# Patient Record
Sex: MALE | Race: WHITE | NOT HISPANIC OR LATINO
[De-identification: names, ages, dates, MRNs, and addresses within clinical notes are randomized per-mention and may not be internally consistent; named-entity substitution may affect disease eponyms.]

---

## 2017-01-04 ENCOUNTER — APPOINTMENT (OUTPATIENT)
Dept: CARDIOLOGY | Facility: CLINIC | Age: 82
End: 2017-01-04

## 2017-01-04 ENCOUNTER — NON-APPOINTMENT (OUTPATIENT)
Age: 82
End: 2017-01-04

## 2017-01-04 VITALS
OXYGEN SATURATION: 96 % | BODY MASS INDEX: 26.93 KG/M2 | WEIGHT: 152 LBS | SYSTOLIC BLOOD PRESSURE: 130 MMHG | HEIGHT: 63 IN | DIASTOLIC BLOOD PRESSURE: 80 MMHG | HEART RATE: 76 BPM

## 2017-01-05 ENCOUNTER — APPOINTMENT (OUTPATIENT)
Dept: HEMATOLOGY ONCOLOGY | Facility: CLINIC | Age: 82
End: 2017-01-05

## 2017-01-05 ENCOUNTER — LABORATORY RESULT (OUTPATIENT)
Age: 82
End: 2017-01-05

## 2017-01-05 VITALS
DIASTOLIC BLOOD PRESSURE: 75 MMHG | SYSTOLIC BLOOD PRESSURE: 165 MMHG | HEIGHT: 63 IN | TEMPERATURE: 98.2 F | WEIGHT: 158 LBS | HEART RATE: 87 BPM | BODY MASS INDEX: 28 KG/M2

## 2017-01-05 LAB
HCT VFR BLD CALC: 42.7 %
HGB BLD-MCNC: 12.8 G/DL
MCHC RBC-ENTMCNC: 30 GM/DL
MCHC RBC-ENTMCNC: 33.4 PG
MCV RBC AUTO: 111 FL
PLATELET # BLD AUTO: 486 K/UL
RBC # BLD: 3.84 M/UL
RBC # FLD: 18.8 %
WBC # FLD AUTO: 8.5 K/UL

## 2017-01-13 LAB
ALBUMIN SERPL ELPH-MCNC: 3.9 G/DL
ALP BLD-CCNC: 66 U/L
ALT SERPL-CCNC: 9 U/L
ANION GAP SERPL CALC-SCNC: 15 MMOL/L
AST SERPL-CCNC: 14 U/L
BILIRUB SERPL-MCNC: <0.2 MG/DL
BUN SERPL-MCNC: 49 MG/DL
CALCIUM SERPL-MCNC: 9 MG/DL
CHLORIDE SERPL-SCNC: 105 MMOL/L
CO2 SERPL-SCNC: 21 MMOL/L
CREAT SERPL-MCNC: 1.47 MG/DL
GLUCOSE SERPL-MCNC: 92 MG/DL
POTASSIUM SERPL-SCNC: 4.9 MMOL/L
PROT SERPL-MCNC: 6.5 G/DL
SODIUM SERPL-SCNC: 141 MMOL/L

## 2017-02-06 ENCOUNTER — LABORATORY RESULT (OUTPATIENT)
Age: 82
End: 2017-02-06

## 2017-02-06 ENCOUNTER — APPOINTMENT (OUTPATIENT)
Dept: HEMATOLOGY ONCOLOGY | Facility: CLINIC | Age: 82
End: 2017-02-06

## 2017-02-06 VITALS
HEART RATE: 86 BPM | DIASTOLIC BLOOD PRESSURE: 76 MMHG | SYSTOLIC BLOOD PRESSURE: 164 MMHG | TEMPERATURE: 97.6 F | WEIGHT: 158 LBS | BODY MASS INDEX: 28 KG/M2 | HEIGHT: 63 IN

## 2017-02-06 LAB
HCT VFR BLD CALC: 42.5 %
HGB BLD-MCNC: 13.8 G/DL
MCHC RBC-ENTMCNC: 32.4 GM/DL
MCHC RBC-ENTMCNC: 32.6 PG
MCV RBC AUTO: 101 FL
PLATELET # BLD AUTO: 635 K/UL
RBC # BLD: 4.22 M/UL
RBC # FLD: 18.2 %
WBC # FLD AUTO: 12.2 K/UL

## 2017-02-15 ENCOUNTER — APPOINTMENT (OUTPATIENT)
Dept: CARDIOLOGY | Facility: CLINIC | Age: 82
End: 2017-02-15

## 2017-02-15 ENCOUNTER — NON-APPOINTMENT (OUTPATIENT)
Age: 82
End: 2017-02-15

## 2017-02-15 VITALS
HEART RATE: 72 BPM | WEIGHT: 154 LBS | BODY MASS INDEX: 27.29 KG/M2 | DIASTOLIC BLOOD PRESSURE: 60 MMHG | SYSTOLIC BLOOD PRESSURE: 100 MMHG | OXYGEN SATURATION: 96 % | HEIGHT: 63 IN

## 2017-02-16 LAB
ALBUMIN SERPL ELPH-MCNC: 4.1 G/DL
ALP BLD-CCNC: 70 U/L
ALT SERPL-CCNC: 7 U/L
ANION GAP SERPL CALC-SCNC: 15 MMOL/L
AST SERPL-CCNC: 15 U/L
BASOPHILS # BLD AUTO: 0.06 K/UL
BASOPHILS NFR BLD AUTO: 0.7 %
BILIRUB SERPL-MCNC: 0.3 MG/DL
BUN SERPL-MCNC: 31 MG/DL
CALCIUM SERPL-MCNC: 9.6 MG/DL
CHLORIDE SERPL-SCNC: 106 MMOL/L
CO2 SERPL-SCNC: 21 MMOL/L
CREAT SERPL-MCNC: 1.54 MG/DL
EOSINOPHIL # BLD AUTO: 0.15 K/UL
EOSINOPHIL NFR BLD AUTO: 1.9 %
ERYTHROCYTE [SEDIMENTATION RATE] IN BLOOD BY WESTERGREN METHOD: 17 MM/HR
HCT VFR BLD CALC: 45.3 %
HGB BLD-MCNC: 14 G/DL
IMM GRANULOCYTES NFR BLD AUTO: 0.2 %
LYMPHOCYTES # BLD AUTO: 0.62 K/UL
LYMPHOCYTES NFR BLD AUTO: 7.7 %
MAN DIFF?: NORMAL
MCHC RBC-ENTMCNC: 30.9 GM/DL
MCHC RBC-ENTMCNC: 31.7 PG
MCV RBC AUTO: 102.7 FL
MONOCYTES # BLD AUTO: 0.15 K/UL
MONOCYTES NFR BLD AUTO: 1.9 %
NEUTROPHILS # BLD AUTO: 7.1 K/UL
NEUTROPHILS NFR BLD AUTO: 87.6 %
PLATELET # BLD AUTO: 559 K/UL
POTASSIUM SERPL-SCNC: 5 MMOL/L
PROT SERPL-MCNC: 6.7 G/DL
RBC # BLD: 4.41 M/UL
RBC # FLD: 19 %
SODIUM SERPL-SCNC: 142 MMOL/L
WBC # FLD AUTO: 8.1 K/UL

## 2017-02-17 ENCOUNTER — APPOINTMENT (OUTPATIENT)
Dept: HEMATOLOGY ONCOLOGY | Facility: CLINIC | Age: 82
End: 2017-02-17

## 2017-03-20 ENCOUNTER — APPOINTMENT (OUTPATIENT)
Dept: HEMATOLOGY ONCOLOGY | Facility: CLINIC | Age: 82
End: 2017-03-20

## 2017-04-07 ENCOUNTER — LABORATORY RESULT (OUTPATIENT)
Age: 82
End: 2017-04-07

## 2017-04-07 ENCOUNTER — APPOINTMENT (OUTPATIENT)
Dept: HEMATOLOGY ONCOLOGY | Facility: CLINIC | Age: 82
End: 2017-04-07

## 2017-04-07 VITALS
DIASTOLIC BLOOD PRESSURE: 85 MMHG | WEIGHT: 161 LBS | HEART RATE: 80 BPM | BODY MASS INDEX: 28.53 KG/M2 | TEMPERATURE: 97.5 F | HEIGHT: 63 IN | SYSTOLIC BLOOD PRESSURE: 153 MMHG

## 2017-04-07 LAB
HCT VFR BLD CALC: 41.5 %
HGB BLD-MCNC: 12.8 G/DL
MCHC RBC-ENTMCNC: 29.2 PG
MCHC RBC-ENTMCNC: 30.8 GM/DL
MCV RBC AUTO: 94.7 FL
PLATELET # BLD AUTO: 573 K/UL
RBC # BLD: 4.38 M/UL
RBC # FLD: 19.7 %
WBC # FLD AUTO: 9.9 K/UL

## 2017-05-05 ENCOUNTER — APPOINTMENT (OUTPATIENT)
Dept: HEMATOLOGY ONCOLOGY | Facility: CLINIC | Age: 82
End: 2017-05-05

## 2017-05-10 ENCOUNTER — APPOINTMENT (OUTPATIENT)
Dept: CARDIOLOGY | Facility: CLINIC | Age: 82
End: 2017-05-10

## 2017-07-17 ENCOUNTER — APPOINTMENT (OUTPATIENT)
Dept: HEMATOLOGY ONCOLOGY | Facility: CLINIC | Age: 82
End: 2017-07-17

## 2017-07-17 ENCOUNTER — LABORATORY RESULT (OUTPATIENT)
Age: 82
End: 2017-07-17

## 2017-07-17 VITALS
DIASTOLIC BLOOD PRESSURE: 65 MMHG | WEIGHT: 149 LBS | BODY MASS INDEX: 26.4 KG/M2 | HEIGHT: 63 IN | TEMPERATURE: 98.2 F | HEART RATE: 62 BPM | SYSTOLIC BLOOD PRESSURE: 123 MMHG

## 2017-07-17 LAB
HCT VFR BLD CALC: 37.7 %
HGB BLD-MCNC: 12.1 G/DL
MCHC RBC-ENTMCNC: 24.7 PG
MCHC RBC-ENTMCNC: 32 GM/DL
MCV RBC AUTO: 77.3 FL
PLATELET # BLD AUTO: 913 K/UL
RBC # BLD: 4.87 M/UL
RBC # FLD: 20.5 %
WBC # FLD AUTO: 11.5 K/UL

## 2017-07-26 ENCOUNTER — APPOINTMENT (OUTPATIENT)
Dept: HEMATOLOGY ONCOLOGY | Facility: CLINIC | Age: 82
End: 2017-07-26

## 2017-07-26 ENCOUNTER — LABORATORY RESULT (OUTPATIENT)
Age: 82
End: 2017-07-26

## 2017-07-26 ENCOUNTER — NON-APPOINTMENT (OUTPATIENT)
Age: 82
End: 2017-07-26

## 2017-07-26 ENCOUNTER — APPOINTMENT (OUTPATIENT)
Dept: CARDIOLOGY | Facility: CLINIC | Age: 82
End: 2017-07-26

## 2017-07-26 VITALS
SYSTOLIC BLOOD PRESSURE: 130 MMHG | OXYGEN SATURATION: 95 % | DIASTOLIC BLOOD PRESSURE: 74 MMHG | WEIGHT: 144 LBS | HEIGHT: 63 IN | BODY MASS INDEX: 25.52 KG/M2 | HEART RATE: 79 BPM

## 2017-07-26 VITALS
HEART RATE: 91 BPM | BODY MASS INDEX: 28.27 KG/M2 | DIASTOLIC BLOOD PRESSURE: 63 MMHG | SYSTOLIC BLOOD PRESSURE: 104 MMHG | HEIGHT: 60 IN | WEIGHT: 144 LBS | TEMPERATURE: 97.5 F

## 2017-07-26 DIAGNOSIS — G45.9 TRANSIENT CEREBRAL ISCHEMIC ATTACK, UNSPECIFIED: ICD-10-CM

## 2017-07-26 DIAGNOSIS — I10 ESSENTIAL (PRIMARY) HYPERTENSION: ICD-10-CM

## 2017-07-26 DIAGNOSIS — M54.9 DORSALGIA, UNSPECIFIED: ICD-10-CM

## 2017-07-26 DIAGNOSIS — R42 DIZZINESS AND GIDDINESS: ICD-10-CM

## 2017-07-26 LAB
HCT VFR BLD CALC: 41.7 %
HGB BLD-MCNC: 12.8 G/DL
MCHC RBC-ENTMCNC: 23.7 PG
MCHC RBC-ENTMCNC: 30.6 GM/DL
MCV RBC AUTO: 77.3 FL
PLATELET # BLD AUTO: 443 K/UL
RBC # BLD: 5.4 M/UL
RBC # FLD: 21 %
WBC # FLD AUTO: 4.1 K/UL

## 2017-07-27 LAB
25(OH)D3 SERPL-MCNC: 27.3 NG/ML
ALBUMIN SERPL ELPH-MCNC: 4.1 G/DL
ALP BLD-CCNC: 66 U/L
ALT SERPL-CCNC: 10 U/L
ANION GAP SERPL CALC-SCNC: 17 MMOL/L
AST SERPL-CCNC: 16 U/L
BASOPHILS # BLD AUTO: 0.11 K/UL
BASOPHILS NFR BLD AUTO: 2.6 %
BILIRUB SERPL-MCNC: 0.4 MG/DL
BUN SERPL-MCNC: 42 MG/DL
CALCIUM SERPL-MCNC: 9.8 MG/DL
CHLORIDE SERPL-SCNC: 98 MMOL/L
CHOLEST SERPL-MCNC: 238 MG/DL
CHOLEST/HDLC SERPL: 4.5 RATIO
CO2 SERPL-SCNC: 20 MMOL/L
CREAT SERPL-MCNC: 1.86 MG/DL
EOSINOPHIL # BLD AUTO: 0.04 K/UL
EOSINOPHIL NFR BLD AUTO: 0.9 %
GLUCOSE SERPL-MCNC: 95 MG/DL
HBA1C MFR BLD HPLC: 5.3 %
HCT VFR BLD CALC: 40.5 %
HDLC SERPL-MCNC: 53 MG/DL
HGB BLD-MCNC: 12.1 G/DL
LDLC SERPL CALC-MCNC: 168 MG/DL
LYMPHOCYTES # BLD AUTO: 0.75 K/UL
LYMPHOCYTES NFR BLD AUTO: 18.4 %
MAGNESIUM SERPL-MCNC: 2.1 MG/DL
MAN DIFF?: NORMAL
MCHC RBC-ENTMCNC: 23.1 PG
MCHC RBC-ENTMCNC: 29.9 GM/DL
MCV RBC AUTO: 77.4 FL
MONOCYTES # BLD AUTO: 0.04 K/UL
MONOCYTES NFR BLD AUTO: 0.9 %
NEUTROPHILS # BLD AUTO: 3.14 K/UL
NEUTROPHILS NFR BLD AUTO: 77.2 %
PLATELET # BLD AUTO: 530 K/UL
POTASSIUM SERPL-SCNC: 5.1 MMOL/L
PROT SERPL-MCNC: 6.8 G/DL
RBC # BLD: 5.23 M/UL
RBC # FLD: 21.9 %
SODIUM SERPL-SCNC: 135 MMOL/L
TRIGL SERPL-MCNC: 87 MG/DL
TSH SERPL-ACNC: 3.08 UIU/ML
WBC # FLD AUTO: 4.07 K/UL

## 2017-08-02 ENCOUNTER — APPOINTMENT (OUTPATIENT)
Dept: HEMATOLOGY ONCOLOGY | Facility: CLINIC | Age: 82
End: 2017-08-02
Payer: MEDICARE

## 2017-08-02 ENCOUNTER — LABORATORY RESULT (OUTPATIENT)
Age: 82
End: 2017-08-02

## 2017-08-02 VITALS — SYSTOLIC BLOOD PRESSURE: 105 MMHG | TEMPERATURE: 97.7 F | DIASTOLIC BLOOD PRESSURE: 70 MMHG | HEART RATE: 93 BPM

## 2017-08-02 LAB
HCT VFR BLD CALC: 39.9 %
HGB BLD-MCNC: 12.5 G/DL
MCHC RBC-ENTMCNC: 24.5 PG
MCHC RBC-ENTMCNC: 31.3 GM/DL
MCV RBC AUTO: 78.3 FL
PLATELET # BLD AUTO: 292 K/UL
RBC # BLD: 5.1 M/UL
RBC # FLD: 25.2 %
WBC # FLD AUTO: 9.5 K/UL

## 2017-08-02 PROCEDURE — 85025 COMPLETE CBC W/AUTO DIFF WBC: CPT

## 2017-08-02 PROCEDURE — 36415 COLL VENOUS BLD VENIPUNCTURE: CPT

## 2017-08-09 ENCOUNTER — LABORATORY RESULT (OUTPATIENT)
Age: 82
End: 2017-08-09

## 2017-08-09 ENCOUNTER — APPOINTMENT (OUTPATIENT)
Dept: HEMATOLOGY ONCOLOGY | Facility: CLINIC | Age: 82
End: 2017-08-09
Payer: MEDICARE

## 2017-08-09 VITALS
WEIGHT: 144 LBS | SYSTOLIC BLOOD PRESSURE: 136 MMHG | HEIGHT: 60 IN | HEART RATE: 77 BPM | BODY MASS INDEX: 28.27 KG/M2 | TEMPERATURE: 97.6 F | DIASTOLIC BLOOD PRESSURE: 61 MMHG

## 2017-08-09 LAB
HCT VFR BLD CALC: 41 %
HGB BLD-MCNC: 12.6 G/DL
MCHC RBC-ENTMCNC: 24.8 PG
MCHC RBC-ENTMCNC: 30.7 GM/DL
MCV RBC AUTO: 80.8 FL
PLATELET # BLD AUTO: 721 K/UL
RBC # BLD: 5.07 M/UL
RBC # FLD: 28 %
WBC # FLD AUTO: 8.9 K/UL

## 2017-08-09 PROCEDURE — 85025 COMPLETE CBC W/AUTO DIFF WBC: CPT

## 2017-08-09 PROCEDURE — 36415 COLL VENOUS BLD VENIPUNCTURE: CPT

## 2017-08-16 ENCOUNTER — LABORATORY RESULT (OUTPATIENT)
Age: 82
End: 2017-08-16

## 2017-08-16 ENCOUNTER — APPOINTMENT (OUTPATIENT)
Dept: HEMATOLOGY ONCOLOGY | Facility: CLINIC | Age: 82
End: 2017-08-16
Payer: MEDICARE

## 2017-08-16 VITALS
HEART RATE: 76 BPM | WEIGHT: 140 LBS | BODY MASS INDEX: 27.48 KG/M2 | HEIGHT: 60 IN | DIASTOLIC BLOOD PRESSURE: 76 MMHG | SYSTOLIC BLOOD PRESSURE: 135 MMHG | TEMPERATURE: 97.7 F

## 2017-08-16 LAB
HCT VFR BLD CALC: 39.1 %
HGB BLD-MCNC: 12.6 G/DL
MCHC RBC-ENTMCNC: 26.3 PG
MCHC RBC-ENTMCNC: 32.3 GM/DL
MCV RBC AUTO: 81.4 FL
PLATELET # BLD AUTO: 329 K/UL
RBC # BLD: 4.8 M/UL
RBC # FLD: 30.5 %
WBC # FLD AUTO: 4.7 K/UL

## 2017-08-16 PROCEDURE — 36415 COLL VENOUS BLD VENIPUNCTURE: CPT

## 2017-08-16 PROCEDURE — 85025 COMPLETE CBC W/AUTO DIFF WBC: CPT

## 2017-08-18 ENCOUNTER — APPOINTMENT (OUTPATIENT)
Dept: CARDIOLOGY | Facility: CLINIC | Age: 82
End: 2017-08-18
Payer: MEDICARE

## 2017-08-18 PROCEDURE — 93306 TTE W/DOPPLER COMPLETE: CPT

## 2017-08-24 ENCOUNTER — APPOINTMENT (OUTPATIENT)
Dept: CARDIOLOGY | Facility: CLINIC | Age: 82
End: 2017-08-24
Payer: MEDICARE

## 2017-08-24 PROCEDURE — 93880 EXTRACRANIAL BILAT STUDY: CPT

## 2017-08-25 ENCOUNTER — APPOINTMENT (OUTPATIENT)
Dept: HEMATOLOGY ONCOLOGY | Facility: CLINIC | Age: 82
End: 2017-08-25

## 2017-08-30 ENCOUNTER — LABORATORY RESULT (OUTPATIENT)
Age: 82
End: 2017-08-30

## 2017-08-30 ENCOUNTER — APPOINTMENT (OUTPATIENT)
Dept: HEMATOLOGY ONCOLOGY | Facility: CLINIC | Age: 82
End: 2017-08-30
Payer: MEDICARE

## 2017-08-30 VITALS — SYSTOLIC BLOOD PRESSURE: 145 MMHG | DIASTOLIC BLOOD PRESSURE: 85 MMHG | HEART RATE: 78 BPM | TEMPERATURE: 97.9 F

## 2017-08-30 PROCEDURE — 36415 COLL VENOUS BLD VENIPUNCTURE: CPT

## 2017-08-30 PROCEDURE — 85025 COMPLETE CBC W/AUTO DIFF WBC: CPT

## 2017-09-01 LAB
HCT VFR BLD CALC: 38.4 %
HGB BLD-MCNC: 12.2 G/DL
MCHC RBC-ENTMCNC: 27.6 PG
MCHC RBC-ENTMCNC: 31.8 GM/DL
MCV RBC AUTO: 86.7 FL
PLATELET # BLD AUTO: 452 K/UL
RBC # BLD: 4.44 M/UL
RBC # FLD: 32.5 %
WBC # FLD AUTO: 6.7 K/UL

## 2017-09-06 ENCOUNTER — APPOINTMENT (OUTPATIENT)
Dept: CARDIOLOGY | Facility: CLINIC | Age: 82
End: 2017-09-06
Payer: MEDICARE

## 2017-09-06 ENCOUNTER — MEDICATION RENEWAL (OUTPATIENT)
Age: 82
End: 2017-09-06

## 2017-09-06 VITALS
BODY MASS INDEX: 26.9 KG/M2 | DIASTOLIC BLOOD PRESSURE: 77 MMHG | HEIGHT: 60 IN | SYSTOLIC BLOOD PRESSURE: 142 MMHG | OXYGEN SATURATION: 95 % | WEIGHT: 137 LBS | HEART RATE: 96 BPM

## 2017-09-06 PROCEDURE — 90686 IIV4 VACC NO PRSV 0.5 ML IM: CPT

## 2017-09-06 PROCEDURE — G0008: CPT

## 2017-09-06 PROCEDURE — 99214 OFFICE O/P EST MOD 30 MIN: CPT | Mod: 25

## 2017-09-06 PROCEDURE — 93000 ELECTROCARDIOGRAM COMPLETE: CPT

## 2017-09-06 RX ORDER — HYDROXYUREA 500 MG/1
500 CAPSULE ORAL DAILY
Qty: 270 | Refills: 3 | Status: DISCONTINUED | COMMUNITY
Start: 2017-07-17 | End: 2017-09-06

## 2017-09-08 ENCOUNTER — NON-APPOINTMENT (OUTPATIENT)
Age: 82
End: 2017-09-08

## 2017-09-13 ENCOUNTER — APPOINTMENT (OUTPATIENT)
Dept: HEMATOLOGY ONCOLOGY | Facility: CLINIC | Age: 82
End: 2017-09-13

## 2017-11-08 ENCOUNTER — APPOINTMENT (OUTPATIENT)
Dept: NEUROLOGY | Facility: CLINIC | Age: 82
End: 2017-11-08
Payer: MEDICARE

## 2017-11-08 VITALS
SYSTOLIC BLOOD PRESSURE: 102 MMHG | BODY MASS INDEX: 22.33 KG/M2 | WEIGHT: 134 LBS | DIASTOLIC BLOOD PRESSURE: 64 MMHG | HEIGHT: 65 IN | HEART RATE: 86 BPM

## 2017-11-08 DIAGNOSIS — M48.061 SPINAL STENOSIS, LUMBAR REGION WITHOUT NEUROGENIC CLAUDICATION: ICD-10-CM

## 2017-11-08 DIAGNOSIS — R25.1 TREMOR, UNSPECIFIED: ICD-10-CM

## 2017-11-08 DIAGNOSIS — R26.81 UNSTEADINESS ON FEET: ICD-10-CM

## 2017-11-08 DIAGNOSIS — G25.2 OTHER SPECIFIED FORMS OF TREMOR: ICD-10-CM

## 2017-11-08 PROCEDURE — 99215 OFFICE O/P EST HI 40 MIN: CPT

## 2017-11-16 ENCOUNTER — RX RENEWAL (OUTPATIENT)
Age: 82
End: 2017-11-16

## 2017-11-22 ENCOUNTER — APPOINTMENT (OUTPATIENT)
Dept: HEMATOLOGY ONCOLOGY | Facility: CLINIC | Age: 82
End: 2017-11-22

## 2017-12-06 ENCOUNTER — APPOINTMENT (OUTPATIENT)
Dept: CARDIOLOGY | Facility: CLINIC | Age: 82
End: 2017-12-06

## 2017-12-19 ENCOUNTER — INPATIENT (INPATIENT)
Facility: HOSPITAL | Age: 82
LOS: 4 days | Discharge: HOME HEALTH | End: 2017-12-24
Attending: INTERNAL MEDICINE | Admitting: INTERNAL MEDICINE
Payer: MEDICARE

## 2017-12-19 VITALS
HEART RATE: 76 BPM | OXYGEN SATURATION: 97 % | SYSTOLIC BLOOD PRESSURE: 127 MMHG | WEIGHT: 139.99 LBS | RESPIRATION RATE: 16 BRPM | DIASTOLIC BLOOD PRESSURE: 76 MMHG

## 2017-12-19 DIAGNOSIS — Z98.890 OTHER SPECIFIED POSTPROCEDURAL STATES: Chronic | ICD-10-CM

## 2017-12-19 LAB
ABO RH CONFIRMATION: SIGNIFICANT CHANGE UP
ALBUMIN SERPL ELPH-MCNC: 3.2 G/DL — LOW (ref 3.3–5)
ALP SERPL-CCNC: 52 U/L — SIGNIFICANT CHANGE UP (ref 40–120)
ALT FLD-CCNC: 12 U/L — SIGNIFICANT CHANGE UP (ref 12–78)
ANION GAP SERPL CALC-SCNC: 9 MMOL/L — SIGNIFICANT CHANGE UP (ref 5–17)
ANISOCYTOSIS BLD QL: SLIGHT — SIGNIFICANT CHANGE UP
APPEARANCE UR: CLEAR — SIGNIFICANT CHANGE UP
APTT BLD: 32.8 SEC — SIGNIFICANT CHANGE UP (ref 27.5–37.4)
AST SERPL-CCNC: 16 U/L — SIGNIFICANT CHANGE UP (ref 15–37)
BILIRUB SERPL-MCNC: 0.2 MG/DL — SIGNIFICANT CHANGE UP (ref 0.2–1.2)
BILIRUB UR-MCNC: NEGATIVE — SIGNIFICANT CHANGE UP
BLD GP AB SCN SERPL QL: SIGNIFICANT CHANGE UP
BUN SERPL-MCNC: 56 MG/DL — HIGH (ref 7–23)
CALCIUM SERPL-MCNC: 8.6 MG/DL — SIGNIFICANT CHANGE UP (ref 8.5–10.1)
CHLORIDE SERPL-SCNC: 108 MMOL/L — SIGNIFICANT CHANGE UP (ref 96–108)
CO2 SERPL-SCNC: 19 MMOL/L — LOW (ref 22–31)
COLOR SPEC: YELLOW — SIGNIFICANT CHANGE UP
CREAT SERPL-MCNC: 2 MG/DL — HIGH (ref 0.5–1.3)
DIFF PNL FLD: NEGATIVE — SIGNIFICANT CHANGE UP
ELLIPTOCYTES BLD QL SMEAR: SLIGHT — SIGNIFICANT CHANGE UP
EOSINOPHIL NFR BLD AUTO: 1 % — SIGNIFICANT CHANGE UP (ref 0–6)
GIANT PLATELETS BLD QL SMEAR: PRESENT — SIGNIFICANT CHANGE UP
GLUCOSE SERPL-MCNC: 91 MG/DL — SIGNIFICANT CHANGE UP (ref 70–99)
GLUCOSE UR QL: NEGATIVE MG/DL — SIGNIFICANT CHANGE UP
HCT VFR BLD CALC: 21.8 % — LOW (ref 39–50)
HGB BLD-MCNC: 6.9 G/DL — CRITICAL LOW (ref 13–17)
HYPOCHROMIA BLD QL: SIGNIFICANT CHANGE UP
INR BLD: 1 RATIO — SIGNIFICANT CHANGE UP (ref 0.88–1.16)
KETONES UR-MCNC: NEGATIVE — SIGNIFICANT CHANGE UP
LEUKOCYTE ESTERASE UR-ACNC: NEGATIVE — SIGNIFICANT CHANGE UP
LYMPHOCYTES # BLD AUTO: 19 % — SIGNIFICANT CHANGE UP (ref 13–44)
MACROCYTES BLD QL: SLIGHT — SIGNIFICANT CHANGE UP
MANUAL DIF COMMENT BLD-IMP: SIGNIFICANT CHANGE UP
MCHC RBC-ENTMCNC: 30.4 PG — SIGNIFICANT CHANGE UP (ref 27–34)
MCHC RBC-ENTMCNC: 31.5 GM/DL — LOW (ref 32–36)
MCV RBC AUTO: 96.7 FL — SIGNIFICANT CHANGE UP (ref 80–100)
MICROCYTES BLD QL: SLIGHT — SIGNIFICANT CHANGE UP
MONOCYTES NFR BLD AUTO: 2 % — SIGNIFICANT CHANGE UP (ref 2–14)
NEUTROPHILS NFR BLD AUTO: 77 % — SIGNIFICANT CHANGE UP (ref 43–77)
NITRITE UR-MCNC: NEGATIVE — SIGNIFICANT CHANGE UP
OVALOCYTES BLD QL SMEAR: SIGNIFICANT CHANGE UP
PH UR: 5 — SIGNIFICANT CHANGE UP (ref 5–8)
PLAT MORPH BLD: NORMAL — SIGNIFICANT CHANGE UP
PLATELET # BLD AUTO: 214 K/UL — SIGNIFICANT CHANGE UP (ref 150–400)
POIKILOCYTOSIS BLD QL AUTO: SIGNIFICANT CHANGE UP
POTASSIUM SERPL-MCNC: 5.2 MMOL/L — SIGNIFICANT CHANGE UP (ref 3.5–5.3)
POTASSIUM SERPL-SCNC: 5.2 MMOL/L — SIGNIFICANT CHANGE UP (ref 3.5–5.3)
PROT SERPL-MCNC: 6.2 GM/DL — SIGNIFICANT CHANGE UP (ref 6–8.3)
PROT UR-MCNC: 15 MG/DL
PROTHROM AB SERPL-ACNC: 10.8 SEC — SIGNIFICANT CHANGE UP (ref 9.8–12.7)
RBC # BLD: 2.25 M/UL — LOW (ref 4.2–5.8)
RBC # FLD: 21.2 % — HIGH (ref 10.3–14.5)
RBC BLD AUTO: (no result)
RBC CASTS # UR COMP ASSIST: NEGATIVE /HPF — SIGNIFICANT CHANGE UP (ref 0–4)
SCHISTOCYTES BLD QL AUTO: SLIGHT — SIGNIFICANT CHANGE UP
SODIUM SERPL-SCNC: 136 MMOL/L — SIGNIFICANT CHANGE UP (ref 135–145)
SP GR SPEC: 1.02 — SIGNIFICANT CHANGE UP (ref 1.01–1.02)
TYPE + AB SCN PNL BLD: SIGNIFICANT CHANGE UP
UROBILINOGEN FLD QL: NEGATIVE MG/DL — SIGNIFICANT CHANGE UP
VARIANT LYMPHS # BLD: 1 % — SIGNIFICANT CHANGE UP (ref 0–6)
WBC # BLD: 2.5 K/UL — LOW (ref 3.8–10.5)
WBC # FLD AUTO: 2.5 K/UL — LOW (ref 3.8–10.5)
WBC UR QL: SIGNIFICANT CHANGE UP

## 2017-12-19 PROCEDURE — 99285 EMERGENCY DEPT VISIT HI MDM: CPT

## 2017-12-19 PROCEDURE — 71010: CPT | Mod: 26

## 2017-12-19 RX ORDER — PANTOPRAZOLE SODIUM 20 MG/1
8 TABLET, DELAYED RELEASE ORAL
Qty: 80 | Refills: 0 | Status: DISCONTINUED | OUTPATIENT
Start: 2017-12-19 | End: 2017-12-20

## 2017-12-19 RX ORDER — SODIUM CHLORIDE 9 MG/ML
1000 INJECTION INTRAMUSCULAR; INTRAVENOUS; SUBCUTANEOUS
Qty: 0 | Refills: 0 | Status: DISCONTINUED | OUTPATIENT
Start: 2017-12-19 | End: 2017-12-20

## 2017-12-19 RX ORDER — ACETAMINOPHEN 500 MG
650 TABLET ORAL EVERY 6 HOURS
Qty: 0 | Refills: 0 | Status: DISCONTINUED | OUTPATIENT
Start: 2017-12-19 | End: 2017-12-24

## 2017-12-19 RX ORDER — AMLODIPINE BESYLATE 2.5 MG/1
10 TABLET ORAL DAILY
Qty: 0 | Refills: 0 | Status: DISCONTINUED | OUTPATIENT
Start: 2017-12-19 | End: 2017-12-24

## 2017-12-19 RX ORDER — PANTOPRAZOLE SODIUM 20 MG/1
80 TABLET, DELAYED RELEASE ORAL ONCE
Qty: 0 | Refills: 0 | Status: COMPLETED | OUTPATIENT
Start: 2017-12-19 | End: 2017-12-19

## 2017-12-19 RX ADMIN — PANTOPRAZOLE SODIUM 80 MILLIGRAM(S): 20 TABLET, DELAYED RELEASE ORAL at 19:57

## 2017-12-19 RX ADMIN — PANTOPRAZOLE SODIUM 10 MG/HR: 20 TABLET, DELAYED RELEASE ORAL at 20:07
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
## 2017-12-19 NOTE — ED ADULT NURSE REASSESSMENT NOTE - NS ED NURSE REASSESS COMMENT FT1
report received from MAHNAZ Barrera. pt resting comfortably. protonix IVP and IV infusion initiated. type & screen drawn by phlebotomy, awaiting results prior to PRBC infusions. safety maintained. will continue to monitor.

## 2017-12-19 NOTE — H&P ADULT - ASSESSMENT
89 yo M with PMH of polycythemia (on hydroxyurea), HTN, h/o back surgery, p/w LE tremors    *Anemia 2/2 GI bleed  -NPO  -Protonix drip  -IVF  -GI consult  -Avoid NSAIDs / ASA for now   -Trend H/H  -PRBC transfusion ordered in ED     *Leukopenia w/ h/o polycythemia   -Patient states he has a history of polycythemia and is on hydroxyurea  -Heme / onc consult   -Hydroxyurea can cause myelosuppression, will continue until heme evaluation tomorrow     *Difficulty ambulating - LE tremors  -Neuro consult  -PT eval  -Vit B12 / TSH     *H/o HTN  -C/w amlodipine    *DVT ppx  -SCDs 89 yo M with PMH of polycythemia (on hydroxyurea), HTN, h/o back surgery, p/w LE tremors    *Anemia 2/2 GI bleed  -NPO  -Protonix drip  -IVF  -GI consult  -Avoid NSAIDs / ASA for now   -Trend H/H  -PRBC transfusion ordered in ED     *Leukopenia w/ h/o polycythemia   -Patient states he has a history of polycythemia and is on hydroxyurea  -Heme / onc consult   -Hydroxyurea can cause myelosuppression, will continue until heme evaluation tomorrow     *JOSE vs CKD  -Possibly 2/2 GI bleed  -Trend creatnine in AM after IVF for improvement   -UA negative     *Difficulty ambulating - LE tremors  -Neuro consult  -PT eval  -Vit B12 / TSH     *H/o HTN  -C/w amlodipine    *DVT ppx  -SCDs

## 2017-12-19 NOTE — H&P ADULT - HISTORY OF PRESENT ILLNESS
89 yo M with PMH of polycythemia (on hydroxyurea), HTN, h/o back surgery, p/w LE tremors. Patient states he has had the tremors for years, but recently it has gotten worse and making it difficult to ambulate. Patient has been falling frequently due to this, denies hitting head or any trauma.     In ED, patient found to have Hb of 6.9 and PRBC transfusion was initiated. Patient denies hematochezia / menela / hematemesis / abdominal pain / nausea /vomiting / CP / SOB     PSH: back surgery  Social Hx: Denies x 3, lives at home alone  Family Hx: mother - HTN

## 2017-12-19 NOTE — ED ADULT TRIAGE NOTE - CHIEF COMPLAINT QUOTE
pt presents to ED from home due to complaints of weakness and tremors x 3 months worsening pt lives alone A&O x 3

## 2017-12-20 DIAGNOSIS — R94.31 ABNORMAL ELECTROCARDIOGRAM [ECG] [EKG]: ICD-10-CM

## 2017-12-20 DIAGNOSIS — I10 ESSENTIAL (PRIMARY) HYPERTENSION: ICD-10-CM

## 2017-12-20 LAB
ALBUMIN SERPL ELPH-MCNC: 2.8 G/DL — LOW (ref 3.3–5)
ALP SERPL-CCNC: 48 U/L — SIGNIFICANT CHANGE UP (ref 40–120)
ALT FLD-CCNC: 11 U/L — LOW (ref 12–78)
ANION GAP SERPL CALC-SCNC: 7 MMOL/L — SIGNIFICANT CHANGE UP (ref 5–17)
ANISOCYTOSIS BLD QL: SLIGHT — SIGNIFICANT CHANGE UP
AST SERPL-CCNC: 13 U/L — LOW (ref 15–37)
BASOPHILS # BLD AUTO: 0 K/UL — SIGNIFICANT CHANGE UP (ref 0–0.2)
BASOPHILS NFR BLD AUTO: 1.9 % — SIGNIFICANT CHANGE UP (ref 0–2)
BILIRUB SERPL-MCNC: 1 MG/DL — SIGNIFICANT CHANGE UP (ref 0.2–1.2)
BUN SERPL-MCNC: 49 MG/DL — HIGH (ref 7–23)
BURR CELLS BLD QL SMEAR: PRESENT — SIGNIFICANT CHANGE UP
CALCIUM SERPL-MCNC: 8 MG/DL — LOW (ref 8.5–10.1)
CHLORIDE SERPL-SCNC: 110 MMOL/L — HIGH (ref 96–108)
CO2 SERPL-SCNC: 20 MMOL/L — LOW (ref 22–31)
CREAT SERPL-MCNC: 1.63 MG/DL — HIGH (ref 0.5–1.3)
DACRYOCYTES BLD QL SMEAR: SLIGHT — SIGNIFICANT CHANGE UP
ELLIPTOCYTES BLD QL SMEAR: SLIGHT — SIGNIFICANT CHANGE UP
EOSINOPHIL # BLD AUTO: 0.1 K/UL — SIGNIFICANT CHANGE UP (ref 0–0.5)
EOSINOPHIL NFR BLD AUTO: 2.6 % — SIGNIFICANT CHANGE UP (ref 0–6)
GLUCOSE SERPL-MCNC: 79 MG/DL — SIGNIFICANT CHANGE UP (ref 70–99)
HCT VFR BLD CALC: 25.8 % — LOW (ref 39–50)
HGB BLD-MCNC: 8.3 G/DL — LOW (ref 13–17)
HYPOCHROMIA BLD QL: SLIGHT — SIGNIFICANT CHANGE UP
INR BLD: 1.04 RATIO — SIGNIFICANT CHANGE UP (ref 0.88–1.16)
LG PLATELETS BLD QL AUTO: SLIGHT — SIGNIFICANT CHANGE UP
LYMPHOCYTES # BLD AUTO: 0.4 K/UL — LOW (ref 1–3.3)
LYMPHOCYTES # BLD AUTO: 16.5 % — SIGNIFICANT CHANGE UP (ref 13–44)
MAGNESIUM SERPL-MCNC: 2.1 MG/DL — SIGNIFICANT CHANGE UP (ref 1.6–2.6)
MANUAL DIF COMMENT BLD-IMP: SIGNIFICANT CHANGE UP
MCHC RBC-ENTMCNC: 31.4 PG — SIGNIFICANT CHANGE UP (ref 27–34)
MCHC RBC-ENTMCNC: 32.3 GM/DL — SIGNIFICANT CHANGE UP (ref 32–36)
MCV RBC AUTO: 97.1 FL — SIGNIFICANT CHANGE UP (ref 80–100)
MICROCYTES BLD QL: SLIGHT — SIGNIFICANT CHANGE UP
MONOCYTES # BLD AUTO: 0.1 K/UL — SIGNIFICANT CHANGE UP (ref 0–0.9)
MONOCYTES NFR BLD AUTO: 2.2 % — SIGNIFICANT CHANGE UP (ref 2–14)
NEUTROPHILS # BLD AUTO: 1.9 K/UL — SIGNIFICANT CHANGE UP (ref 1.8–7.4)
NEUTROPHILS NFR BLD AUTO: 76.8 % — SIGNIFICANT CHANGE UP (ref 43–77)
NEUTS HYPERSEG # BLD: PRESENT — SIGNIFICANT CHANGE UP
OVALOCYTES BLD QL SMEAR: SLIGHT — SIGNIFICANT CHANGE UP
PHOSPHATE SERPL-MCNC: 3.4 MG/DL — SIGNIFICANT CHANGE UP (ref 2.5–4.5)
PLAT MORPH BLD: NORMAL — SIGNIFICANT CHANGE UP
PLATELET # BLD AUTO: 166 K/UL — SIGNIFICANT CHANGE UP (ref 150–400)
POIKILOCYTOSIS BLD QL AUTO: SLIGHT — SIGNIFICANT CHANGE UP
POTASSIUM SERPL-MCNC: 4.7 MMOL/L — SIGNIFICANT CHANGE UP (ref 3.5–5.3)
POTASSIUM SERPL-SCNC: 4.7 MMOL/L — SIGNIFICANT CHANGE UP (ref 3.5–5.3)
PROT SERPL-MCNC: 5.6 GM/DL — LOW (ref 6–8.3)
PROTHROM AB SERPL-ACNC: 11.2 SEC — SIGNIFICANT CHANGE UP (ref 9.8–12.7)
RBC # BLD: 2.66 M/UL — LOW (ref 4.2–5.8)
RBC # FLD: 18.7 % — HIGH (ref 10.3–14.5)
RBC BLD AUTO: (no result)
SCHISTOCYTES BLD QL AUTO: SLIGHT — SIGNIFICANT CHANGE UP
SODIUM SERPL-SCNC: 137 MMOL/L — SIGNIFICANT CHANGE UP (ref 135–145)
SPHEROCYTES BLD QL SMEAR: SLIGHT — SIGNIFICANT CHANGE UP
TROPONIN I SERPL-MCNC: <0.015 NG/ML — SIGNIFICANT CHANGE UP (ref 0.01–0.04)
TSH SERPL-MCNC: 4.19 UU/ML — HIGH (ref 0.36–3.74)
VIT B12 SERPL-MCNC: 481 PG/ML — SIGNIFICANT CHANGE UP (ref 232–1245)
WBC # BLD: 2.4 K/UL — LOW (ref 3.8–10.5)
WBC # FLD AUTO: 2.4 K/UL — LOW (ref 3.8–10.5)

## 2017-12-20 PROCEDURE — 99222 1ST HOSP IP/OBS MODERATE 55: CPT

## 2017-12-20 PROCEDURE — 93010 ELECTROCARDIOGRAM REPORT: CPT | Mod: 76

## 2017-12-20 PROCEDURE — 74176 CT ABD & PELVIS W/O CONTRAST: CPT | Mod: 26

## 2017-12-20 PROCEDURE — 99232 SBSQ HOSP IP/OBS MODERATE 35: CPT

## 2017-12-20 RX ORDER — PANTOPRAZOLE SODIUM 20 MG/1
40 TABLET, DELAYED RELEASE ORAL
Qty: 0 | Refills: 0 | Status: DISCONTINUED | OUTPATIENT
Start: 2017-12-20 | End: 2017-12-24

## 2017-12-20 RX ORDER — TUBERCULIN PURIFIED PROTEIN DERIVATIVE 5 [IU]/.1ML
5 INJECTION, SOLUTION INTRADERMAL ONCE
Qty: 0 | Refills: 0 | Status: DISCONTINUED | OUTPATIENT
Start: 2017-12-20 | End: 2017-12-20

## 2017-12-20 RX ADMIN — PANTOPRAZOLE SODIUM 40 MILLIGRAM(S): 20 TABLET, DELAYED RELEASE ORAL at 17:36

## 2017-12-20 RX ADMIN — AMLODIPINE BESYLATE 10 MILLIGRAM(S): 2.5 TABLET ORAL at 06:41

## 2017-12-20 RX ADMIN — Medication 650 MILLIGRAM(S): at 01:05

## 2017-12-20 RX ADMIN — PANTOPRAZOLE SODIUM 10 MG/HR: 20 TABLET, DELAYED RELEASE ORAL at 04:29

## 2017-12-20 NOTE — PHYSICAL THERAPY INITIAL EVALUATION ADULT - GENERAL OBSERVATIONS, REHAB EVAL
pt with sinus bradycardia, HR=54 ,evaluated by Cardiology (Dr Bustos),EKG near baseline ,no cardiac symptoms pt appears younger than stated age 89yrs; he is mentally sharp; pt with sinus bradycardia, HR=54 ,evaluated by Cardiology (Dr Bustos),EKG near baseline ,no cardiac symptoms
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
## 2017-12-20 NOTE — PHYSICAL THERAPY INITIAL EVALUATION ADULT - PATIENT PROFILE REVIEW, REHAB EVAL
yes yes/H/H=8.3/25.8 after 2u PRBCs transfused,up from 6.9/ yesterday H/H=8.3/25.8 after 2u PRBCs transfused,up from 6.9/ yesterday; Finding of RLL 1.9cm mass suspicious for neoplasm on imaging/yes

## 2017-12-20 NOTE — CONSULT NOTE ADULT - SUBJECTIVE AND OBJECTIVE BOX
HPI:  89 yo M with PMH of polycythemia (on hydroxyurea), HTN, h/o back surgery, p/w LE tremors. Patient states he has had the tremors for years, but recently it has gotten worse and making it difficult to ambulate. Patient has been falling frequently due to this, denies hitting head or any trauma.     In ED, patient found to have Hb of 6.9 and PRBC transfusion was initiated. Patient denies hematochezia / menela / hematemesis / abdominal pain / nausea /vomiting / CP / SOB   No bleeding noted on 5S. was guaiac + in ER.    PSH: back surgery  Social Hx: Denies x 3, lives at home alone  Family Hx: mother - HTN (19 Dec 2017 21:19)      PAST MEDICAL & SURGICAL HISTORY:  Tremor  HTN (hypertension)  History of back surgery      MEDICATIONS  (STANDING):  amLODIPine   Tablet 10 milliGRAM(s) Oral daily  pantoprazole    Tablet 40 milliGRAM(s) Oral two times a day before meals  sodium chloride 0.9%. 1000 milliLiter(s) (41.67 mL/Hr) IV Continuous <Continuous>    MEDICATIONS  (PRN):  acetaminophen   Tablet 650 milliGRAM(s) Oral every 6 hours PRN For Temp greater than 38 C (100.4 F)      Allergies    No Known Allergies    Intolerances    ROS  As above  Otherwise unremarkable    Vital Signs Last 24 Hrs  T(C): 36.2 (20 Dec 2017 05:46), Max: 36.4 (19 Dec 2017 23:23)  T(F): 97.2 (20 Dec 2017 05:46), Max: 97.6 (19 Dec 2017 23:23)  HR: 60 (20 Dec 2017 05:46) (51 - 76)  BP: 142/62 (20 Dec 2017 05:46) (93/64 - 142/62)  BP(mean): --  RR: 20 (20 Dec 2017 05:46) (16 - 20)  SpO2: 98% (20 Dec 2017 05:46) (97% - 100%)    Constitutional: NAD, well-developed  Respiratory: CTAB  Cardiovascular: S1 and S2, RRR  Gastrointestinal: BS+, soft, NT/ND  Extremities: No peripheral edema  Psychiatric: Normal mood, normal affect  Skin: No rashes  Rectal: Dark brown stool, guaiac +    LABS:                        6.9    2.5   )-----------( 214      ( 19 Dec 2017 17:15 )             21.8     12-19    136  |  108  |  56<H>  ----------------------------<  91  5.2   |  19<L>  |  2.00<H>    Ca    8.6      19 Dec 2017 17:15    TPro  6.2  /  Alb  3.2<L>  /  TBili  0.2  /  DBili  x   /  AST  16  /  ALT  12  /  AlkPhos  52  12-19    PT/INR - ( 19 Dec 2017 17:15 )   PT: 10.8 sec;   INR: 1.00 ratio         PTT - ( 19 Dec 2017 17:15 )  PTT:32.8 sec  LIVER FUNCTIONS - ( 19 Dec 2017 17:15 )  Alb: 3.2 g/dL / Pro: 6.2 gm/dL / ALK PHOS: 52 U/L / ALT: 12 U/L / AST: 16 U/L / GGT: x             RADIOLOGY & ADDITIONAL STUDIES:

## 2017-12-20 NOTE — CONSULT NOTE ADULT - SUBJECTIVE AND OBJECTIVE BOX
Well known to me 89 y/o male with P vera on Hydrea admitted  with weakness/ increased tremors, found to have Hgb 6.9  and hem + stool . Transfused. ASA d/c.    Patient was diagnosed with P vera > 25 yrs ago- initial management at [facility]. Most recently - he was on stable dose of Hydrea 1000 mg daily M-F and 1500 mg on Sat and on Sunday- counts were well controlled for over 6 months. His last CBC done in our office in August : WBC was 6.7  Hgb 12.2 and plts 452. he says the he could not  come back to our office for follow up since August due to transportation problems.    He has chronic back pain and tremors  due to spine disease - had spine surgeries several yrs ago..    PAST MEDICAL & SURGICAL HISTORY:  Tremor  HTN (hypertension)  History of back surgery  P vera    Social history : , no children, lives alone Fully independent in [place]. No tobacco, no ETOH.    ROS: unsteady gait tremors due to chronic back issues, back/ leg pains    Vital Signs Last 24 Hrs  T(C): 36.3 (20 Dec 2017 11:30), Max: 36.4 (19 Dec 2017 23:23)  T(F): 97.3 (20 Dec 2017 11:30), Max: 97.6 (19 Dec 2017 23:23)  HR: 56 (20 Dec 2017 11:30) (51 - 70)  BP: 119/67 (20 Dec 2017 11:30) (110/47 - 142/62)  BP(mean): --  RR: 17 (20 Dec 2017 11:30) (17 - 20)  SpO2: 100% (20 Dec 2017 11:30) (98% - 100%)    Looks well. Chest clear. Heart regular. Abdomen soft, no splenomegaly. No leg edema. Skin- few ecchymoses.   Neuro- mentally sharp, no focal  overt deficits.                          8.3    2.4   )-----------( 166      ( 20 Dec 2017 07:09 )             25.8     MEDICATIONS  (STANDING):  amLODIPine   Tablet 10 milliGRAM(s) Oral daily  pantoprazole    Tablet 40 milliGRAM(s) Oral two times a day before meals
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
## 2017-12-20 NOTE — PHYSICAL THERAPY INITIAL EVALUATION ADULT - ACTIVE RANGE OF MOTION EXAMINATION, REHAB EVAL
bilateral upper extremity Active ROM was WFL (within functional limits)/arthritic changes DIP joints both hands

## 2017-12-20 NOTE — PHYSICAL THERAPY INITIAL EVALUATION ADULT - DIAGNOSIS, PT EVAL
GIB ,anemia of acute blood loss ,tremors, generalized weakness, falls ,back pain GIB ,anemia of acute blood loss ,tremors, generalized weakness, falls ,back pain, EKG changes, r/o ischemia GIB ,anemia of acute blood loss,polycythemia vera on hydroxyurea, ,tremors, generalized weakness, falls ,back pain, EKG changes, r/o ischemia; RLL mass 1.9 cm (concerning for neoplasm)

## 2017-12-20 NOTE — PHYSICAL THERAPY INITIAL EVALUATION ADULT - DISCHARGE DISPOSITION, PT EVAL
d/w patient who objects initially ,worried about getting his mail which is delivered to office at [facility]; and worried relatives won't know where he is ; explained mail can be held at post-office and he can telephone relatived to inform them of DC plans/rehabilitation facility
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
## 2017-12-20 NOTE — PHYSICAL THERAPY INITIAL EVALUATION ADULT - PERTINENT HX OF CURRENT PROBLEM, REHAB EVAL
low hemoglobin=6.9,increasing tremors making ambulation increasingly difficult,falls,acute on chronic back pain low hemoglobin=6.9,increasing tremors making ambulation increasingly difficult, falls, acute on chronic back pain

## 2017-12-20 NOTE — PHYSICAL THERAPY INITIAL EVALUATION ADULT - OCCUPATION
retired  at [email or web address] in [place] [facility] x 19 yrs, and prior to that worked at [employer] in [place] until they closed
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
## 2017-12-20 NOTE — PHYSICAL THERAPY INITIAL EVALUATION ADULT - CRITERIA FOR SKILLED THERAPEUTIC INTERVENTIONS
predicted duration of therapy intervention/anticipated equipment needs at discharge/anticipated discharge recommendation/impairments found/functional limitations in following categories/risk reduction/prevention/rehab potential/therapy frequency

## 2017-12-20 NOTE — PATIENT PROFILE ADULT. - TEACHING/LEARNING LEARNING PREFERENCES
skill demonstration/verbal instruction/audio/video/written material/group instruction/individual instruction/pictorial

## 2017-12-20 NOTE — PROGRESS NOTE ADULT - ASSESSMENT
89 yo M with PMH of polycythemia (on hydroxyurea), HTN, h/o back surgery, p/w LE tremors and acute on chronic low back pain found to have acute anemia:     *Anemia 2/2 GI bleed - likely acute on chronic blood loss anemia  - suspect UGIB, + hemoccult, given advanced age will hold on EGD/colonoscopy. Appreciate GI recs.   - s/p  2 U RBC with appropriate rise in H&H.   - s/p PPI drip --> start PO PPI BID.   - likely Aspirin induced (was on 2 aspirins daily) -> STOP Aspirin.   - pending CT Abd/pelvis for overt GI pathology, r/o mass.   - start Clear diet.   - check iron studies.   -Avoid NSAIDs / ASA for now     # Abnormal EKG - no CP, check troponin, may have element of demand ischemia from GIB.   - consult Cardiology for further recs.     *Leukopenia w/ h/o polycythemia   -Patient states he has a history of polycythemia and is on hydroxyurea  -Heme / onc consult   -Hydroxyurea can cause myelosuppression, holding for now.     *JOSE vs CKD - improvement in Scr 2 --> 1.6.  -Possibly 2/2 GI bleed  -UA negative     *Difficulty ambulating - LE tremors, Acute on Chronic Back Pain  - dc Neuro consult, suspect frequent falls from weakness / anemia and chronic back pain.   - pending CT r/o occult fracture.   -PT eval  -Check Vit B12/ folate.   - mildly elevated TSH, check free T3.     *H/o HTN - normotensive.  -C/w amlodipine but may need lower dose.     *DVT ppx -SCDs     Code Status: lengthy discussion with patient who states he never thought about end of life planning before. Lives alone and inquires about Home Care vs Assisted Living.   - Unsure about code status, for now Full Code.     Total time > 55 mins.

## 2017-12-21 LAB
ANION GAP SERPL CALC-SCNC: 7 MMOL/L — SIGNIFICANT CHANGE UP (ref 5–17)
BUN SERPL-MCNC: 44 MG/DL — HIGH (ref 7–23)
CALCIUM SERPL-MCNC: 8.4 MG/DL — LOW (ref 8.5–10.1)
CHLORIDE SERPL-SCNC: 109 MMOL/L — HIGH (ref 96–108)
CO2 SERPL-SCNC: 23 MMOL/L — SIGNIFICANT CHANGE UP (ref 22–31)
CREAT SERPL-MCNC: 1.58 MG/DL — HIGH (ref 0.5–1.3)
FERRITIN SERPL-MCNC: 130 NG/ML — SIGNIFICANT CHANGE UP (ref 30–400)
FOLATE SERPL-MCNC: 2.8 NG/ML — LOW (ref 4.8–24.2)
GLUCOSE SERPL-MCNC: 81 MG/DL — SIGNIFICANT CHANGE UP (ref 70–99)
HCT VFR BLD CALC: 26.4 % — LOW (ref 39–50)
HGB BLD-MCNC: 8.2 G/DL — LOW (ref 13–17)
IRON SATN MFR SERPL: 117 UG/DL — SIGNIFICANT CHANGE UP (ref 45–165)
IRON SATN MFR SERPL: 41 % — SIGNIFICANT CHANGE UP (ref 16–55)
MCHC RBC-ENTMCNC: 29.7 PG — SIGNIFICANT CHANGE UP (ref 27–34)
MCHC RBC-ENTMCNC: 31.1 GM/DL — LOW (ref 32–36)
MCV RBC AUTO: 95.4 FL — SIGNIFICANT CHANGE UP (ref 80–100)
PLATELET # BLD AUTO: 145 K/UL — LOW (ref 150–400)
POTASSIUM SERPL-MCNC: 5.2 MMOL/L — SIGNIFICANT CHANGE UP (ref 3.5–5.3)
POTASSIUM SERPL-SCNC: 5.2 MMOL/L — SIGNIFICANT CHANGE UP (ref 3.5–5.3)
RBC # BLD: 2.77 M/UL — LOW (ref 4.2–5.8)
RBC # FLD: 18.8 % — HIGH (ref 10.3–14.5)
SODIUM SERPL-SCNC: 139 MMOL/L — SIGNIFICANT CHANGE UP (ref 135–145)
TIBC SERPL-MCNC: 287 UG/DL — SIGNIFICANT CHANGE UP (ref 220–430)
UIBC SERPL-MCNC: 170 UG/DL — SIGNIFICANT CHANGE UP (ref 110–370)
WBC # BLD: 3.3 K/UL — LOW (ref 3.8–10.5)
WBC # FLD AUTO: 3.3 K/UL — LOW (ref 3.8–10.5)

## 2017-12-21 PROCEDURE — 93010 ELECTROCARDIOGRAM REPORT: CPT

## 2017-12-21 PROCEDURE — 71250 CT THORAX DX C-: CPT | Mod: 26

## 2017-12-21 RX ADMIN — PANTOPRAZOLE SODIUM 40 MILLIGRAM(S): 20 TABLET, DELAYED RELEASE ORAL at 18:10

## 2017-12-21 RX ADMIN — PANTOPRAZOLE SODIUM 40 MILLIGRAM(S): 20 TABLET, DELAYED RELEASE ORAL at 05:15

## 2017-12-21 RX ADMIN — AMLODIPINE BESYLATE 10 MILLIGRAM(S): 2.5 TABLET ORAL at 05:15

## 2017-12-21 NOTE — PROGRESS NOTE ADULT - SUBJECTIVE AND OBJECTIVE BOX
Patient is a 89y old  Male who presents with a chief complaint of LE tremors (19 Dec 2017 21:19)      Subective:  Feels good. No overt bleeding noted. AM CBC still pending.    PAST MEDICAL & SURGICAL HISTORY:  Tremor  HTN (hypertension)  History of back surgery      MEDICATIONS  (STANDING):  amLODIPine   Tablet 10 milliGRAM(s) Oral daily  pantoprazole    Tablet 40 milliGRAM(s) Oral two times a day before meals    MEDICATIONS  (PRN):  acetaminophen   Tablet 650 milliGRAM(s) Oral every 6 hours PRN For Temp greater than 38 C (100.4 F)      REVIEW OF SYSTEMS:    RESPIRATORY: No shortness of breath  CARDIOVASCULAR: No chest pain  All other review of systems is negative unless indicated above.    Vital Signs Last 24 Hrs  T(C): 36.5 (20 Dec 2017 21:00), Max: 36.5 (20 Dec 2017 21:00)  T(F): 97.7 (20 Dec 2017 21:00), Max: 97.7 (20 Dec 2017 21:00)  HR: 77 (20 Dec 2017 21:00) (56 - 77)  BP: 127/59 (20 Dec 2017 21:00) (119/67 - 127/59)  BP(mean): --  RR: 16 (20 Dec 2017 21:00) (16 - 17)  SpO2: 100% (20 Dec 2017 21:00) (100% - 100%)    PHYSICAL EXAM:    Constitutional: NAD, well-developed  Respiratory: CTAB  Cardiovascular: S1 and S2, RRR  Gastrointestinal: BS+, soft, NT/ND  Extremities: No peripheral edema  Psychiatric: Normal mood, normal affect    LABS:                        8.3    2.4   )-----------( 166      ( 20 Dec 2017 07:09 )             25.8     12-21    139  |  109<H>  |  44<H>  ----------------------------<  81  5.2   |  23  |  1.58<H>    Ca    8.4<L>      21 Dec 2017 07:08  Phos  3.4     12-20  Mg     2.1     12-20    TPro  5.6<L>  /  Alb  2.8<L>  /  TBili  1.0  /  DBili  x   /  AST  13<L>  /  ALT  11<L>  /  AlkPhos  48  12-20    PT/INR - ( 20 Dec 2017 07:09 )   PT: 11.2 sec;   INR: 1.04 ratio         PTT - ( 19 Dec 2017 17:15 )  PTT:32.8 sec  LIVER FUNCTIONS - ( 20 Dec 2017 07:09 )  Alb: 2.8 g/dL / Pro: 5.6 gm/dL / ALK PHOS: 48 U/L / ALT: 11 U/L / AST: 13 U/L / GGT: x             RADIOLOGY & ADDITIONAL STUDIES:

## 2017-12-21 NOTE — PROGRESS NOTE ADULT - ASSESSMENT
89 yo M with PMH of polycythemia (on hydroxyurea), HTN, h/o back surgery, p/w LE tremors and acute on chronic low back pain found to have acute anemia:     *Anemia 2/2 GI bleed - likely acute on chronic blood loss anemia  - suspect UGIB, + hemoccult, given advanced age will hold on EGD/colonoscopy. Appreciate GI recs.   - s/p  2 U RBC with appropriate rise in H&H.   - continue Protonix po   - likely Aspirin induced (was on 2 aspirins daily) -> STOP Aspirin.   - CT abdomen and pelvis showed 1.9 right lower lobe opacity concerning for neoplasm.- will obtain a chest CT non contrast  - check iron studies- results pending  - Avoid NSAIDs / ASA     # Abnormal EKG - no CP, check troponin, may have element of demand ischemia from GIB.   - cardiology consult appreciated.      *Leukopenia w/ h/o polycythemia   -Patient states he has a history of polycythemia and is on hydroxyurea  -Heme / onc consult   -Hydroxyurea can cause myelosuppression, holding for now.     *JOSE vs CKD - improvement in Scr 2 --> 1.6.  -Possibly 2/2 GI bleed  -UA negative     *Difficulty ambulating - LE tremors, Acute on Chronic Back Pain  - dc Neuro consult, suspect frequent falls from weakness / anemia and chronic back pain.   - pending CT r/o occult fracture.   -PT eval  -Check Vit B12/ folate.   - mildly elevated TSH, check free T3.     *H/o HTN - normotensive.  -C/w amlodipine but may need lower dose.     *DVT ppx -SCDs     Code Status: lengthy discussion with patient who states he never thought about end of life planning before. Lives alone and inquires about Home Care vs Assisted Living.   - Unsure about code status, for now Full Code.     Total time > 55 mins.

## 2017-12-21 NOTE — PROVIDER CONTACT NOTE (OTHER) - NAME OF MD/NP/PA/DO NOTIFIED:
Consult for Dr. Lau notified; spoke with Anita.
Consult for Dr. Lindsey notified; spoke with Hollie.
Recalled in oncology consult for Dr. Lau; spoke with Aleida.
DR. ALCANTARA
Dr. Rodrigues
Dr. Willis

## 2017-12-21 NOTE — PROGRESS NOTE ADULT - ASSESSMENT
Imp:  Anemia and guaiac +  CT with lung nodule, which patient wasn't previously aware of.    Rec:  Cont protonix  Cont to defer EGD if possible  ****Will need outpatient workup for lung nodule - d/w pt

## 2017-12-21 NOTE — PROGRESS NOTE ADULT - SUBJECTIVE AND OBJECTIVE BOX
CC: tremors, acute on chronic back pain    HPI: This is a 89 yo M with PMH of polycythemia (on hydroxyurea), HTN, h/o back surgery, p/w LE tremors. Patient states he has had the tremors for years, but recently it has gotten worse and making it difficult to ambulate. Patient has been falling frequently due to this, denies hitting head or any trauma.     In ED, patient found to have Hb of 6.9 and PRBC transfusion was initiated. Patient denies hematochezia / menela / hematemesis / abdominal pain / nausea /vomiting / CP / SOB     12/21- seen and examined. Participated with PT. Upset that he will need short term rehab on discharge and would like to be discharged today.     ROS: neg unless stated above.       Vital Signs Last 24 Hrs  T(C): 36.6 (21 Dec 2017 11:01), Max: 36.6 (21 Dec 2017 11:01)  T(F): 97.8 (21 Dec 2017 11:01), Max: 97.8 (21 Dec 2017 11:01)  HR: 70 (21 Dec 2017 11:01) (70 - 77)  BP: 112/58 (21 Dec 2017 11:01) (112/58 - 127/59)  BP(mean): --  RR: 16 (21 Dec 2017 11:01) (16 - 16)  SpO2: 98% (21 Dec 2017 11:01) (98% - 100%)    PHYSICAL EXAM:  Constitutional: NAD, awake and alert, well-developed elderly male.   HEENT: PERR, EOMI, Normal Hearing, MMM  Neck: Soft and supple, No LAD, No JVD  Respiratory: Breath sounds are clear bilaterally, No wheezing, rales or rhonchi  Cardiovascular: S1 and S2, regular rate and rhythm, no Murmurs, gallops or rubs  Gastrointestinal: Bowel Sounds present, soft, nontender, nondistended, no guarding, no rebound  Extremities: No peripheral edema  Vascular: 2+ peripheral pulses  Neurological: A/O x 3, no focal deficits  Musculoskeletal: 5/5 strength b/l upper and lower extremities  Skin: No rashes    MEDICATIONS  (STANDING):  amLODIPine   Tablet 10 milliGRAM(s) Oral daily  pantoprazole    Tablet 40 milliGRAM(s) Oral two times a day before meals    MEDICATIONS  (PRN):  acetaminophen   Tablet 650 milliGRAM(s) Oral every 6 hours PRN For Temp greater than 38 C (100.4 F)        LABS: All Labs Reviewed:                          8.2    3.3   )-----------( 145      ( 21 Dec 2017 07:08 )             26.4   12-21    139  |  109<H>  |  44<H>  ----------------------------<  81  5.2   |  23  |  1.58<H>    Ca    8.4<L>      21 Dec 2017 07:08  Phos  3.4     12-20  Mg     2.1     12-20    TPro  5.6<L>  /  Alb  2.8<L>  /  TBili  1.0  /  DBili  x   /  AST  13<L>  /  ALT  11<L>  /  AlkPhos  48  12-20                          8.3    2.4   )-----------( 166      ( 20 Dec 2017 07:09 )             25.8     12-20    137  |  110<H>  |  49<H>  ----------------------------<  79  4.7   |  20<L>  |  1.63<H>    Ca    8.0<L>      20 Dec 2017 07:09  Phos  3.4     12-20  Mg     2.1     12-20    TPro  5.6<L>  /  Alb  2.8<L>  /  TBili  1.0  /  DBili  x   /  AST  13<L>  /  ALT  11<L>  /  AlkPhos  48  12-20  PT/INR - ( 20 Dec 2017 07:09 )   PT: 11.2 sec;   INR: 1.04 ratio    PTT - ( 19 Dec 2017 17:15 )  PTT:32.8 sec  CARDIAC MARKERS ( 20 Dec 2017 08:12 )  <0.015 ng/mL / x     / x     / x     / x          Pending CT Abd/pelvis.   EKG: incomplete RBBB, no acute signs of STEMI    < from: CT Abdomen and Pelvis w/ Oral Cont (12.20.17 @ 11:02) >  IMPRESSION: 1.9 cm right lower lobe opacity raising suspicion for   neoplasm. Atelectasis or scar is also inthe differential diagnosis.  Recommend comparison with prior chest CT scan, if available.   Additionally, follow-up CT in 4 weeks is recommended to reassess.    No evidence of malignancy in the abdomen/pelvis.    < end of copied text >

## 2017-12-22 LAB
ANION GAP SERPL CALC-SCNC: 8 MMOL/L — SIGNIFICANT CHANGE UP (ref 5–17)
BUN SERPL-MCNC: 47 MG/DL — HIGH (ref 7–23)
CALCIUM SERPL-MCNC: 8.1 MG/DL — LOW (ref 8.5–10.1)
CHLORIDE SERPL-SCNC: 111 MMOL/L — HIGH (ref 96–108)
CO2 SERPL-SCNC: 21 MMOL/L — LOW (ref 22–31)
CREAT SERPL-MCNC: 1.63 MG/DL — HIGH (ref 0.5–1.3)
GLUCOSE SERPL-MCNC: 83 MG/DL — SIGNIFICANT CHANGE UP (ref 70–99)
HCT VFR BLD CALC: 24.9 % — LOW (ref 39–50)
HGB BLD-MCNC: 7.9 G/DL — LOW (ref 13–17)
MCHC RBC-ENTMCNC: 31.2 PG — SIGNIFICANT CHANGE UP (ref 27–34)
MCHC RBC-ENTMCNC: 31.7 GM/DL — LOW (ref 32–36)
MCV RBC AUTO: 98.3 FL — SIGNIFICANT CHANGE UP (ref 80–100)
PLATELET # BLD AUTO: 125 K/UL — LOW (ref 150–400)
POTASSIUM SERPL-MCNC: 4.7 MMOL/L — SIGNIFICANT CHANGE UP (ref 3.5–5.3)
POTASSIUM SERPL-SCNC: 4.7 MMOL/L — SIGNIFICANT CHANGE UP (ref 3.5–5.3)
RBC # BLD: 2.53 M/UL — LOW (ref 4.2–5.8)
RBC # FLD: 21.2 % — HIGH (ref 10.3–14.5)
SODIUM SERPL-SCNC: 140 MMOL/L — SIGNIFICANT CHANGE UP (ref 135–145)
WBC # BLD: 3.2 K/UL — LOW (ref 3.8–10.5)
WBC # FLD AUTO: 3.2 K/UL — LOW (ref 3.8–10.5)

## 2017-12-22 PROCEDURE — 99232 SBSQ HOSP IP/OBS MODERATE 35: CPT

## 2017-12-22 RX ORDER — FOLIC ACID 0.8 MG
1 TABLET ORAL DAILY
Qty: 0 | Refills: 0 | Status: DISCONTINUED | OUTPATIENT
Start: 2017-12-22 | End: 2017-12-24

## 2017-12-22 RX ORDER — FUROSEMIDE 40 MG
20 TABLET ORAL ONCE
Qty: 0 | Refills: 0 | Status: COMPLETED | OUTPATIENT
Start: 2017-12-22 | End: 2017-12-22

## 2017-12-22 RX ADMIN — Medication 20 MILLIGRAM(S): at 16:20

## 2017-12-22 RX ADMIN — AMLODIPINE BESYLATE 10 MILLIGRAM(S): 2.5 TABLET ORAL at 05:22

## 2017-12-22 RX ADMIN — Medication 1 MILLIGRAM(S): at 17:11

## 2017-12-22 RX ADMIN — PANTOPRAZOLE SODIUM 40 MILLIGRAM(S): 20 TABLET, DELAYED RELEASE ORAL at 05:22

## 2017-12-22 RX ADMIN — PANTOPRAZOLE SODIUM 40 MILLIGRAM(S): 20 TABLET, DELAYED RELEASE ORAL at 16:27

## 2017-12-22 NOTE — PROGRESS NOTE ADULT - ASSESSMENT
89 y/o male with > 25 yrs history of P vera on Hydrea now with anemia/ GI bleeding due to ASA. Transfused. ASA d/c. On PPI.  Mild leukopenia and thrombocytopenia- can be due to Hydrea ( his counts were stable for many months but he was not able to come back for follow up since August).    Continue to hold Hydrea.    Will monitor with outpatient weekly CBC and resume lower dose Hydrea when counts start raising. If persistent pancytopenia- possibly developing myelofibrotic/ spent phase- supportive care will be indicated .  Agree with PRBC transfusions- would maintain Hgb > 8-9.   I will be away next week. Dr Romero will be covering if needed.   Our office is arranging for weekly CBC- home draw by [facility].   He lives alone with no family support. Social service - re home support services , help with transportation ,  ? assisted living    Incidental finding of indeterminate RLL opacity. I would not pursue w/up at this point due to his age and comorbid conditions.

## 2017-12-22 NOTE — PROGRESS NOTE ADULT - SUBJECTIVE AND OBJECTIVE BOX
89 y/o male with P vera on Hydrea admitted with anemia/ GI bleeding likely due to ASA. No active bleeding. Transfused.  Has nathan mild leukopenia and mild thrombocytopenia. Hydrea held.    Feels OK. Receiving PRBC transfusion. XRay/ followed by CT scans- showed nonspecific opacity RLL.     Vital Signs Last 24 Hrs  T(C): 37.7 (22 Dec 2017 16:42), Max: 37.8 (22 Dec 2017 16:24)  T(F): 99.8 (22 Dec 2017 16:42), Max: 100 (22 Dec 2017 16:24)  HR: 67 (22 Dec 2017 16:42) (58 - 77)  BP: 121/55 (22 Dec 2017 16:42) (103/42 - 132/78)  BP(mean): --  RR: 181 (22 Dec 2017 16:42) (16 - 181)  SpO2: 100% (22 Dec 2017 16:42) (98% - 100%)    Abdomen soft, no splenomegaly.                          7.9    3.2   )-----------( 125      ( 22 Dec 2017 07:27 )             24.9       MEDICATIONS  (STANDING):  amLODIPine   Tablet 10 milliGRAM(s) Oral daily  folic acid 1 milliGRAM(s) Oral daily  pantoprazole    Tablet 40 milliGRAM(s) Oral two times a day before meals

## 2017-12-22 NOTE — PROGRESS NOTE ADULT - ASSESSMENT
89 yo M with PMH of polycythemia (on hydroxyurea), HTN, h/o back surgery, p/w LE tremors and acute on chronic low back pain found to have acute anemia:     *Anemia 2/2 GI bleed - likely acute on chronic blood loss anemia  - suspect UGIB, + hemoccult, given advanced age will hold on EGD/colonoscopy. Appreciate GI recs.   - s/p  2 U RBC with appropriate rise in H&H.   - 12/22- H/H dropped this morning. As per Dr Marie's conversation with Dr Lau, patient h/h around 12- so will transfuse with 2U PRBC today and will recheck H/H in AM.  - continue Protonix po   - likely Aspirin induced (was on 2 aspirins daily) -> STOP Aspirin.   - CT abdomen and pelvis showed 1.9 right lower lobe opacity concerning for neoplasm.- will obtain a chest CT non contrast  - iron studies results noted.  - Avoid NSAIDs / ASA     # Abnormal EKG - no CP, check troponin, may have element of demand ischemia from GIB.   - cardiology consult appreciated.      *Leukopenia w/ h/o polycythemia   -Patient states he has a history of polycythemia and is on hydroxyurea  -Heme / onc consult   -Hydroxyurea can cause myelosuppression, holding for now.     *JOSE vs CKD - improvement in Scr 2 --> 1.6.  -Possibly 2/2 GI bleed  -UA negative     *Difficulty ambulating - LE tremors, Acute on Chronic Back Pain  - dc Neuro consult, suspect frequent falls from weakness / anemia and chronic back pain.   - PT eval  - Check Vit B12/ folate.   - mildly elevated TSH, check free T3.     *H/o HTN - normotensive.  -C/w amlodipine but may need lower dose.     *DVT ppx -SCDs     Code Status: lengthy discussion with patient who states he never thought about end of life planning before. Lives alone and inquires about Home Care vs Assisted Living.   - Unsure about code status, for now Full Code.     Case d/w team on IDR. Continue present treatment plan- transfuse and recheck H/H in AM.      Total time > 55 mins. 89 yo M with PMH of polycythemia (on hydroxyurea), HTN, h/o back surgery, p/w LE tremors and acute on chronic low back pain found to have acute anemia:     *Anemia 2/2 GI bleed - likely acute on chronic blood loss anemia  - suspect UGIB, + hemoccult, given advanced age will hold on EGD/colonoscopy. Appreciate GI recs.   - s/p  2 U RBC with appropriate rise in H&H.   - 12/22- H/H dropped this morning. As per  conversation with Dr Lau, patient h/h around 12.5 to 14- so will transfuse with 2U PRBC today and will recheck H/H in AM.  - continue Protonix po   - likely Aspirin induced (was on 2 aspirins daily) -> STOP Aspirin.   - CT abdomen and pelvis showed 1.9 right lower lobe opacity concerning for neoplasm.- will obtain a chest CT non contrast  - iron studies results noted.  - Avoid NSAIDs / ASA   - Low folate; replace    # Abnormal EKG - no CP, check troponin, may have element of demand ischemia from GIB.   - cardiology consult appreciated.      *Leukopenia w/ h/o polycythemia   -Patient states he has a history of polycythemia and is on hydroxyurea  -Heme / onc consult   -Hydroxyurea can cause myelosuppression, holding for now.     *JOSE vs CKD - improvement in Scr 2 --> 1.6.  -Possibly 2/2 GI bleed  -UA negative     *Difficulty ambulating - LE tremors, Acute on Chronic Back Pain  - dc Neuro consult, suspect frequent falls from weakness / anemia and chronic back pain.   - PT eval  - Check Vit B12/ folate.   - mildly elevated TSH, check free T3.     *H/o HTN - normotensive.  -C/w amlodipine but may need lower dose.     *DVT ppx -SCDs     Code Status: lengthy discussion with patient who states he never thought about end of life planning before. Lives alone and inquires about Home Care vs Assisted Living.   - Unsure about code status, for now Full Code.     Case d/w team on IDR. Continue present treatment plan- transfuse and recheck H/H in AM.

## 2017-12-22 NOTE — PROGRESS NOTE ADULT - SUBJECTIVE AND OBJECTIVE BOX
CC: tremors, acute on chronic back pain    HPI: This is a 89 yo M with PMH of polycythemia (on hydroxyurea), HTN, h/o back surgery, p/w LE tremors. Patient states he has had the tremors for years, but recently it has gotten worse and making it difficult to ambulate. Patient has been falling frequently due to this, denies hitting head or any trauma.     In ED, patient found to have Hb of 6.9 and PRBC transfusion was initiated. Patient denies hematochezia / menela / hematemesis / abdominal pain / nausea /vomiting / CP / SOB     12/22- patient was seen and examined. Frustrated that he is not being released from the hospital as pf yet.     ROS: neg unless stated above.       Vital Signs Last 24 Hrs  T(C): 36.6 (22 Dec 2017 12:37), Max: 36.8 (22 Dec 2017 05:39)  T(F): 97.9 (22 Dec 2017 12:37), Max: 98.2 (22 Dec 2017 05:39)  HR: 76 (22 Dec 2017 12:37) (58 - 76)  BP: 117/50 (22 Dec 2017 12:37) (108/51 - 132/78)  BP(mean): --  RR: 18 (22 Dec 2017 12:37) (16 - 18)  SpO2: 100% (22 Dec 2017 12:37) (98% - 100%)    PHYSICAL EXAM:  Constitutional: NAD, awake and alert, well-developed elderly male.   HEENT: PERR, EOMI, Normal Hearing, MMM  Neck: Soft and supple, No LAD, No JVD  Respiratory: Breath sounds are clear bilaterally, No wheezing, rales or rhonchi  Cardiovascular: S1 and S2, regular rate and rhythm, no Murmurs, gallops or rubs  Gastrointestinal: Bowel Sounds present, soft, nontender, nondistended, no guarding, no rebound  Extremities: No peripheral edema  Vascular: 2+ peripheral pulses  Neurological: A/O x 3, no focal deficits  Musculoskeletal: 5/5 strength b/l upper and lower extremities  Skin: No rashes    MEDICATIONS  (STANDING):  amLODIPine   Tablet 10 milliGRAM(s) Oral daily  pantoprazole    Tablet 40 milliGRAM(s) Oral two times a day before meals    MEDICATIONS  (PRN):  acetaminophen   Tablet 650 milliGRAM(s) Oral every 6 hours PRN For Temp greater than 38 C (100.4 F)        LABS: All Labs Reviewed:                                     7.9    3.2   )-----------( 125      ( 22 Dec 2017 07:27 )             24.9                8.2    3.3   )-----------( 145      ( 21 Dec 2017 07:08 )             26.4   12-21    139  |  109<H>  |  44<H>  ----------------------------<  81  5.2   |  23  |  1.58<H>    Ca    8.4<L>      21 Dec 2017 07:08  Phos  3.4     12-20  Mg     2.1     12-20    TPro  5.6<L>  /  Alb  2.8<L>  /  TBili  1.0  /  DBili  x   /  AST  13<L>  /  ALT  11<L>  /  AlkPhos  48  12-20                          8.3    2.4   )-----------( 166      ( 20 Dec 2017 07:09 )             25.8     12-20    137  |  110<H>  |  49<H>  ----------------------------<  79  4.7   |  20<L>  |  1.63<H>    Ca    8.0<L>      20 Dec 2017 07:09  Phos  3.4     12-20  Mg     2.1     12-20    TPro  5.6<L>  /  Alb  2.8<L>  /  TBili  1.0  /  DBili  x   /  AST  13<L>  /  ALT  11<L>  /  AlkPhos  48  12-20  PT/INR - ( 20 Dec 2017 07:09 )   PT: 11.2 sec;   INR: 1.04 ratio    PTT - ( 19 Dec 2017 17:15 )  PTT:32.8 sec  CARDIAC MARKERS ( 20 Dec 2017 08:12 )  <0.015 ng/mL / x     / x     / x     / x          Pending CT Abd/pelvis.   EKG: incomplete RBBB, no acute signs of STEMI    < from: CT Abdomen and Pelvis w/ Oral Cont (12.20.17 @ 11:02) >  IMPRESSION: 1.9 cm right lower lobe opacity raising suspicion for   neoplasm. Atelectasis or scar is also inthe differential diagnosis.  Recommend comparison with prior chest CT scan, if available.   Additionally, follow-up CT in 4 weeks is recommended to reassess.    No evidence of malignancy in the abdomen/pelvis.    < end of copied text > CC: tremors, acute on chronic back pain    HPI: This is a 89 yo M with PMH of polycythemia (on hydroxyurea), HTN, h/o back surgery, p/w LE tremors. Patient states he has had the tremors for years, but recently it has gotten worse and making it difficult to ambulate. Patient has been falling frequently due to this, denies hitting head or any trauma.     In ED, patient found to have Hb of 6.9 and PRBC transfusion was initiated. Patient denies hematochezia / menela / hematemesis / abdominal pain / nausea /vomiting / CP / SOB     12/22- patient was seen and examined.   Hb dropped to 7.9  No apparent bleed    ROS: neg unless stated above.       Vital Signs Last 24 Hrs  T(C): 36.6 (22 Dec 2017 12:37), Max: 36.8 (22 Dec 2017 05:39)  T(F): 97.9 (22 Dec 2017 12:37), Max: 98.2 (22 Dec 2017 05:39)  HR: 76 (22 Dec 2017 12:37) (58 - 76)  BP: 117/50 (22 Dec 2017 12:37) (108/51 - 132/78)  BP(mean): --  RR: 18 (22 Dec 2017 12:37) (16 - 18)  SpO2: 100% (22 Dec 2017 12:37) (98% - 100%)    PHYSICAL EXAM:  Constitutional: NAD, awake and alert, well-developed elderly male.   HEENT: PERR, EOMI, Normal Hearing, MMM  Neck: Soft and supple, No LAD, No JVD  Respiratory: Breath sounds are clear bilaterally, No wheezing, rales or rhonchi  Cardiovascular: S1 and S2, regular rate and rhythm, no Murmurs, gallops or rubs  Gastrointestinal: Bowel Sounds present, soft, nontender, nondistended, no guarding, no rebound  Extremities: No peripheral edema  Vascular: 2+ peripheral pulses  Neurological: A/O x 3, no focal deficits  Musculoskeletal: 5/5 strength b/l upper and lower extremities  Skin: No rashes    MEDICATIONS  (STANDING):  amLODIPine   Tablet 10 milliGRAM(s) Oral daily  pantoprazole    Tablet 40 milliGRAM(s) Oral two times a day before meals    MEDICATIONS  (PRN):  acetaminophen   Tablet 650 milliGRAM(s) Oral every 6 hours PRN For Temp greater than 38 C (100.4 F)        LABS: All Labs Reviewed:                                     7.9    3.2   )-----------( 125      ( 22 Dec 2017 07:27 )             24.9                8.2    3.3   )-----------( 145      ( 21 Dec 2017 07:08 )             26.4   12-21    139  |  109<H>  |  44<H>  ----------------------------<  81  5.2   |  23  |  1.58<H>    Ca    8.4<L>      21 Dec 2017 07:08  Phos  3.4     12-20  Mg     2.1     12-20    TPro  5.6<L>  /  Alb  2.8<L>  /  TBili  1.0  /  DBili  x   /  AST  13<L>  /  ALT  11<L>  /  AlkPhos  48  12-20                          8.3    2.4   )-----------( 166      ( 20 Dec 2017 07:09 )             25.8     12-20    137  |  110<H>  |  49<H>  ----------------------------<  79  4.7   |  20<L>  |  1.63<H>    Ca    8.0<L>      20 Dec 2017 07:09  Phos  3.4     12-20  Mg     2.1     12-20    TPro  5.6<L>  /  Alb  2.8<L>  /  TBili  1.0  /  DBili  x   /  AST  13<L>  /  ALT  11<L>  /  AlkPhos  48  12-20  PT/INR - ( 20 Dec 2017 07:09 )   PT: 11.2 sec;   INR: 1.04 ratio    PTT - ( 19 Dec 2017 17:15 )  PTT:32.8 sec  CARDIAC MARKERS ( 20 Dec 2017 08:12 )  <0.015 ng/mL / x     / x     / x     / x          Pending CT Abd/pelvis.   EKG: incomplete RBBB, no acute signs of STEMI    < from: CT Abdomen and Pelvis w/ Oral Cont (12.20.17 @ 11:02) >  IMPRESSION: 1.9 cm right lower lobe opacity raising suspicion for   neoplasm. Atelectasis or scar is also inthe differential diagnosis.  Recommend comparison with prior chest CT scan, if available.   Additionally, follow-up CT in 4 weeks is recommended to reassess.    No evidence of malignancy in the abdomen/pelvis.    < end of copied text >

## 2017-12-22 NOTE — PROGRESS NOTE ADULT - SUBJECTIVE AND OBJECTIVE BOX
Patient is a 89y old  Male who presents with a chief complaint of LE tremors (19 Dec 2017 21:19)      Subective:  No complaints. Eager to leave. D/W nurse -> no bleeding    PAST MEDICAL & SURGICAL HISTORY:  Tremor  HTN (hypertension)  History of back surgery      MEDICATIONS  (STANDING):  amLODIPine   Tablet 10 milliGRAM(s) Oral daily  furosemide   Injectable 20 milliGRAM(s) IV Push once  pantoprazole    Tablet 40 milliGRAM(s) Oral two times a day before meals    MEDICATIONS  (PRN):  acetaminophen   Tablet 650 milliGRAM(s) Oral every 6 hours PRN For Temp greater than 38 C (100.4 F)      REVIEW OF SYSTEMS:    RESPIRATORY: No shortness of breath  CARDIOVASCULAR: No chest pain  All other review of systems is negative unless indicated above.    Vital Signs Last 24 Hrs  T(C): 36.6 (22 Dec 2017 12:37), Max: 36.8 (22 Dec 2017 05:39)  T(F): 97.9 (22 Dec 2017 12:37), Max: 98.2 (22 Dec 2017 05:39)  HR: 76 (22 Dec 2017 12:37) (58 - 76)  BP: 117/50 (22 Dec 2017 12:37) (108/51 - 132/78)  BP(mean): --  RR: 18 (22 Dec 2017 12:37) (16 - 18)  SpO2: 100% (22 Dec 2017 12:37) (98% - 100%)    PHYSICAL EXAM:    Constitutional: NAD, well-developed  Respiratory: CTAB  Cardiovascular: S1 and S2, RRR  Gastrointestinal: BS+, soft, NT/ND  Extremities: No peripheral edema  Psychiatric: Normal mood, normal affect    LABS:                        7.9    3.2   )-----------( 125      ( 22 Dec 2017 07:27 )             24.9     12-22    140  |  111<H>  |  47<H>  ----------------------------<  83  4.7   |  21<L>  |  1.63<H>    Ca    8.1<L>      22 Dec 2017 07:27

## 2017-12-22 NOTE — PROGRESS NOTE ADULT - ASSESSMENT
Imp:  anemia and guaiac +    Rec:  Cont PPI  Defer endoscopic eval given age and stable H/H  Reconsult prn

## 2017-12-23 LAB
ANION GAP SERPL CALC-SCNC: 9 MMOL/L — SIGNIFICANT CHANGE UP (ref 5–17)
APPEARANCE UR: CLEAR — SIGNIFICANT CHANGE UP
BACTERIA # UR AUTO: (no result)
BILIRUB UR-MCNC: NEGATIVE — SIGNIFICANT CHANGE UP
BUN SERPL-MCNC: 56 MG/DL — HIGH (ref 7–23)
CALCIUM SERPL-MCNC: 8.4 MG/DL — LOW (ref 8.5–10.1)
CHLORIDE SERPL-SCNC: 107 MMOL/L — SIGNIFICANT CHANGE UP (ref 96–108)
CO2 SERPL-SCNC: 23 MMOL/L — SIGNIFICANT CHANGE UP (ref 22–31)
COLOR SPEC: YELLOW — SIGNIFICANT CHANGE UP
CREAT SERPL-MCNC: 2.15 MG/DL — HIGH (ref 0.5–1.3)
DIFF PNL FLD: (no result)
EPI CELLS # UR: SIGNIFICANT CHANGE UP
GLUCOSE SERPL-MCNC: 85 MG/DL — SIGNIFICANT CHANGE UP (ref 70–99)
GLUCOSE UR QL: NEGATIVE MG/DL — SIGNIFICANT CHANGE UP
HCT VFR BLD CALC: 29.6 % — LOW (ref 39–50)
HGB BLD-MCNC: 9.2 G/DL — LOW (ref 13–17)
KETONES UR-MCNC: NEGATIVE — SIGNIFICANT CHANGE UP
LEUKOCYTE ESTERASE UR-ACNC: NEGATIVE — SIGNIFICANT CHANGE UP
MCHC RBC-ENTMCNC: 29.9 PG — SIGNIFICANT CHANGE UP (ref 27–34)
MCHC RBC-ENTMCNC: 31.2 GM/DL — LOW (ref 32–36)
MCV RBC AUTO: 95.8 FL — SIGNIFICANT CHANGE UP (ref 80–100)
NITRITE UR-MCNC: NEGATIVE — SIGNIFICANT CHANGE UP
PH UR: 5 — SIGNIFICANT CHANGE UP (ref 5–8)
PLATELET # BLD AUTO: 120 K/UL — LOW (ref 150–400)
POTASSIUM SERPL-MCNC: 4.4 MMOL/L — SIGNIFICANT CHANGE UP (ref 3.5–5.3)
POTASSIUM SERPL-SCNC: 4.4 MMOL/L — SIGNIFICANT CHANGE UP (ref 3.5–5.3)
PROT UR-MCNC: NEGATIVE MG/DL — SIGNIFICANT CHANGE UP
RBC # BLD: 3.09 M/UL — LOW (ref 4.2–5.8)
RBC # FLD: 19.3 % — HIGH (ref 10.3–14.5)
RBC CASTS # UR COMP ASSIST: (no result) /HPF (ref 0–4)
SODIUM SERPL-SCNC: 139 MMOL/L — SIGNIFICANT CHANGE UP (ref 135–145)
SP GR SPEC: 1.01 — SIGNIFICANT CHANGE UP (ref 1.01–1.02)
UROBILINOGEN FLD QL: NEGATIVE MG/DL — SIGNIFICANT CHANGE UP
WBC # BLD: 3.6 K/UL — LOW (ref 3.8–10.5)
WBC # FLD AUTO: 3.6 K/UL — LOW (ref 3.8–10.5)
WBC UR QL: SIGNIFICANT CHANGE UP

## 2017-12-23 RX ORDER — SODIUM CHLORIDE 9 MG/ML
1000 INJECTION INTRAMUSCULAR; INTRAVENOUS; SUBCUTANEOUS
Qty: 0 | Refills: 0 | Status: DISCONTINUED | OUTPATIENT
Start: 2017-12-23 | End: 2017-12-24

## 2017-12-23 RX ADMIN — PANTOPRAZOLE SODIUM 40 MILLIGRAM(S): 20 TABLET, DELAYED RELEASE ORAL at 05:56

## 2017-12-23 RX ADMIN — SODIUM CHLORIDE 50 MILLILITER(S): 9 INJECTION INTRAMUSCULAR; INTRAVENOUS; SUBCUTANEOUS at 10:05

## 2017-12-23 RX ADMIN — Medication 1 MILLIGRAM(S): at 11:20

## 2017-12-23 RX ADMIN — PANTOPRAZOLE SODIUM 40 MILLIGRAM(S): 20 TABLET, DELAYED RELEASE ORAL at 17:58

## 2017-12-23 RX ADMIN — AMLODIPINE BESYLATE 10 MILLIGRAM(S): 2.5 TABLET ORAL at 05:56

## 2017-12-23 NOTE — CONSULT NOTE ADULT - ASSESSMENT
* Evaluation / management of anemia and back pain/tremor deferred to primary medical service.
89 y/o male with P vera diagnosed over 25 years ago- was on stable dose of Hydrea for many months but did not return to office for follow up counts since August. Now admitted with anemia/ evidence of GI bleeding.  ASA stopped. On PPI.  Transfused.  Hydrea on hold.   Plan to resume Hydrea at lower dose on Friday : 500 mg daily.   In the past his platelets rapidly wandy to ~ 1000K when he stopped Hydrea for several days.  Will need close CBC monitoring . Our office will try on Friday to arrange home draw CBC weekly by [facility].
Imp:  Anemia, guaiac +, dark appearance of stool suggests upper source, perhaps aspirin related.    Rec:  Stop ASA for now  Protonix bid  CT abd/pel to evaluate for overt GI pathology  Consider heme eval (Judi) regarding hydrea  No plans for endoscopic eval unless bleeding becomes more active
89 y//o wm with hx of PV presents with worsening LE tremors.  Noted with anemia with hgb of 6.9.  S/p total of 4 units of PRBc. During hospitalization with variable renal function, today with rising upward trend.  JOSE likely related to volume depletion from GI bleed.  r/o urinary retention    PLAN  - agree with gentle hydration  - bladder scan  - UA

## 2017-12-23 NOTE — PROVIDER CONTACT NOTE (OTHER) - DATE AND TIME:
20-Dec-2017 09:16
20-Dec-2017 13:15
21-Dec-2017 14:25
20-Dec-2017 00:53
20-Dec-2017 01:13
20-Dec-2017 01:14
23-Dec-2017 10:10

## 2017-12-23 NOTE — PROGRESS NOTE ADULT - SUBJECTIVE AND OBJECTIVE BOX
CC: tremors, acute on chronic back pain    HPI: This is a 89 yo M with PMH of polycythemia (on hydroxyurea), HTN, h/o back surgery, p/w LE tremors. Patient states he has had the tremors for years, but recently it has gotten worse and making it difficult to ambulate. Patient has been falling frequently due to this, denies hitting head or any trauma.     In ED, patient found to have Hb of 6.9 and PRBC transfusion was initiated. Patient denies hematochezia / menela / hematemesis / abdominal pain / nausea /vomiting / CP / SOB     12/22- patient was seen and examined.   Hb dropped to 7.9  No apparent bleed    12/23: Hb 9.2 after transfusion  no bleed  Cr increased to 2.15      PHYSICAL EXAM:    Daily     Daily     ICU Vital Signs Last 24 Hrs  T(C): 37.1 (23 Dec 2017 11:42), Max: 37.8 (22 Dec 2017 16:24)  T(F): 98.8 (23 Dec 2017 11:42), Max: 100.1 (22 Dec 2017 19:51)  HR: 56 (23 Dec 2017 11:42) (56 - 77)  BP: 123/45 (23 Dec 2017 11:42) (103/42 - 123/45)  BP(mean): --  ABP: --  ABP(mean): --  RR: 17 (23 Dec 2017 11:42) (17 - 181)  SpO2: 100% (23 Dec 2017 11:42) (96% - 100%)      Constitutional: Well appearing  HEENT: Atraumatic,   Respiratory: Breath Sounds normal, no rhonchi/wheeze  Cardiovascular: N S1S2; VALERIY present  Gastrointestinal: Abdomen soft, non tender, Bowel Sounds present  Extremities: No edema, peripheral pulses present  Neurological: AAO x 3, no gross focal motor deficits  Skin: Non cellulitic, no rash, ulcers  Breasts: Deferred  Genitourinary: deferred  Rectal: Deferred                          9.2    3.6   )-----------( 120      ( 23 Dec 2017 06:01 )             29.6       CBC Full  -  ( 23 Dec 2017 06:01 )  WBC Count : 3.6 K/uL  Hemoglobin : 9.2 g/dL  Hematocrit : 29.6 %  Platelet Count - Automated : 120 K/uL  Mean Cell Volume : 95.8 fl  Mean Cell Hemoglobin : 29.9 pg  Mean Cell Hemoglobin Concentration : 31.2 gm/dL  Auto Neutrophil # : x  Auto Lymphocyte # : x  Auto Monocyte # : x  Auto Eosinophil # : x  Auto Basophil # : x  Auto Neutrophil % : x  Auto Lymphocyte % : x  Auto Monocyte % : x  Auto Eosinophil % : x  Auto Basophil % : x      12-23    139  |  107  |  56<H>  ----------------------------<  85  4.4   |  23  |  2.15<H>    Ca    8.4<L>      23 Dec 2017 06:01                              MEDICATIONS  (STANDING):  amLODIPine   Tablet 10 milliGRAM(s) Oral daily  folic acid 1 milliGRAM(s) Oral daily  pantoprazole    Tablet 40 milliGRAM(s) Oral two times a day before meals  sodium chloride 0.9%. 1000 milliLiter(s) (50 mL/Hr) IV Continuous <Continuous>

## 2017-12-23 NOTE — CONSULT NOTE ADULT - SUBJECTIVE AND OBJECTIVE BOX
NEPHROLOGY INTERVAL HPI/OVERNIGHT EVENTS:  89 y/o wm with hx of PV on hydroxyurea presents with LE tremors with increasing frequency leading to falls.  Noted with hgb of 6.9 with + OCB.  This far has required 4 unit of PRBC with DC of ASA.  Further work-up deferred due to age.  Incidentally noted with lung nodule, no further work-up warrented.   Long standing hx of PV on hydroxyurea with adjustment of medication dose by Dr Lau.      Variable SCr during the course of the hospitalization.  Renal consult requested with increasing CR today.  + uop with no urinary retention.  Pt states that has been tolerating po well.  Upset that not dc'd today    PMHX/PSURGhx  - Polycytemia vera  - HTN  - back surgery  - chronic LE tremors      Allergies and Intolerances:        Allergies:  	No Known Allergies:     Home Medications:   * Patient Currently Takes Medications as of 19-Dec-2017 21:21 documented in Structured Notes  · 	cyclobenzaprine 5 mg oral tablet: 1 tab(s) orally once a day (at bedtime), Last Dose Taken:    · 	amLODIPine 10 mg oral tablet: 1 tab(s) orally once a day, Last Dose Taken:    · 	hydroxyurea 500 mg oral capsule: orally 3 times a day, Last Dose Taken:          MEDICATIONS  (STANDING):  amLODIPine   Tablet 10 milliGRAM(s) Oral daily  folic acid 1 milliGRAM(s) Oral daily  pantoprazole    Tablet 40 milliGRAM(s) Oral two times a day before meals  sodium chloride 0.9%. 1000 milliLiter(s) (50 mL/Hr) IV Continuous <Continuous>    MEDICATIONS  (PRN):  acetaminophen   Tablet 650 milliGRAM(s) Oral every 6 hours PRN For Temp greater than 38 C (100.4 F)      Allergies    No Known Allergies    Intolerances        I&O's Detail    22 Dec 2017 07:01  -  23 Dec 2017 07:00  --------------------------------------------------------  IN:    Oral Fluid: 120 mL    PRBCs (Packed Red Blood Cells): 649 mL  Total IN: 769 mL    OUT:  Total OUT: 0 mL    Total NET: 769 mL          Vital Signs Last 24 Hrs  T(C): 37.1 (23 Dec 2017 11:42), Max: 37.8 (22 Dec 2017 19:51)  T(F): 98.8 (23 Dec 2017 11:42), Max: 100.1 (22 Dec 2017 19:51)  HR: 56 (23 Dec 2017 11:42) (56 - 64)  BP: 123/45 (23 Dec 2017 11:42) (106/64 - 123/45)  BP(mean): --  RR: 17 (23 Dec 2017 11:42) (17 - 18)  SpO2: 100% (23 Dec 2017 11:42) (96% - 100%)  Daily     Daily     PHYSICAL EXAM:  General: alert. awake Ox3  HEENT: MMM  CV: s1s2 rrr  LUNGS: B/L CTA  EXT: no edema    LABS:                        9.2    3.6   )-----------( 120      ( 23 Dec 2017 06:01 )             29.6     12-23    139  |  107  |  56<H>  ----------------------------<  85  4.4   |  23  |  2.15<H>    Ca    8.4<L>      23 Dec 2017 06:01

## 2017-12-23 NOTE — PROGRESS NOTE ADULT - ASSESSMENT
89 yo M with PMH of polycythemia (on hydroxyurea), HTN, h/o back surgery, p/w LE tremors and acute on chronic low back pain found to have acute anemia:     *Anemia 2/2 GI bleed - likely acute on chronic blood loss anemia  - suspect UGIB, + hemoccult, given advanced age will hold on EGD/colonoscopy. Appreciate GI recs.   - s/p  2 U RBC with appropriate rise in H&H.   - 12/22- H/H dropped this morning. As per  conversation with Dr Lau, patient h/h around 12.5 to 14- so will transfuse with 2U PRBC today and will recheck H/H in AM.  - continue Protonix po   - likely Aspirin induced (was on 2 aspirins daily) -> STOP Aspirin.   - CT abdomen and pelvis showed 1.9 right lower lobe opacity concerning for neoplasm.- will obtain a chest CT non contrast  - iron studies results noted.  - Avoid NSAIDs / ASA   - Low folate; replace    # JOSE: low dose iv fluids  renal consult  recheck labs in am    # Abnormal EKG - no CP, check troponin, may have element of demand ischemia from GIB.   - cardiology consult appreciated.      *Leukopenia w/ h/o polycythemia   -Patient states he has a history of polycythemia and is on hydroxyurea  -Heme / onc consult appreciated  -Hydroxyurea can cause myelosuppression, holding for now.     *JOSE vs CKD - improvement in Scr 2 --> 1.6.  -Possibly 2/2 GI bleed  -UA negative     *Difficulty ambulating - LE tremors, Acute on Chronic Back Pain  - dc Neuro consult, suspect frequent falls from weakness / anemia and chronic back pain.   - PT eval  - Check Vit B12/ folate.   - mildly elevated TSH, check free T3.     *H/o HTN - normotensive.  -C/w amlodipine but may need lower dose.     *DVT ppx -SCDs     Code Status: lengthy discussion with patient who states he never thought about end of life planning before. Lives alone and inquires about Home Care vs Assisted Living.   - Unsure about code status, for now Full Code.       Dispo: if Hb stays steady and Cr improves, would d/c home on 12/24. Pt living independently in his home. pt eager to go home for holidays    poc discussed with pt, team.

## 2017-12-24 ENCOUNTER — TRANSCRIPTION ENCOUNTER (OUTPATIENT)
Age: 82
End: 2017-12-24

## 2017-12-24 VITALS
HEART RATE: 100 BPM | RESPIRATION RATE: 17 BRPM | OXYGEN SATURATION: 97 % | TEMPERATURE: 98 F | DIASTOLIC BLOOD PRESSURE: 81 MMHG | SYSTOLIC BLOOD PRESSURE: 140 MMHG

## 2017-12-24 LAB
ALBUMIN SERPL ELPH-MCNC: 3 G/DL — LOW (ref 3.3–5)
ANION GAP SERPL CALC-SCNC: 8 MMOL/L — SIGNIFICANT CHANGE UP (ref 5–17)
BUN SERPL-MCNC: 47 MG/DL — HIGH (ref 7–23)
CALCIUM SERPL-MCNC: 8.6 MG/DL — SIGNIFICANT CHANGE UP (ref 8.5–10.1)
CHLORIDE SERPL-SCNC: 107 MMOL/L — SIGNIFICANT CHANGE UP (ref 96–108)
CO2 SERPL-SCNC: 21 MMOL/L — LOW (ref 22–31)
CREAT SERPL-MCNC: 1.64 MG/DL — HIGH (ref 0.5–1.3)
GLUCOSE SERPL-MCNC: 89 MG/DL — SIGNIFICANT CHANGE UP (ref 70–99)
HCT VFR BLD CALC: 33.4 % — LOW (ref 39–50)
HGB BLD-MCNC: 10.5 G/DL — LOW (ref 13–17)
MCHC RBC-ENTMCNC: 29.9 PG — SIGNIFICANT CHANGE UP (ref 27–34)
MCHC RBC-ENTMCNC: 31.3 GM/DL — LOW (ref 32–36)
MCV RBC AUTO: 95.4 FL — SIGNIFICANT CHANGE UP (ref 80–100)
PHOSPHATE SERPL-MCNC: 2.8 MG/DL — SIGNIFICANT CHANGE UP (ref 2.5–4.5)
PLATELET # BLD AUTO: 157 K/UL — SIGNIFICANT CHANGE UP (ref 150–400)
POTASSIUM SERPL-MCNC: 4.3 MMOL/L — SIGNIFICANT CHANGE UP (ref 3.5–5.3)
POTASSIUM SERPL-SCNC: 4.3 MMOL/L — SIGNIFICANT CHANGE UP (ref 3.5–5.3)
RBC # BLD: 3.5 M/UL — LOW (ref 4.2–5.8)
RBC # FLD: 20.4 % — HIGH (ref 10.3–14.5)
SODIUM SERPL-SCNC: 136 MMOL/L — SIGNIFICANT CHANGE UP (ref 135–145)
WBC # BLD: 5.4 K/UL — SIGNIFICANT CHANGE UP (ref 3.8–10.5)
WBC # FLD AUTO: 5.4 K/UL — SIGNIFICANT CHANGE UP (ref 3.8–10.5)

## 2017-12-24 RX ORDER — PANTOPRAZOLE SODIUM 20 MG/1
1 TABLET, DELAYED RELEASE ORAL
Qty: 30 | Refills: 0 | OUTPATIENT
Start: 2017-12-24 | End: 2018-01-22

## 2017-12-24 RX ORDER — HYDROXYUREA 500 MG/1
0 CAPSULE ORAL
Qty: 0 | Refills: 0 | COMMUNITY

## 2017-12-24 RX ORDER — FOLIC ACID 0.8 MG
1 TABLET ORAL
Qty: 0 | Refills: 0 | COMMUNITY
Start: 2017-12-24

## 2017-12-24 RX ADMIN — PANTOPRAZOLE SODIUM 40 MILLIGRAM(S): 20 TABLET, DELAYED RELEASE ORAL at 05:27

## 2017-12-24 RX ADMIN — Medication 1 MILLIGRAM(S): at 12:39

## 2017-12-24 RX ADMIN — AMLODIPINE BESYLATE 10 MILLIGRAM(S): 2.5 TABLET ORAL at 05:27

## 2017-12-24 NOTE — DISCHARGE NOTE ADULT - PLAN OF CARE
Hb stable, check CBC in 2-3 days with your PCP f/u with your pcp and GI JOSE on CKD 3; Cr at baseline F/U with Dr. Villalobos in 2-5 days

## 2017-12-24 NOTE — DISCHARGE NOTE ADULT - HOSPITAL COURSE
CC: tremors, acute on chronic back pain    HPI: This is a 89 yo M with PMH of polycythemia (on hydroxyurea), HTN, h/o back surgery, p/w LE tremors. Patient states he has had the tremors for years, but recently it has gotten worse and making it difficult to ambulate. Patient has been falling frequently due to this, denies hitting head or any trauma.     In ED, patient found to have Hb of 6.9 and PRBC transfusion was initiated. Patient denies hematochezia / menela / hematemesis / abdominal pain / nausea /vomiting / CP / SOB     12/22- patient was seen and examined.   Hb dropped to 7.9  No apparent bleed    12/23: Hb 9.2 after transfusion  no bleed  Cr increased to 2.15    12/24: Hb 10.5  Cr 1.64; no complaints      89 yo M with PMH of polycythemia (on hydroxyurea), HTN, h/o back surgery, p/w LE tremors and acute on chronic low back pain found to have acute anemia:     *Anemia 2/2 GI bleed - likely acute on chronic blood loss anemia  - suspect UGIB, + hemoccult, given advanced age will hold on EGD/colonoscopy. Appreciate GI recs.   - s/p  2 U RBC with appropriate rise in H&H.   - 12/22- H/H dropped this morning. As per  conversation with Dr Lau, patient h/h around 12.5 to 14- so will transfuse with 2U PRBC today and will recheck H/H in AM.  - continue Protonix po   - likely Aspirin induced (was on 2 aspirins daily) -> STOP Aspirin.   - CT abdomen and pelvis showed 1.9 right lower lobe opacity concerning for neoplasm.- will obtain a chest CT non contrast  - iron studies results noted.  - Avoid NSAIDs / ASA   - Low folate; replace    # JOSE: better with low dose iv fluids  renal consult appreciated  Cr dropped to 1.64 today; looks like his baseline is around 1.6-1.7  spoke with Dr. Villalobos; she would f/u him as out pt.     # Abnormal EKG - no CP, check troponin, may have element of demand ischemia from GIB.   - cardiology consult appreciated.      *Leukopenia w/ h/o polycythemia   -Patient states he has a history of polycythemia and is on hydroxyurea  -Heme / onc consult appreciated  -Hydroxyurea can cause myelosuppression, holding for now.     *Difficulty ambulating - lives independently at home  walking here     *H/o HTN - normotensive.  -C/w amlodipine          d/c home today; pt eager to go home for [place]    poc discussed with pt, team; Dr. Villalobos CC: tremors, acute on chronic back pain    HPI: This is a 89 yo M with PMH of polycythemia (on hydroxyurea), HTN, h/o back surgery, p/w LE tremors. Patient states he has had the tremors for years, but recently it has gotten worse and making it difficult to ambulate. Patient has been falling frequently due to this, denies hitting head or any trauma.     In ED, patient found to have Hb of 6.9 and PRBC transfusion was initiated. Patient denies hematochezia / menela / hematemesis / abdominal pain / nausea /vomiting / CP / SOB     12/22- patient was seen and examined.   Hb dropped to 7.9  No apparent bleed    12/23: Hb 9.2 after transfusion  no bleed  Cr increased to 2.15    12/24: Hb 10.5  Cr 1.64; no complaints      89 yo M with PMH of polycythemia (on hydroxyurea), HTN, h/o back surgery, p/w LE tremors and acute on chronic low back pain found to have acute anemia:     *Anemia 2/2 GI bleed - likely acute on chronic blood loss anemia  - suspect UGIB, + hemoccult, given advanced age will hold on EGD/colonoscopy. Appreciate GI recs.   - s/p  2 U RBC with appropriate rise in H&H.   - 12/22- H/H dropped this morning. As per  conversation with Dr Lau, patient h/h around 12.5 to 14- so will transfuse with 2U PRBC today and will recheck H/H in AM.  - continue Protonix po   - likely Aspirin induced (was on 2 aspirins daily) -> STOP Aspirin.   - CT abdomen and pelvis showed 1.9 right lower lobe opacity concerning for neoplasm.- will obtain a chest CT non contrast  - iron studies results noted.  - Avoid NSAIDs / ASA   - Low folate; replace    # JOSE: better with low dose iv fluids  renal consult appreciated  Cr dropped to 1.64 today; looks like his baseline is around 1.6-1.7  spoke with Dr. Villalobos; she would f/u him as out pt.     # Abnormal EKG - no CP, check troponin, may have element of demand ischemia from GIB.   - cardiology consult appreciated.      *Leukopenia w/ h/o polycythemia   -Patient states he has a history of polycythemia and is on hydroxyurea  -Heme / onc consult appreciated  -Hydroxyurea can cause myelosuppression, holding for now.     *Difficulty ambulating - lives independently at home  walking here     *H/o HTN - normotensive.  -C/w amlodipine          d/c home today; pt eager to go home for [place]    poc discussed with pt, team; Dr. Villalobos    total time spent 45 min

## 2017-12-24 NOTE — DISCHARGE NOTE ADULT - MEDICATION SUMMARY - MEDICATIONS TO STOP TAKING
I will STOP taking the medications listed below when I get home from the hospital:    hydroxyurea 500 mg oral capsule  -- orally 3 times a day

## 2017-12-24 NOTE — DISCHARGE NOTE ADULT - MEDICATION SUMMARY - MEDICATIONS TO TAKE
I will START or STAY ON the medications listed below when I get home from the hospital:    amLODIPine 10 mg oral tablet  -- 1 tab(s) by mouth once a day  -- Indication: For HTN (hypertension)    cyclobenzaprine 5 mg oral tablet  -- 1 tab(s) by mouth once a day (at bedtime)  -- Indication: For spasm    pantoprazole 40 mg oral delayed release tablet  -- 1 tab(s) by mouth once a day   -- Indication: For ppx    folic acid 1 mg oral tablet  -- 1 tab(s) by mouth once a day  -- Indication: For suppl

## 2017-12-24 NOTE — DISCHARGE NOTE ADULT - PATIENT PORTAL LINK FT
“You can access the Follow[facility] Patient Portal, offered by [facility], by registering with the following website: [email or web address]”

## 2017-12-24 NOTE — DISCHARGE NOTE ADULT - CARE PLAN
Principal Discharge DX:	Gastrointestinal hemorrhage, unspecified gastrointestinal hemorrhage type  Goal:	Hb stable, check CBC in 2-3 days with your PCP  Instructions for follow-up, activity and diet:	f/u with your pcp and GI  Secondary Diagnosis:	JOSE (acute kidney injury)  Goal:	JOSE on CKD 3; Cr at baseline  Instructions for follow-up, activity and diet:	F/U with Dr. Villalobos in 2-5 days

## 2017-12-24 NOTE — DISCHARGE NOTE ADULT - INSTRUCTIONS
Return to Er or call MD if you experience fever greater than 101, increased falls, any signs of bleeding, shortness of breath or chest pain. Continue medications as prescribed and follow up with doctors as instructed.

## 2017-12-24 NOTE — DISCHARGE NOTE ADULT - CARE PROVIDER_API CALL
Patti Villalobos), Internal Medicine; Nephrology  [address] [facility]  [address]  [place] [address]  Phone: [phone]  Fax: [phone]    Mohit Hermosillo (MD), Cardiovascular Disease  [address] [place] [address]  Phone: [phone]  Fax: [phone]    Baldemar Shaikh), Gastroenterology; Internal Medicine  [address] [facility]  [address] [place] [address]  Phone: [phone]  Fax: [phone]

## 2017-12-24 NOTE — DISCHARGE NOTE ADULT - CARE PROVIDERS DIRECT ADDRESSES
,DirectAddress_Unknown,[email or web address],DirectAddress_Unknown

## 2018-01-02 DIAGNOSIS — R25.1 TREMOR, UNSPECIFIED: ICD-10-CM

## 2018-01-02 DIAGNOSIS — D62 ACUTE POSTHEMORRHAGIC ANEMIA: ICD-10-CM

## 2018-01-02 DIAGNOSIS — N17.9 ACUTE KIDNEY FAILURE, UNSPECIFIED: ICD-10-CM

## 2018-01-02 DIAGNOSIS — I45.10 UNSPECIFIED RIGHT BUNDLE-BRANCH BLOCK: ICD-10-CM

## 2018-01-02 DIAGNOSIS — D69.6 THROMBOCYTOPENIA, UNSPECIFIED: ICD-10-CM

## 2018-01-02 DIAGNOSIS — K92.2 GASTROINTESTINAL HEMORRHAGE, UNSPECIFIED: ICD-10-CM

## 2018-01-02 DIAGNOSIS — R00.1 BRADYCARDIA, UNSPECIFIED: ICD-10-CM

## 2018-01-02 DIAGNOSIS — I10 ESSENTIAL (PRIMARY) HYPERTENSION: ICD-10-CM

## 2018-01-02 DIAGNOSIS — D75.1 SECONDARY POLYCYTHEMIA: ICD-10-CM

## 2018-01-02 DIAGNOSIS — D72.819 DECREASED WHITE BLOOD CELL COUNT, UNSPECIFIED: ICD-10-CM

## 2018-01-02 DIAGNOSIS — T39.015A ADVERSE EFFECT OF ASPIRIN, INITIAL ENCOUNTER: ICD-10-CM

## 2018-01-02 DIAGNOSIS — R91.8 OTHER NONSPECIFIC ABNORMAL FINDING OF LUNG FIELD: ICD-10-CM

## 2018-01-02 DIAGNOSIS — I24.8 OTHER FORMS OF ACUTE ISCHEMIC HEART DISEASE: ICD-10-CM

## 2018-01-02 DIAGNOSIS — Z79.82 LONG TERM (CURRENT) USE OF ASPIRIN: ICD-10-CM

## 2018-01-02 DIAGNOSIS — D50.0 IRON DEFICIENCY ANEMIA SECONDARY TO BLOOD LOSS (CHRONIC): ICD-10-CM

## 2018-01-02 DIAGNOSIS — G89.29 OTHER CHRONIC PAIN: ICD-10-CM

## 2018-01-10 ENCOUNTER — APPOINTMENT (OUTPATIENT)
Dept: CARDIOLOGY | Facility: CLINIC | Age: 83
End: 2018-01-10
Payer: MEDICARE

## 2018-01-10 VITALS
BODY MASS INDEX: 20.49 KG/M2 | TEMPERATURE: 97.8 F | RESPIRATION RATE: 12 BRPM | SYSTOLIC BLOOD PRESSURE: 120 MMHG | OXYGEN SATURATION: 100 % | WEIGHT: 123 LBS | DIASTOLIC BLOOD PRESSURE: 64 MMHG | HEART RATE: 87 BPM | HEIGHT: 65 IN

## 2018-01-10 PROCEDURE — 93000 ELECTROCARDIOGRAM COMPLETE: CPT

## 2018-01-10 PROCEDURE — 99214 OFFICE O/P EST MOD 30 MIN: CPT | Mod: 25

## 2018-01-10 RX ORDER — HYDROXYUREA 500 MG/1
500 CAPSULE ORAL
Refills: 0 | Status: DISCONTINUED | COMMUNITY
End: 2018-01-10

## 2018-01-12 ENCOUNTER — NON-APPOINTMENT (OUTPATIENT)
Age: 83
End: 2018-01-12

## 2018-01-12 ENCOUNTER — INPATIENT (INPATIENT)
Facility: HOSPITAL | Age: 83
LOS: 4 days | Discharge: NURSING, REHAB OR LONG-TERM CARE | End: 2018-01-17
Attending: FAMILY MEDICINE | Admitting: FAMILY MEDICINE
Payer: MEDICARE

## 2018-01-12 VITALS
WEIGHT: 145.06 LBS | OXYGEN SATURATION: 100 % | DIASTOLIC BLOOD PRESSURE: 87 MMHG | TEMPERATURE: 98 F | HEART RATE: 88 BPM | SYSTOLIC BLOOD PRESSURE: 150 MMHG | RESPIRATION RATE: 16 BRPM

## 2018-01-12 DIAGNOSIS — Z98.890 OTHER SPECIFIED POSTPROCEDURAL STATES: Chronic | ICD-10-CM

## 2018-01-12 LAB
ALBUMIN SERPL ELPH-MCNC: 3 G/DL — LOW (ref 3.3–5)
ALP SERPL-CCNC: 82 U/L — SIGNIFICANT CHANGE UP (ref 40–120)
ALT FLD-CCNC: 20 U/L — SIGNIFICANT CHANGE UP (ref 12–78)
ANION GAP SERPL CALC-SCNC: 13 MMOL/L — SIGNIFICANT CHANGE UP (ref 5–17)
APPEARANCE UR: CLEAR — SIGNIFICANT CHANGE UP
APTT BLD: 33.2 SEC — SIGNIFICANT CHANGE UP (ref 27.5–37.4)
AST SERPL-CCNC: 48 U/L — HIGH (ref 15–37)
BASOPHILS # BLD AUTO: 0.1 K/UL — SIGNIFICANT CHANGE UP (ref 0–0.2)
BASOPHILS # BLD AUTO: 0.2 K/UL — SIGNIFICANT CHANGE UP (ref 0–0.2)
BASOPHILS NFR BLD AUTO: 0.5 % — SIGNIFICANT CHANGE UP (ref 0–2)
BASOPHILS NFR BLD AUTO: 0.8 % — SIGNIFICANT CHANGE UP (ref 0–2)
BILIRUB SERPL-MCNC: 0.3 MG/DL — SIGNIFICANT CHANGE UP (ref 0.2–1.2)
BILIRUB UR-MCNC: NEGATIVE — SIGNIFICANT CHANGE UP
BUN SERPL-MCNC: 59 MG/DL — HIGH (ref 7–23)
CALCIUM SERPL-MCNC: 9.5 MG/DL — SIGNIFICANT CHANGE UP (ref 8.5–10.1)
CHLORIDE SERPL-SCNC: 111 MMOL/L — HIGH (ref 96–108)
CK SERPL-CCNC: 949 U/L — HIGH (ref 26–308)
CO2 SERPL-SCNC: 19 MMOL/L — LOW (ref 22–31)
COLOR SPEC: YELLOW — SIGNIFICANT CHANGE UP
CREAT SERPL-MCNC: 1.83 MG/DL — HIGH (ref 0.5–1.3)
DIFF PNL FLD: (no result)
EOSINOPHIL # BLD AUTO: 0.1 K/UL — SIGNIFICANT CHANGE UP (ref 0–0.5)
EOSINOPHIL # BLD AUTO: 0.1 K/UL — SIGNIFICANT CHANGE UP (ref 0–0.5)
EOSINOPHIL NFR BLD AUTO: 0.4 % — SIGNIFICANT CHANGE UP (ref 0–6)
EOSINOPHIL NFR BLD AUTO: 0.6 % — SIGNIFICANT CHANGE UP (ref 0–6)
GLUCOSE SERPL-MCNC: 82 MG/DL — SIGNIFICANT CHANGE UP (ref 70–99)
GLUCOSE UR QL: NEGATIVE MG/DL — SIGNIFICANT CHANGE UP
HADV DNA SPEC QL NAA+PROBE: SIGNIFICANT CHANGE UP
HCT VFR BLD CALC: 36.5 % — LOW (ref 39–50)
HCT VFR BLD CALC: 37.6 % — LOW (ref 39–50)
HGB BLD-MCNC: 11.4 G/DL — LOW (ref 13–17)
HGB BLD-MCNC: 11.9 G/DL — LOW (ref 13–17)
INR BLD: 1.16 RATIO — SIGNIFICANT CHANGE UP (ref 0.88–1.16)
KETONES UR-MCNC: (no result)
LACTATE SERPL-SCNC: 1 MMOL/L — SIGNIFICANT CHANGE UP (ref 0.7–2)
LEUKOCYTE ESTERASE UR-ACNC: NEGATIVE — SIGNIFICANT CHANGE UP
LYMPHOCYTES # BLD AUTO: 0.4 K/UL — LOW (ref 1–3.3)
LYMPHOCYTES # BLD AUTO: 0.7 K/UL — LOW (ref 1–3.3)
LYMPHOCYTES # BLD AUTO: 1.7 % — LOW (ref 13–44)
LYMPHOCYTES # BLD AUTO: 2.6 % — LOW (ref 13–44)
MCHC RBC-ENTMCNC: 28.7 PG — SIGNIFICANT CHANGE UP (ref 27–34)
MCHC RBC-ENTMCNC: 28.9 PG — SIGNIFICANT CHANGE UP (ref 27–34)
MCHC RBC-ENTMCNC: 31.2 GM/DL — LOW (ref 32–36)
MCHC RBC-ENTMCNC: 31.7 GM/DL — LOW (ref 32–36)
MCV RBC AUTO: 91.2 FL — SIGNIFICANT CHANGE UP (ref 80–100)
MCV RBC AUTO: 91.9 FL — SIGNIFICANT CHANGE UP (ref 80–100)
MONOCYTES # BLD AUTO: 0.7 K/UL — SIGNIFICANT CHANGE UP (ref 0–0.9)
MONOCYTES # BLD AUTO: 0.9 K/UL — SIGNIFICANT CHANGE UP (ref 0–0.9)
MONOCYTES NFR BLD AUTO: 3.1 % — SIGNIFICANT CHANGE UP (ref 2–14)
MONOCYTES NFR BLD AUTO: 3.6 % — SIGNIFICANT CHANGE UP (ref 2–14)
NEUTROPHILS # BLD AUTO: 21.6 K/UL — HIGH (ref 1.8–7.4)
NEUTROPHILS # BLD AUTO: 23.5 K/UL — HIGH (ref 1.8–7.4)
NEUTROPHILS NFR BLD AUTO: 92.6 % — HIGH (ref 43–77)
NEUTROPHILS NFR BLD AUTO: 94.1 % — HIGH (ref 43–77)
NITRITE UR-MCNC: NEGATIVE — SIGNIFICANT CHANGE UP
PH UR: 5 — SIGNIFICANT CHANGE UP (ref 5–8)
PLATELET # BLD AUTO: 1216 K/UL — CRITICAL HIGH (ref 150–400)
PLATELET # BLD AUTO: 1315 K/UL — CRITICAL HIGH (ref 150–400)
POTASSIUM SERPL-MCNC: 5 MMOL/L — SIGNIFICANT CHANGE UP (ref 3.5–5.3)
POTASSIUM SERPL-SCNC: 5 MMOL/L — SIGNIFICANT CHANGE UP (ref 3.5–5.3)
PROT SERPL-MCNC: 7.2 GM/DL — SIGNIFICANT CHANGE UP (ref 6–8.3)
PROT UR-MCNC: 30 MG/DL
PROTHROM AB SERPL-ACNC: 12.6 SEC — SIGNIFICANT CHANGE UP (ref 9.8–12.7)
RAPID RVP RESULT: SIGNIFICANT CHANGE UP
RBC # BLD: 3.97 M/UL — LOW (ref 4.2–5.8)
RBC # BLD: 4.12 M/UL — LOW (ref 4.2–5.8)
RBC # FLD: 20.1 % — HIGH (ref 10.3–14.5)
RBC # FLD: 20.1 % — HIGH (ref 10.3–14.5)
SODIUM SERPL-SCNC: 143 MMOL/L — SIGNIFICANT CHANGE UP (ref 135–145)
SP GR SPEC: 1.02 — SIGNIFICANT CHANGE UP (ref 1.01–1.02)
TROPONIN I SERPL-MCNC: 0.06 NG/ML — HIGH (ref 0.01–0.04)
UROBILINOGEN FLD QL: NEGATIVE MG/DL — SIGNIFICANT CHANGE UP
WBC # BLD: 23 K/UL — HIGH (ref 3.8–10.5)
WBC # BLD: 25.4 K/UL — HIGH (ref 3.8–10.5)
WBC # FLD AUTO: 23 K/UL — HIGH (ref 3.8–10.5)
WBC # FLD AUTO: 25.4 K/UL — HIGH (ref 3.8–10.5)

## 2018-01-12 PROCEDURE — 71250 CT THORAX DX C-: CPT | Mod: 26

## 2018-01-12 PROCEDURE — 71045 X-RAY EXAM CHEST 1 VIEW: CPT | Mod: 26

## 2018-01-12 PROCEDURE — 74176 CT ABD & PELVIS W/O CONTRAST: CPT | Mod: 26

## 2018-01-12 PROCEDURE — 72125 CT NECK SPINE W/O DYE: CPT | Mod: 26

## 2018-01-12 PROCEDURE — 93010 ELECTROCARDIOGRAM REPORT: CPT

## 2018-01-12 PROCEDURE — 99285 EMERGENCY DEPT VISIT HI MDM: CPT

## 2018-01-12 PROCEDURE — 72190 X-RAY EXAM OF PELVIS: CPT | Mod: 26

## 2018-01-12 PROCEDURE — 70450 CT HEAD/BRAIN W/O DYE: CPT | Mod: 26

## 2018-01-12 RX ORDER — HEPARIN SODIUM 5000 [USP'U]/ML
5000 INJECTION INTRAVENOUS; SUBCUTANEOUS EVERY 12 HOURS
Qty: 0 | Refills: 0 | Status: DISCONTINUED | OUTPATIENT
Start: 2018-01-12 | End: 2018-01-13

## 2018-01-12 RX ORDER — ACETAMINOPHEN 500 MG
650 TABLET ORAL EVERY 6 HOURS
Qty: 0 | Refills: 0 | Status: DISCONTINUED | OUTPATIENT
Start: 2018-01-12 | End: 2018-01-17

## 2018-01-12 RX ORDER — SODIUM CHLORIDE 9 MG/ML
500 INJECTION INTRAMUSCULAR; INTRAVENOUS; SUBCUTANEOUS
Qty: 0 | Refills: 0 | Status: DISCONTINUED | OUTPATIENT
Start: 2018-01-12 | End: 2018-01-17

## 2018-01-12 RX ORDER — CEFEPIME 1 G/1
500 INJECTION, POWDER, FOR SOLUTION INTRAMUSCULAR; INTRAVENOUS EVERY 24 HOURS
Qty: 0 | Refills: 0 | Status: DISCONTINUED | OUTPATIENT
Start: 2018-01-12 | End: 2018-01-14

## 2018-01-12 RX ORDER — VANCOMYCIN HCL 1 G
1000 VIAL (EA) INTRAVENOUS ONCE
Qty: 0 | Refills: 0 | Status: COMPLETED | OUTPATIENT
Start: 2018-01-12 | End: 2018-01-12

## 2018-01-12 RX ORDER — DOCUSATE SODIUM 100 MG
100 CAPSULE ORAL THREE TIMES A DAY
Qty: 0 | Refills: 0 | Status: DISCONTINUED | OUTPATIENT
Start: 2018-01-12 | End: 2018-01-17

## 2018-01-12 RX ORDER — ASPIRIN/CALCIUM CARB/MAGNESIUM 324 MG
325 TABLET ORAL ONCE
Qty: 0 | Refills: 0 | Status: COMPLETED | OUTPATIENT
Start: 2018-01-12 | End: 2018-01-12

## 2018-01-12 RX ORDER — ONDANSETRON 8 MG/1
4 TABLET, FILM COATED ORAL EVERY 6 HOURS
Qty: 0 | Refills: 0 | Status: DISCONTINUED | OUTPATIENT
Start: 2018-01-12 | End: 2018-01-17

## 2018-01-12 RX ORDER — CEFEPIME 1 G/1
1000 INJECTION, POWDER, FOR SOLUTION INTRAMUSCULAR; INTRAVENOUS ONCE
Qty: 0 | Refills: 0 | Status: COMPLETED | OUTPATIENT
Start: 2018-01-12 | End: 2018-01-12

## 2018-01-12 RX ORDER — SODIUM CHLORIDE 9 MG/ML
1000 INJECTION INTRAMUSCULAR; INTRAVENOUS; SUBCUTANEOUS
Qty: 0 | Refills: 0 | Status: DISCONTINUED | OUTPATIENT
Start: 2018-01-12 | End: 2018-01-17

## 2018-01-12 RX ORDER — AMLODIPINE BESYLATE 2.5 MG/1
10 TABLET ORAL DAILY
Qty: 0 | Refills: 0 | Status: DISCONTINUED | OUTPATIENT
Start: 2018-01-12 | End: 2018-01-17

## 2018-01-12 RX ADMIN — Medication 250 MILLIGRAM(S): at 21:39

## 2018-01-12 RX ADMIN — SODIUM CHLORIDE 250 MILLILITER(S): 9 INJECTION INTRAMUSCULAR; INTRAVENOUS; SUBCUTANEOUS at 20:07

## 2018-01-12 RX ADMIN — CEFEPIME 100 MILLIGRAM(S): 1 INJECTION, POWDER, FOR SOLUTION INTRAMUSCULAR; INTRAVENOUS at 22:39

## 2018-01-12 RX ADMIN — Medication 325 MILLIGRAM(S): at 20:38

## 2018-01-12 NOTE — ED PROVIDER NOTE - ATTENDING CONTRIBUTION TO CARE
I, Rosenda Valladares MD, personally saw the patient with the resident, and completed the key components of the history and physical exam. I then discussed the management plan with the resident.

## 2018-01-12 NOTE — ED PROVIDER NOTE - MEDICAL DECISION MAKING DETAILS
89 year old male past medical history polycythemia, GI bleed, HTN, presents to the ED for fall. Patient is Axox3 but confused and unsure why he is here. He does not remember falling. denies pain. Unclear if recent illness. Will obtain labwork, ekg, ct head,  cervical, no other trauma to suggest need for pan scan at this time. Possible admission for frequent falls/social admission as patient lives alone.

## 2018-01-12 NOTE — ED PROVIDER NOTE - CARE PLAN
Principal Discharge DX:	Altered mental status  Secondary Diagnosis:	Rhabdomyolysis  Secondary Diagnosis:	Fall, initial encounter

## 2018-01-12 NOTE — ED ADULT TRIAGE NOTE - CHIEF COMPLAINT QUOTE
pt presents to ED due to second fall in 24 hours pt lives alone neighbors came to check on pt and pt had fallen again, denies hitting head denies any pain pt did not want to come to ED

## 2018-01-12 NOTE — H&P ADULT - HISTORY OF PRESENT ILLNESS
History obtained by EMS and Niece as patient is a poor historian    89 year old male with past medical history of polycythemia, HTN, recently admitted for GIB (12/17), BIBA for recurrent falls. Patient was found at home crawling on the floor by [employer]. Patient was reportedly found to be crawling on the floor and had urinated on himself. Niece was informed who eventually called EMS services to evaluate patient. Patient was brought in by EMS to [facility] for further evaluation. Patient has no children and lives alone. His closest relative is his niece who lives in [place].    In the ED, patient was to have thrombocytosis and Leukocytosis. Patient was treated empirically with Vancomycin and Cefepime.

## 2018-01-12 NOTE — H&P ADULT - ASSESSMENT
89 year old male with past medical history of polycythemia, HTN, recently admitted for GIB (12/17), BIBA for recurrent falls    #Recurrent Falls  - CT Head: Age-Related Changes  - CT C-Spine: No fracture of Subluxation  - Pelvic Xray (Prelim) - ?Fracture of Neck of Femur  - PT Consult  - SW Consult    #HTN  - BP Controlled  - Continue Norvasc    #Myeloproliferative Dx  - Leukocytosis and Thrombocytosis  - Previously taking Hydroxyurea however was discontinued on previous admission secondary to GIB  - Start Hydroxyurea  - Heme/Onc Consult    #Leukocytosis  - s/p Vancomycin and Cefepime  - No clear source on UA,     #JOSE on ?CKD  - Gently IV Hydration  - Trend Creatinine    #RLL Opacity concerns for Malignancy  - CT Chest: 1.9 cm RLL Opacity with concerns over potential malignancy  - Outpatient follow-up 89 year old male with past medical history of polycythemia, HTN, recently admitted for GIB (12/17), BIBA for recurrent falls    #Recurrent Falls  - CT Head: Age-Related Changes  - CT C-Spine: No fracture of Subluxation  - Order CT T-Spine, L-Spine  - Follow-up Pelvic Xray  - PT Consult  - SW Consult    #AMS  - Likely secondary to metabolic encephalopathy  - CT Head: Age-related Changes  - Neurology Consult  - B12, TSH    #HTN  - BP Controlled  - Continue Norvasc    #Myeloproliferative Dx  - Leukocytosis and Thrombocytosis  - Previously taking Hydroxyurea however was discontinued on previous admission secondary to GIB  - Heme/Onc Consult  - Consider restarting Hydroxyurea    #Leukocytosis  - s/p Vancomycin and Cefepime  - UA: Neg  - CXR: No Consolidations  - Follow-up Blood Cultures, Urine Cultures  - Order CT Chest/ABD/Pelvis    #JOSE on ?CKD  - Gentle IV Hydration  - Trend Creatinine    #RLL Opacity concerns for Malignancy  - CT Chest: 1.9 cm RLL Opacity with concerns over potential malignancy  - Repeat Chest CT  - Outpatient follow-up 89 year old male with past medical history of polycythemia, HTN, recently admitted for GIB (12/17), BIBA for recurrent falls    #Recurrent Falls  - CT Head: Age-Related Changes  - CT C-Spine: No fracture of Subluxation  - Order CT T-Spine, L-Spine  - Follow-up Pelvic Xray  - PT Consult  - SW Consult    #AMS  - Likely secondary to metabolic encephalopathy  - CT Head: Age-related Changes  - Neurology Consult  - B12, TSH    #HTN  - BP Controlled  - Continue Norvasc    #Myeloproliferative Dx  - Leukocytosis and Thrombocytosis  - Previously taking Hydroxyurea however was discontinued on previous admission secondary to GIB  - Heme/Onc Consult  - Consider restarting Hydroxyurea    #Leukocytosis  - s/p Vancomycin and Cefepime in ED  - Continue Cefepime   - UA: Neg  - CXR: No Consolidations  - Follow-up Blood Cultures, Urine Cultures  - Order CT Chest/ABD/Pelvis    #JOSE on ?CKD  - Gentle IV Hydration  - Trend Creatinine    #RLL Opacity concerns for Malignancy  - CT Chest (12/17): 1.9 cm RLL Opacity with concerns over potential malignancy  - Repeat Chest CT  - Outpatient follow-up 89 year old male with past medical history of polycythemia, HTN, recently admitted for GIB (12/17), BIBA for recurrent falls    #Recurrent Falls  - CT Head: Age-Related Changes  - CT C-Spine: No fracture of Subluxation  - Order CT T-Spine, L-Spine  - Follow-up Pelvic Xray  - PT Consult  - SW Consult    #AMS  - Likely secondary to metabolic encephalopathy  - CT Head: Age-related Changes  - Neurology Consult  - B12, TSH    #HTN  - BP Controlled  - Continue Norvasc    #Myeloproliferative Dx  - Leukocytosis and Thrombocytosis  - Previously taking Hydroxyurea however was discontinued on previous admission secondary to GIB  - Heme/Onc Consult  - Consider restarting Hydroxyurea    #Leukocytosis  - DDX: Myeloproliferative vs Stress vs Infectious  - s/p Vancomycin and Cefepime in ED  - Continue Cefepime 500mg q24   - UA: Neg  - CXR: No Consolidations  - Follow-up Blood Cultures, Urine Cultures  - Order CT Chest/ABD/Pelvis    #JOSE on ?CKD  - Gentle IV Hydration  - Trend Creatinine    #RLL Opacity concerns for Malignancy  - CT Chest (12/17): 1.9 cm RLL Opacity with concerns over potential malignancy  - Repeat Chest CT  - Outpatient follow-up

## 2018-01-12 NOTE — H&P ADULT - NSHPREVIEWOFSYSTEMS_GEN_ALL_CORE
Difficult to obtain as patient is a poor historian    REVIEW OF SYSTEMS:  CONSTITUTIONAL: No weakness, fevers or chills  EYES/ENT: No visual changes;  No vertigo or throat pain   NECK: No pain or stiffness  RESPIRATORY: No cough, wheezing, hemoptysis; No shortness of breath  CARDIOVASCULAR: No chest pain or palpitations  GASTROINTESTINAL: No abdominal or epigastric pain. No nausea, vomiting, or hematemesis; No diarrhea or constipation. No melena or hematochezia.  GENITOURINARY: No dysuria, frequency or hematuria  NEUROLOGICAL: No numbness or weakness  SKIN: No itching, burning, rashes, or lesions   MSK: Back Pain, Left Hip Pain  All other review of systems is negative unless indicated above.

## 2018-01-12 NOTE — H&P ADULT - NSHPPHYSICALEXAM_GEN_ALL_CORE
T(C): 36.3 (01-12-18 @ 19:40), Max: 36.9 (01-12-18 @ 18:31)  HR: 82 (01-12-18 @ 19:40) (82 - 88)  BP: 112/62 (01-12-18 @ 19:40) (112/62 - 150/87)  RR: 20 (01-12-18 @ 19:40) (16 - 20)  SpO2: 100% (01-12-18 @ 19:40) (100% - 100%)  Wt(kg): --    PHYSICAL EXAM:  GENERAL: NAD,  HEAD:  NC/AT, scaly lesions present on scalp  EYES: EOMI, PERRLA, no scleral icterus  HEENT: Dry mucous membranes  NECK: Supple, No JVD  CNS:  Alert & Oriented X2,  LUNG: Clear to auscultation bilaterally; No rales, rhonchi, wheezing, or rubs  HEART: RRR; No murmurs, rubs, or gallops  ABDOMEN: +BS, ST/ND/NT  GENITOURINARY- Voiding, Bladder not distended  EXTREMITIES:  2+ Peripheral Pulses, No clubbing, cyanosis, or edema  MUSCULOSKELTAL- TTP of the Left Greater Trochanter

## 2018-01-12 NOTE — H&P ADULT - ATTENDING COMMENTS
Patient was seen and examined after initial evaluation above my family medicine resident. Case discussed and reviewed in detail. Please note my plan below.     89 yo M with PMH of polycythemia, HTN, h/o back surgery, presents after being found on the floor.    *AMS - 2/2 toxic metabolic encephalopathy vs worsening dementia  -Neuro consult  -Vit B12 / TSH  -Neuro checks  -Fall precautions  -PT  -SW consult     *Leukocytosis / thrombocytosis / anemia w/ h/o polycythemia  -Heme consult for further recommendations. Patient is no longer on hydroxyurea  -Given significant leukocytosis -> will give broad specrum abx until infection is ruled out - will get chest CT / abdomen   -UA is negative   -RVP pending    *Acute on CKD  -IVF and trend creatinine in Am for improvement  -I/Os   -Avoid nephrotoxic medications     *Decreased bicarb - possible acidosis??  -Repeat BMP in AM, and if still decreased -> may need ABG to further characterize acid base disorder    *HTN / transaminitis   -C/w home meds + outpatient f/u for further management    *Elevated CK and troponin  -Denies CP  -Possibly from being on the floor for a long time  -Tele monitoring  -LYNNETTE + Serial EKGs  -S/p ASA  -Cardio consult     *DVT ppx  -Heparin SubQ Patient was seen and examined after initial evaluation above my family medicine resident. Case discussed and reviewed in detail. Please note my plan below.     89 yo M with PMH of polycythemia, HTN, h/o back surgery, presents after being found on the floor.    *AMS - 2/2 toxic metabolic encephalopathy vs worsening dementia   -Neuro consult  -Vit B12 / TSH  -Neuro checks  -Fall precautions  -PT  -SW consult     *Leukocytosis / thrombocytosis / anemia w/ h/o polycythemia  -Heme consult for further recommendations. Patient is no longer on hydroxyurea  -Given significant leukocytosis -> will give broad specrum abx until infection is ruled out - will get chest CT / abdomen   -UA is negative   -RVP pending    *Acute on CKD  -IVF and trend creatinine in Am for improvement  -I/Os   -Avoid nephrotoxic medications     *Decreased bicarb - possible acidosis??  -Repeat BMP in AM, and if still decreased -> may need ABG to further characterize acid base disorder    *HTN / transaminitis   -C/w home meds + outpatient f/u for further management    *Elevated CK and troponin  -Denies CP  -Possibly from being on the floor for a long time  -Tele monitoring  -LYNNETTE + Serial EKGs  -S/p ASA  -Cardio consult     *DVT ppx  -Heparin SubQ

## 2018-01-12 NOTE — ED PROVIDER NOTE - PROGRESS NOTE DETAILS
89 year old male seen and examined by me. Agree with resident note and plan. 89 yo with 2 recent falls per neighbor, and confused in ED. Was recently admitted for low Hgb 2/2 likely UGIB, no egd done. Not on blood thinners. Pt denies complaints. Denies falling. Pt is thin and chronically ill appearing. multiple areas of eccymosis on Reece UE. Will do labs, ekg, cy head, ua, cardiac enzymes and observe. will likely need admit to hospital .MD Lester

## 2018-01-13 LAB
ALBUMIN SERPL ELPH-MCNC: 2.6 G/DL — LOW (ref 3.3–5)
ALP SERPL-CCNC: 68 U/L — SIGNIFICANT CHANGE UP (ref 40–120)
ALT FLD-CCNC: 16 U/L — SIGNIFICANT CHANGE UP (ref 12–78)
ANION GAP SERPL CALC-SCNC: 13 MMOL/L — SIGNIFICANT CHANGE UP (ref 5–17)
ANISOCYTOSIS BLD QL: SLIGHT — SIGNIFICANT CHANGE UP
APTT BLD: 30.8 SEC — SIGNIFICANT CHANGE UP (ref 27.5–37.4)
AST SERPL-CCNC: 38 U/L — HIGH (ref 15–37)
BASOPHILS # BLD AUTO: 0.1 K/UL — SIGNIFICANT CHANGE UP (ref 0–0.2)
BASOPHILS NFR BLD AUTO: 0.8 % — SIGNIFICANT CHANGE UP (ref 0–2)
BILIRUB SERPL-MCNC: 0.3 MG/DL — SIGNIFICANT CHANGE UP (ref 0.2–1.2)
BIZARRE PLATELETS BLD QL SMEAR: PRESENT — SIGNIFICANT CHANGE UP
BUN SERPL-MCNC: 56 MG/DL — HIGH (ref 7–23)
CALCIUM SERPL-MCNC: 8.6 MG/DL — SIGNIFICANT CHANGE UP (ref 8.5–10.1)
CHLORIDE SERPL-SCNC: 112 MMOL/L — HIGH (ref 96–108)
CO2 SERPL-SCNC: 18 MMOL/L — LOW (ref 22–31)
CREAT SERPL-MCNC: 1.59 MG/DL — HIGH (ref 0.5–1.3)
CULTURE RESULTS: NO GROWTH — SIGNIFICANT CHANGE UP
EOSINOPHIL # BLD AUTO: 0.2 K/UL — SIGNIFICANT CHANGE UP (ref 0–0.5)
EOSINOPHIL NFR BLD AUTO: 1.3 % — SIGNIFICANT CHANGE UP (ref 0–6)
GIANT PLATELETS BLD QL SMEAR: PRESENT — SIGNIFICANT CHANGE UP
GLUCOSE SERPL-MCNC: 60 MG/DL — LOW (ref 70–99)
HCT VFR BLD CALC: 30.9 % — LOW (ref 39–50)
HCT VFR BLD CALC: 32.6 % — LOW (ref 39–50)
HGB BLD-MCNC: 10.3 G/DL — LOW (ref 13–17)
HGB BLD-MCNC: 9.6 G/DL — LOW (ref 13–17)
INR BLD: 1.12 RATIO — SIGNIFICANT CHANGE UP (ref 0.88–1.16)
LG PLATELETS BLD QL AUTO: SIGNIFICANT CHANGE UP
LYMPHOCYTES # BLD AUTO: 0.4 K/UL — LOW (ref 1–3.3)
LYMPHOCYTES # BLD AUTO: 2.4 % — LOW (ref 13–44)
MACROCYTES BLD QL: SLIGHT — SIGNIFICANT CHANGE UP
MAGNESIUM SERPL-MCNC: 2.3 MG/DL — SIGNIFICANT CHANGE UP (ref 1.6–2.6)
MANUAL DIF COMMENT BLD-IMP: SIGNIFICANT CHANGE UP
MCHC RBC-ENTMCNC: 29.1 PG — SIGNIFICANT CHANGE UP (ref 27–34)
MCHC RBC-ENTMCNC: 31.7 GM/DL — LOW (ref 32–36)
MCV RBC AUTO: 92 FL — SIGNIFICANT CHANGE UP (ref 80–100)
MICROCYTES BLD QL: SLIGHT — SIGNIFICANT CHANGE UP
MONOCYTES # BLD AUTO: 0.7 K/UL — SIGNIFICANT CHANGE UP (ref 0–0.9)
MONOCYTES NFR BLD AUTO: 4 % — SIGNIFICANT CHANGE UP (ref 2–14)
NEUTROPHILS # BLD AUTO: 16.3 K/UL — HIGH (ref 1.8–7.4)
NEUTROPHILS NFR BLD AUTO: 91.5 % — HIGH (ref 43–77)
PHOSPHATE SERPL-MCNC: 4.4 MG/DL — SIGNIFICANT CHANGE UP (ref 2.5–4.5)
PLAT MORPH BLD: (no result)
PLATELET # BLD AUTO: 1064 K/UL — CRITICAL HIGH (ref 150–400)
PLATELET COUNT - ESTIMATE: (no result)
POIKILOCYTOSIS BLD QL AUTO: SLIGHT — SIGNIFICANT CHANGE UP
POLYCHROMASIA BLD QL SMEAR: SLIGHT — SIGNIFICANT CHANGE UP
POTASSIUM SERPL-MCNC: 4 MMOL/L — SIGNIFICANT CHANGE UP (ref 3.5–5.3)
POTASSIUM SERPL-SCNC: 4 MMOL/L — SIGNIFICANT CHANGE UP (ref 3.5–5.3)
PROT SERPL-MCNC: 6.1 GM/DL — SIGNIFICANT CHANGE UP (ref 6–8.3)
PROTHROM AB SERPL-ACNC: 12.1 SEC — SIGNIFICANT CHANGE UP (ref 9.8–12.7)
RBC # BLD: 3.54 M/UL — LOW (ref 4.2–5.8)
RBC # FLD: 19.8 % — HIGH (ref 10.3–14.5)
RBC BLD AUTO: (no result)
SODIUM SERPL-SCNC: 143 MMOL/L — SIGNIFICANT CHANGE UP (ref 135–145)
SPECIMEN SOURCE: SIGNIFICANT CHANGE UP
TROPONIN I SERPL-MCNC: 0.07 NG/ML — HIGH (ref 0.01–0.04)
TROPONIN I SERPL-MCNC: 0.08 NG/ML — HIGH (ref 0.01–0.04)
TSH SERPL-MCNC: 3.17 UU/ML — SIGNIFICANT CHANGE UP (ref 0.36–3.74)
VIT B12 SERPL-MCNC: 886 PG/ML — SIGNIFICANT CHANGE UP (ref 232–1245)
WBC # BLD: 17.8 K/UL — HIGH (ref 3.8–10.5)
WBC # FLD AUTO: 17.8 K/UL — HIGH (ref 3.8–10.5)

## 2018-01-13 PROCEDURE — 93010 ELECTROCARDIOGRAM REPORT: CPT

## 2018-01-13 PROCEDURE — 99223 1ST HOSP IP/OBS HIGH 75: CPT

## 2018-01-13 PROCEDURE — 93971 EXTREMITY STUDY: CPT | Mod: 26,LT

## 2018-01-13 PROCEDURE — 99222 1ST HOSP IP/OBS MODERATE 55: CPT

## 2018-01-13 PROCEDURE — 99232 SBSQ HOSP IP/OBS MODERATE 35: CPT

## 2018-01-13 RX ORDER — HYDROXYUREA 500 MG/1
1000 CAPSULE ORAL DAILY
Qty: 0 | Refills: 0 | Status: DISCONTINUED | OUTPATIENT
Start: 2018-01-13 | End: 2018-01-17

## 2018-01-13 RX ORDER — HEPARIN SODIUM 5000 [USP'U]/ML
INJECTION INTRAVENOUS; SUBCUTANEOUS
Qty: 25000 | Refills: 0 | Status: DISCONTINUED | OUTPATIENT
Start: 2018-01-13 | End: 2018-01-16

## 2018-01-13 RX ORDER — HEPARIN SODIUM 5000 [USP'U]/ML
5500 INJECTION INTRAVENOUS; SUBCUTANEOUS ONCE
Qty: 0 | Refills: 0 | Status: COMPLETED | OUTPATIENT
Start: 2018-01-13 | End: 2018-01-13

## 2018-01-13 RX ORDER — HEPARIN SODIUM 5000 [USP'U]/ML
2500 INJECTION INTRAVENOUS; SUBCUTANEOUS EVERY 6 HOURS
Qty: 0 | Refills: 0 | Status: DISCONTINUED | OUTPATIENT
Start: 2018-01-13 | End: 2018-01-16

## 2018-01-13 RX ORDER — HEPARIN SODIUM 5000 [USP'U]/ML
5500 INJECTION INTRAVENOUS; SUBCUTANEOUS EVERY 6 HOURS
Qty: 0 | Refills: 0 | Status: DISCONTINUED | OUTPATIENT
Start: 2018-01-13 | End: 2018-01-16

## 2018-01-13 RX ORDER — ASPIRIN/CALCIUM CARB/MAGNESIUM 324 MG
81 TABLET ORAL DAILY
Qty: 0 | Refills: 0 | Status: DISCONTINUED | OUTPATIENT
Start: 2018-01-13 | End: 2018-01-17

## 2018-01-13 RX ADMIN — Medication 100 MILLIGRAM(S): at 06:58

## 2018-01-13 RX ADMIN — CEFEPIME 100 MILLIGRAM(S): 1 INJECTION, POWDER, FOR SOLUTION INTRAMUSCULAR; INTRAVENOUS at 22:56

## 2018-01-13 RX ADMIN — HEPARIN SODIUM 1200 UNIT(S)/HR: 5000 INJECTION INTRAVENOUS; SUBCUTANEOUS at 18:12

## 2018-01-13 RX ADMIN — Medication 81 MILLIGRAM(S): at 12:25

## 2018-01-13 RX ADMIN — AMLODIPINE BESYLATE 10 MILLIGRAM(S): 2.5 TABLET ORAL at 06:58

## 2018-01-13 RX ADMIN — HEPARIN SODIUM 5500 UNIT(S): 5000 INJECTION INTRAVENOUS; SUBCUTANEOUS at 18:13

## 2018-01-13 RX ADMIN — HEPARIN SODIUM 5000 UNIT(S): 5000 INJECTION INTRAVENOUS; SUBCUTANEOUS at 06:58

## 2018-01-13 RX ADMIN — HYDROXYUREA 1000 MILLIGRAM(S): 500 CAPSULE ORAL at 19:05

## 2018-01-13 RX ADMIN — CEFEPIME 100 MILLIGRAM(S): 1 INJECTION, POWDER, FOR SOLUTION INTRAMUSCULAR; INTRAVENOUS at 00:36

## 2018-01-13 RX ADMIN — Medication 100 MILLIGRAM(S): at 22:56

## 2018-01-13 RX ADMIN — SODIUM CHLORIDE 75 MILLILITER(S): 9 INJECTION INTRAMUSCULAR; INTRAVENOUS; SUBCUTANEOUS at 01:27

## 2018-01-13 RX ADMIN — Medication 100 MILLIGRAM(S): at 15:37

## 2018-01-13 NOTE — CONSULT NOTE ADULT - SUBJECTIVE AND OBJECTIVE BOX
CC: 89y old  Male who presents with a chief complaint of CC: Brought in by ambulance secondary to recurrent falls (12 Jan 2018 21:42)      HPI:  History obtained by EMS and Niece as patient is a poor historian    89 year old male with past medical history of polycythemia, HTN, recently admitted for GIB (12/17), BIBA for recurrent falls. Patient was found at home crawling on the floor by [employer]. Patient was reportedly found to be crawling on the floor and had urinated on himself. Niece was informed who eventually called EMS services to evaluate patient. Patient was brought in by EMS to [facility] for further evaluation. Patient has no children and lives alone. His closest relative is his niece who lives in [place].    In the ED, patient was to have thrombocytosis and Leukocytosis. Patient was treated empirically with Vancomycin and Cefepime. (12 Jan 2018 21:42)      PAST MEDICAL & SURGICAL HISTORY:  Polycythemia  Tremor  HTN (hypertension)  History of back surgery      FAMILY HISTORY:  No pertinent family history in first degree relatives      Social Hx:  Nonsmoker, no drug or alcohol use    MEDICATIONS  (STANDING):  amLODIPine   Tablet 10 milliGRAM(s) Oral daily  cefepime  IVPB 500 milliGRAM(s) IV Intermittent every 24 hours  docusate sodium 100 milliGRAM(s) Oral three times a day  heparin  Injectable 5000 Unit(s) SubCutaneous every 12 hours  sodium chloride 0.9%. 1000 milliLiter(s) (75 mL/Hr) IV Continuous <Continuous>  sodium chloride 0.9%. 500 milliLiter(s) (250 mL/Hr) IV Continuous <Continuous>       Allergies    No Known Allergies    Intolerances        ROS: Pertinent positives in HPI, all other ROS were reviewed and are negative.      Vital Signs Last 24 Hrs  T(C): 36.6 (13 Jan 2018 05:08), Max: 37.3 (12 Jan 2018 18:47)  T(F): 97.9 (13 Jan 2018 05:08), Max: 99.1 (12 Jan 2018 18:47)  HR: 81 (13 Jan 2018 05:08) (72 - 88)  BP: 128/63 (13 Jan 2018 05:08) (112/62 - 150/87)  BP(mean): --  RR: 17 (13 Jan 2018 01:17) (16 - 20)  SpO2: 99% (13 Jan 2018 05:08) (98% - 100%)        Constitutional: awake and alert.  HEENT: PERRLA, EOMI,   Neck: Supple.  Respiratory: Breath sounds are clear bilaterally  Cardiovascular: S1 and S2, regular / irregular rhythm  Gastrointestinal: soft, nontender  Extremities:  no edema  Vascular: Caritid Bruit - no  Musculoskeletal: no joint swelling/tenderness, no abnormal movements  Skin: No rashes    Neurological exam:  HF: A x O x 3. Appropriately interactive, normal affect. Speech fluent, No Aphasia or paraphasic errors. Naming /repetition intact   CN: MITA, EOMI, VFF, facial sensation normal, no NLFD, tongue midline, Palate moves equally, SCM equal bilaterally  Motor: No pronator drift, Strength 5/5 in all 4 ext, normal bulk and tone, no tremor, rigidity or bradykinesia.    Sens: Intact to light touch / PP/ VS/ JS    Reflexes: Symmetric and normal . BJ 2+, BR 2+, KJ 2+, AJ 2+, downgoing toes b/l  Coord:  No FNFA, dysmetria, PAOLA intact   Gait/Balance: Normal/Cannot test    NIHSS:      Labs:   01-13    143  |  112<H>  |  56<H>  ----------------------------<  60<L>  4.0   |  18<L>  |  1.59<H>    Ca    8.6      13 Jan 2018 05:29  Phos  4.4     01-13  Mg     2.3     01-13    TPro  6.1  /  Alb  2.6<L>  /  TBili  0.3  /  DBili  x   /  AST  38<H>  /  ALT  16  /  AlkPhos  68  01-13                        10.3   17.8  )-----------( 1064     ( 13 Jan 2018 05:29 )             32.6       Radiology:  - CT Head:   < from: CT Head No Cont (01.12.18 @ 19:29) >  Age related involutional and microvascular ischemic changes, without   evidence of intracranial hemorrhage, mass effect or midline shift. CC: 89 y old male brought in by ambulance secondary to recurrent falls/ found on floor (12 Jan 2018 21:42)    HPI:  89 year old male with past medical history of polycythemia, HTN, recently admitted for GIB (12/17), was BIBA for recurrent falls; pt was found at home crawling on the floor by [employer], he had urinated on himself. History obtained by EMS and from Niece by ED/hospitalist as patient is a poor historian; niece was informed who eventually called EMS services, they brought him to [facility], patient has no children and lives alone, his closest relative is his niece who lives in [place]. In the ED, patient was to noted to have thrombocytosis and Leukocytosis, was treated empirically with Vancomycin and Cefepime. CTH showed no acute findings.    Upon interveiwing the pt. today, pt provides history but is confused, states he lives alone and manages his life, he well oriented to self/place but is confused about yesterdays events; recalls going shopping only, then sttaes he was taken to [facility] recently; not making perfect sense. C/O left knee pain      PAST MEDICAL & SURGICAL HISTORY:  Polycythemia  Tremor  HTN (hypertension)  History of back surgery      FAMILY HISTORY:  unable to obtain    Social Hx:  lives alone, Nonsmoker, no drug or alcohol use    MEDICATIONS  (STANDING):  amLODIPine   Tablet 10 milliGRAM(s) Oral daily  cefepime  IVPB 500 milliGRAM(s) IV Intermittent every 24 hours  docusate sodium 100 milliGRAM(s) Oral three times a day  heparin  Injectable 5000 Unit(s) SubCutaneous every 12 hours  sodium chloride 0.9%. 1000 milliLiter(s) (75 mL/Hr) IV Continuous <Continuous>  sodium chloride 0.9%. 500 milliLiter(s) (250 mL/Hr) IV Continuous <Continuous>       Allergies  No Known Allergies  Intolerances    ROS: Pertinent positives in HPI, all other ROS were reviewed and are negative.      Vital Signs Last 24 Hrs  T(C): 36.6 (13 Jan 2018 05:08), Max: 37.3 (12 Jan 2018 18:47)  T(F): 97.9 (13 Jan 2018 05:08), Max: 99.1 (12 Jan 2018 18:47)  HR: 81 (13 Jan 2018 05:08) (72 - 88)  BP: 128/63 (13 Jan 2018 05:08) (112/62 - 150/87)  BP(mean): --  RR: 17 (13 Jan 2018 01:17) (16 - 20)  SpO2: 99% (13 Jan 2018 05:08) (98% - 100%)    GE:  Constitutional: awake and alert.  HEENT: PERRLA, EOMI,   Neck: Supple.  Respiratory: Breath sounds are clear bilaterally  Cardiovascular: S1 and S2, regular rhythm  Extremities:  no edema  Vascular: Caritid Bruit - no  Musculoskeletal: painful left knee movements  Skin: No rashes    Neurological exam:  HF: A x O x 3-. Appropriately interactive, Speech fluent, No Aphasia. Naming /repetition intact; decreased concentration/recall and attention   CN: MITA, EOMI, VFF, facial sensation normal, slight left NLFD, tongue midline, Palate moves equally, SCM equal bilaterally  Motor: No pronator drift, Strength LUE prox 4/5, other muscle groups antigravity, normal bulk and tone, no tremor, rigidity.    Sens: Intact to light touch     Reflexes: 1+ AJ 2+, downgoing toes b/l  Coord:  No FNFA,    Gait/Balance: Not tested    Labs:   01-13    143  |  112<H>  |  56<H>  ----------------------------<  60<L>  4.0   |  18<L>  |  1.59<H>    Ca    8.6      13 Jan 2018 05:29  Phos  4.4     01-13  Mg     2.3     01-13    TPro  6.1  /  Alb  2.6<L>  /  TBili  0.3  /  DBili  x   /  AST  38<H>  /  ALT  16  /  AlkPhos  68  01-13                        10.3   17.8  )-----------( 1064     ( 13 Jan 2018 05:29 )             32.6     Radiology:  - CT Head:   < from: CT Head No Cont (01.12.18 @ 19:29) >  Age related involutional and microvascular ischemic changes, without   evidence of intracranial hemorrhage, mass effect or midline shift.

## 2018-01-13 NOTE — CONSULT NOTE ADULT - SUBJECTIVE AND OBJECTIVE BOX
HPI for ED:  History obtained by EMS and Niece    89 year old male with past medical history of polycythemia, HTN, recently admitted for GIB (12/17), BIBA for recurrent falls. Patient was found at home crawling on the floor by [employer]. Patient was reportedly found to be crawling on the floor and had urinated on himself. Niece was informed who eventually called EMS services to evaluate patient. Patient was brought in by EMS to [facility] for further evaluation. Patient has no children and lives alone. His closest relative is his niece who lives in [place].    In the ED, patient was to have thrombocytosis and Leukocytosis. Patient was treated empirically with Vancomycin and Cefepime.     Patient is well known to our practice. 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 He was on Hydrea - variable dose 3 or 2 tablets daily. He followed in our office for count check and Hydrea monitoring - baseline WBC ~ 10 , platelets maintained ~ 500 K, Hgb ~ 12.    In December he was admitted with GIB- had pancytopenia and Hydrea was held.  After discharge he had CBC  by home draw ( [place] lab). On Dec 29  plts were 515 K, WBC 7.9  Hgb 10.7.  On 1/8  WBC was 13 Hgb 11.0 and platelets 1094 K.   I spoke with patient and asked him to take Hydrea 2 tablets daily ( he started on Tuesday).     He appears somewhat confused- not his usual self but he remembers that he was supposed to take 2 tablets of Hydrea daily.    PAST MEDICAL & SURGICAL HISTORY:  Polycythemia  Tremor  HTN (hypertension)  History of back surgery/ spinal stenosis  Hip replacement  GIB     Social Hx :  for many years. No children. Lives alone. Only relative- niece- in [place].    Family hx : NC.    ROS : limited as he appears confused, denies any complaints now    Vital Signs Last 24 Hrs  T(C): 36.4 (13 Jan 2018 10:59), Max: 37.3 (12 Jan 2018 18:47)  T(F): 97.6 (13 Jan 2018 10:59), Max: 99.1 (12 Jan 2018 18:47)  HR: 82 (13 Jan 2018 10:59) (72 - 88)  BP: 114/53 (13 Jan 2018 10:59) (112/62 - 150/87)  BP(mean): --  RR: 17 (13 Jan 2018 10:59) (16 - 20)  SpO2: 100% (13 Jan 2018 10:59) (98% - 100%)      Awake alert but somewhat confused- this is not his baseline   Ecchymoses extremities. Chest clear. Abdomen soft, no splenomegaly. Trace ankle edema.                          10.3   17.8  )-----------( 1064     ( 13 Jan 2018 05:29 )             32.6     01-13    143  |  112<H>  |  56<H>  ----------------------------<  60<L>  4.0   |  18<L>  |  1.59<H>    Ca    8.6      13 Jan 2018 05:29  Phos  4.4     01-13  Mg     2.3     01-13    TPro  6.1  /  Alb  2.6<L>  /  TBili  0.3  /  DBili  x   /  AST  38<H>  /  ALT  16  /  AlkPhos  68  01-13      < from: Xray Chest 1 View AP- PORTABLE-Urgent (01.12.18 @ 19:41) >    EXAM:  XR CHEST PORTABLE URGENT 1V                            PROCEDURE DATE:  01/12/2018          INTERPRETATION:  Exam Date: 1/12/2018 7:41 PM    History: Fall    Technique: Single frontal portable view of the chest with comparison to    12/19/2017    Findings:    The heart is enlarged.  The lungs are grossly clear. The apices and   hemidiaphragms are unremarkable. Degenerative changes of the visualized   osseous structures. Prior bilateral humeral heads replacements.    Thoracolumbar scoliosis convex to the right.     Impression:    No acute disease    No significant interval change as compared to  12/19/2017      JACK JADE M.D., ATTENDING RADIOLOGIST  This document has been electronically signed. Jan 13 2018  8:28AM      MEDICATIONS  (STANDING):  amLODIPine   Tablet 10 milliGRAM(s) Oral daily  aspirin enteric coated 81 milliGRAM(s) Oral daily  cefepime  IVPB 500 milliGRAM(s) IV Intermittent every 24 hours  docusate sodium 100 milliGRAM(s) Oral three times a day  heparin  Injectable 5000 Unit(s) SubCutaneous every 12 hours  sodium chloride 0.9%. 1000 milliLiter(s) (75 mL/Hr) IV Continuous <Continuous>  sodium chloride 0.9%. 500 milliLiter(s) (250 mL/Hr) IV Continuous <Continuous>    MEDICATIONS  (PRN):  acetaminophen   Tablet. 650 milliGRAM(s) Oral every 6 hours PRN Mild Pain (1 - 3)  ondansetron Injectable 4 milliGRAM(s) IV Push every 6 hours PRN Nausea

## 2018-01-13 NOTE — CONSULT NOTE ADULT - SUBJECTIVE AND OBJECTIVE BOX
CHIEF COMPLAINT:    HPI:  History obtained by EMS and Niece as patient is a poor historian    89 year old male with past medical history of polycythemia, HTN, recently admitted for GIB (), BIBA for recurrent fall?   . Patient was found at home crawling on the floor by [employer]. Patient was reportedly found to be crawling on the floor and had urinated on himself. Niece was informed who eventually called EMS services to evaluate patient. Patient was brought in by EMS to [facility] for further evaluation. Patient has no children and lives alone. His closest relative is his niece who lives in [place]. Patient denies any chest pain or shortness of breath , does not remember all details , patient was having left knee pain who was scheduled to have procedure his left knee     In the ED, patient was to have thrombocytosis and Leukocytosis. Patient was treated empirically with Vancomycin and Cefepime. (2018 21:42)  his blood work showed borderline elevated troponin ,warranted  consult , patient had prior abnormal ekg       PAST MEDICAL & SURGICAL HISTORY:  Polycythemia  Tremor  HTN (hypertension)  History of back surgery      Allergies    No Known Allergies    Intolerances        SOCIAL HISTORY:  non smoker     FAMILY HISTORY:  No pertinent family history in first degree relatives      MEDICATIONS:  MEDICATIONS  (STANDING):  amLODIPine   Tablet 10 milliGRAM(s) Oral daily  cefepime  IVPB 500 milliGRAM(s) IV Intermittent every 24 hours  docusate sodium 100 milliGRAM(s) Oral three times a day  heparin  Injectable 5000 Unit(s) SubCutaneous every 12 hours  sodium chloride 0.9%. 1000 milliLiter(s) (75 mL/Hr) IV Continuous <Continuous>  sodium chloride 0.9%. 500 milliLiter(s) (250 mL/Hr) IV Continuous <Continuous>    MEDICATIONS  (PRN):  acetaminophen   Tablet. 650 milliGRAM(s) Oral every 6 hours PRN Mild Pain (1 - 3)  ondansetron Injectable 4 milliGRAM(s) IV Push every 6 hours PRN Nausea      REVIEW OF SYSTEMS:    patient is forgetful   All other review of systems is negative unless indicated above    Vital Signs Last 24 Hrs  T(C): 36.6 (2018 05:08), Max: 37.3 (2018 18:47)  T(F): 97.9 (2018 05:08), Max: 99.1 (2018 18:47)  HR: 81 (2018 05:08) (72 - 88)  BP: 128/63 (2018 05:08) (112/62 - 150/87)  BP(mean): --  RR: 17 (2018 01:17) (16 - 20)  SpO2: 99% (2018 05:08) (98% - 100%)    I&O's Summary      PHYSICAL EXAM:    Constitutional: NAD, awake and alert, well-developed  HEENT: PERR, EOMI,  No oral cyananosis.  Neck:  supple,  No JVD  Respiratory: Breath sounds are clear bilaterally, No wheezing, rales or rhonchi  Cardiovascular: S1 and S2, regular rate and rhythm,  Gastrointestinal: Bowel Sounds present, soft, nontender.   Extremities: No peripheral edema. No clubbing or cyanosis.  Vascular: 2+ peripheral pulses  Neurological: A/O x 3,unsteady gait  due to arthritisi left knee pain   Musculoskeletal: no calf tenderness.  Skin: No rashes.      LABS: All Labs Reviewed:                        10.3   17.8  )-----------( 1064     ( 2018 05:29 )             32.6                         11.4   23.0  )-----------( 1216     ( 2018 21:29 )             36.5                         11.9   25.4  )-----------( 1315     ( 2018 19:00 )             37.6     2018 05:29    143    |  112    |  56     ----------------------------<  60     4.0     |  18     |  1.59   2018 19:00    143    |  111    |  59     ----------------------------<  82     5.0     |  19     |  1.83     Ca    8.6        2018 05:29  Ca    9.5        2018 19:00  Phos  4.4       2018 05:29  Mg     2.3       2018 05:29    TPro  6.1    /  Alb  2.6    /  TBili  0.3    /  DBili  x      /  AST  38     /  ALT  16     /  AlkPhos  68     2018 05:29  TPro  7.2    /  Alb  3.0    /  TBili  0.3    /  DBili  x      /  AST  48     /  ALT  20     /  AlkPhos  82     2018 19:00    PT/INR - ( 2018 19:00 )   PT: 12.6 sec;   INR: 1.16 ratio         PTT - ( 2018 19:00 )  PTT:33.2 sec  CARDIAC MARKERS ( 2018 05:29 )  0.074 ng/mL / x     / x     / x     / x      CARDIAC MARKERS ( 2018 01:28 )  0.077 ng/mL / x     / x     / x     / x      CARDIAC MARKERS ( 2018 19:00 )  0.062 ng/mL / x     / 949 U/L / x     / x          Blood Culture:      @ 05:29  TSH: 3.170      RADIOLOGY/EK/12/18 Normal sinus rhythm left axis , RBBB    no significant change from prior EKG  echo moderate LVH , moderate MR , midl AI ,  normal EF

## 2018-01-13 NOTE — PATIENT PROFILE ADULT. - TEACHING/LEARNING LEARNING PREFERENCES
verbal instruction/individual instruction/video/written material/audio/group instruction/pictorial/skill demonstration

## 2018-01-13 NOTE — PROGRESS NOTE ADULT - SUBJECTIVE AND OBJECTIVE BOX
CHIEF COMPLAINT: Fall    SUBJECTIVE: History obtained by EMS and Niece as patient is a poor historian    89 year old male with past medical history of polycythemia, HTN, recently admitted for GIB (12/17), BIBA for recurrent falls. Patient was found at home crawling on the floor by [employer]. Patient was reportedly found to be crawling on the floor and had urinated on himself. Niece was informed who eventually called EMS services to evaluate patient. Patient was brought in by EMS to [facility] for further evaluation. Patient has no children and lives alone. His closest relative is his niece who lives in [place].    In the ED, patient was to have thrombocytosis and Leukocytosis. Patient was treated empirically with Vancomycin and Cefepime.     1/13/18 - Patient seen and examined at bedside. Patient has tangential thought, no complaints. Patient cannot give history or proper Review of Systems. AAOX2, oriented to person and place, but his answers to questions are inappropriate. Patient noticed to have LLE swelling/erythema, and when prompted admitted pain. Doppler US studies resulted a DVT. Recent GI Bleed, and PMHx of Thrombocytosis makes treatment options risky. Case discussed with hem/onc, no contraindications due to thrombocytosis, but referred to call GI. GI did not do intervention for GI Bleed last time, (no Endoscopy) due to high risk patient status. Decision will lie in HCP's hands. Patient HCP Derrick and Wendie were called, no answer, but they were in the hospital. Case discussed in depth, risks and benefits, considering the history, of giving Heparin treatment, which may cause bleeds, versus no treatment which may result in a pulmonary embolism. HCP decided on Hep gtt. APTT Ordered and Hep gtt started. Patient also started on Hydroxyurea, and [id number]. Will d/c [id number] for now due to hep gtt. Neurology consult appreciated, EEG, f/u b12/folate for AMS.    REVIEW OF SYSTEMS:  Unable to Assess      Vital Signs Last 24 Hrs  T(C): 36.4 (13 Jan 2018 10:59), Max: 37.3 (12 Jan 2018 18:47)  T(F): 97.6 (13 Jan 2018 10:59), Max: 99.1 (12 Jan 2018 18:47)  HR: 82 (13 Jan 2018 10:59) (72 - 88)  BP: 114/53 (13 Jan 2018 10:59) (112/62 - 150/87)  BP(mean): --  RR: 17 (13 Jan 2018 10:59) (16 - 20)  SpO2: 100% (13 Jan 2018 10:59) (98% - 100%)  I&O's Summary      CAPILLARY BLOOD GLUCOSE          PHYSICAL EXAM:    Constitutional: NAD, awake and alert, well-developed  HEENT: PERR, EOMI, Normal Hearing, MMM  Neck: Soft and supple, No LAD, No JVD  Respiratory: Breath sounds are clear bilaterally, No wheezing, rales or rhonchi  Cardiovascular: S1 and S2, regular rate and rhythm, no Murmurs, gallops or rubs  Gastrointestinal: Bowel Sounds present, soft, nontender, nondistended, no guarding, no rebound  Extremities: LLE warmth, swelling, erythema, shiny/taut skin over anterior calf. No TTP.  Vascular: 2+ peripheral pulses  Neurological: A/O x 3, no focal deficits  Musculoskeletal: 5/5 strength b/l upper and lower extremities  Skin: No rashes    MEDICATIONS  (STANDING):  amLODIPine   Tablet 10 milliGRAM(s) Oral daily  aspirin enteric coated 81 milliGRAM(s) Oral daily  cefepime  IVPB 500 milliGRAM(s) IV Intermittent every 24 hours  docusate sodium 100 milliGRAM(s) Oral three times a day  heparin  Infusion.  Unit(s)/Hr (12 mL/Hr) IV Continuous <Continuous>  heparin  Injectable 5500 Unit(s) IV Push once  hydroxyurea 1000 milliGRAM(s) Oral daily  sodium chloride 0.9%. 1000 milliLiter(s) (75 mL/Hr) IV Continuous <Continuous>  sodium chloride 0.9%. 500 milliLiter(s) (250 mL/Hr) IV Continuous <Continuous>    MEDICATIONS  (PRN):  acetaminophen   Tablet. 650 milliGRAM(s) Oral every 6 hours PRN Mild Pain (1 - 3)  heparin  Injectable 5500 Unit(s) IV Push every 6 hours PRN For aPTT less than 40  heparin  Injectable 2500 Unit(s) IV Push every 6 hours PRN For aPTT between 40 - 57  ondansetron Injectable 4 milliGRAM(s) IV Push every 6 hours PRN Nausea      LABS: All Labs Reviewed:                        10.3   17.8  )-----------( 1064     ( 13 Jan 2018 05:29 )             32.6     01-13    143  |  112<H>  |  56<H>  ----------------------------<  60<L>  4.0   |  18<L>  |  1.59<H>    Ca    8.6      13 Jan 2018 05:29  Phos  4.4     01-13  Mg     2.3     01-13    TPro  6.1  /  Alb  2.6<L>  /  TBili  0.3  /  DBili  x   /  AST  38<H>  /  ALT  16  /  AlkPhos  68  01-13    PT/INR - ( 13 Jan 2018 16:55 )   PT: 12.1 sec;   INR: 1.12 ratio         PTT - ( 13 Jan 2018 16:55 )  PTT:30.8 sec  CARDIAC MARKERS ( 13 Jan 2018 05:29 )  0.074 ng/mL / x     / x     / x     / x      CARDIAC MARKERS ( 13 Jan 2018 01:28 )  0.077 ng/mL / x     / x     / x     / x      CARDIAC MARKERS ( 12 Jan 2018 19:00 )  0.062 ng/mL / x     / 949 U/L / x     / x          Blood Culture:     RADIOLOGY/EKG:    DVT PPX:    ADVANCED DIRECTIVE:    DISPOSITION:

## 2018-01-13 NOTE — CONSULT NOTE ADULT - ASSESSMENT
89 year old elderly male with hypertension ,hypertensive heart disease , osteoarthritis ,     who apparently  crawling on floor ? fall vs due left knee pain , doubt syncope , dehydration ,      recommend ortho evaluation , physical therapy     Elevated troponin   borderline , possibly due to demand ischemia  in setting of left ventricular hypertrophy ,elevated bun and creatinine ,  ecotrin ,  medical management      stable abnormal EKG     HTN hypertensive heart disease : controlled , continue current medication ,    Valvular disease   moderate MR  Mild AI , monitor 89 year old elderly male with hypertension ,hypertensive heart disease , osteoarthritis ,     who apparently  crawling on floor ? fall vs due left knee pain , doubt syncope , dehydration ,      recommend ortho evaluation , physical therapy     Elevated troponin   borderline , possibly due to demand ischemia  in setting of left ventricular hypertrophy ,elevated bun and creatinine , ? sepsis ,  will give  ecotrin ,  medical management    follow up of cultures     stable abnormal EKG     HTN hypertensive heart disease : controlled , continue current medication ,    Valvular disease   moderate MR  Mild AI , monitor

## 2018-01-13 NOTE — CONSULT NOTE ADULT - ASSESSMENT
89 year old elderly male with hypertension ,hypertensive heart disease , osteoarthritis , brought in as he was apparently found crawling on floor by wheels on meals; pt has poor recollection of the event.    At present he is well oriented to time/place, but is confused about recent past events.    # Encephalopathy/ syncope / fall; could be dehydration , orthostatic hypotension; angel out arrythmia    # Fall /confusion/ urinary incontinence - unwitnessed; seizure is a remote possibility    - Obtain B12/fol  - EEG  - physical therapy   - sepsis w/u    D/W pt and Dr. Palla

## 2018-01-13 NOTE — PROGRESS NOTE ADULT - ATTENDING COMMENTS
Patient seen and examined with Jose Friedman and Edward Kayserian on the Family Medicine Teaching Service.  Agree with history, physical, labs and plan which were reviewed in detail.

## 2018-01-13 NOTE — CONSULT NOTE ADULT - ASSESSMENT
89 y/o male with long history of Myeloproliferative disorder, on Hydrea.  In December Hydrea held due to pancytopenia in setting of GIB.  Now platelets rising again ( in the past his platelets raised to over one million when he run out of Hydrea ).   He restarted Hydrea on Jan 9 ( at home,  prior to this admission).    Continue Hydrea 2 tablets ( 1000 mg daily)  Continue  low dose ASA.   Leukocytosis- probably a combination of MPD, stress leukocytosis/ dehydration/ ? infection. . Empiric antibiotics until cultures neg ( although no clinical symptoms ).   Confusion- probably metabolic encephalopathy.    Needs social service evaluation-  up to about one year ago he was able to live independently with practically no community support. He  was always well groomed and he was able to drive  for his appointments.   This is no longer possible.

## 2018-01-14 LAB
ANION GAP SERPL CALC-SCNC: 9 MMOL/L — SIGNIFICANT CHANGE UP (ref 5–17)
APTT BLD: 147.5 SEC — CRITICAL HIGH (ref 27.5–37.4)
APTT BLD: 58 SEC — HIGH (ref 27.5–37.4)
APTT BLD: 71.3 SEC — HIGH (ref 27.5–37.4)
BUN SERPL-MCNC: 51 MG/DL — HIGH (ref 7–23)
CALCIUM SERPL-MCNC: 8.5 MG/DL — SIGNIFICANT CHANGE UP (ref 8.5–10.1)
CHLORIDE SERPL-SCNC: 112 MMOL/L — HIGH (ref 96–108)
CO2 SERPL-SCNC: 22 MMOL/L — SIGNIFICANT CHANGE UP (ref 22–31)
CREAT SERPL-MCNC: 1.51 MG/DL — HIGH (ref 0.5–1.3)
GLUCOSE SERPL-MCNC: 91 MG/DL — SIGNIFICANT CHANGE UP (ref 70–99)
HCT VFR BLD CALC: 28.2 % — LOW (ref 39–50)
HCT VFR BLD CALC: 32.2 % — LOW (ref 39–50)
HGB BLD-MCNC: 10 G/DL — LOW (ref 13–17)
HGB BLD-MCNC: 9.4 G/DL — LOW (ref 13–17)
INR BLD: 1.12 RATIO — SIGNIFICANT CHANGE UP (ref 0.88–1.16)
MCHC RBC-ENTMCNC: 28.6 PG — SIGNIFICANT CHANGE UP (ref 27–34)
MCHC RBC-ENTMCNC: 29.7 PG — SIGNIFICANT CHANGE UP (ref 27–34)
MCHC RBC-ENTMCNC: 31 GM/DL — LOW (ref 32–36)
MCHC RBC-ENTMCNC: 33.3 GM/DL — SIGNIFICANT CHANGE UP (ref 32–36)
MCV RBC AUTO: 89.3 FL — SIGNIFICANT CHANGE UP (ref 80–100)
MCV RBC AUTO: 92.4 FL — SIGNIFICANT CHANGE UP (ref 80–100)
PLATELET # BLD AUTO: 1025 K/UL — CRITICAL HIGH (ref 150–400)
PLATELET # BLD AUTO: 957 K/UL — HIGH (ref 150–400)
POTASSIUM SERPL-MCNC: 3.9 MMOL/L — SIGNIFICANT CHANGE UP (ref 3.5–5.3)
POTASSIUM SERPL-SCNC: 3.9 MMOL/L — SIGNIFICANT CHANGE UP (ref 3.5–5.3)
PROTHROM AB SERPL-ACNC: 12.1 SEC — SIGNIFICANT CHANGE UP (ref 9.8–12.7)
RBC # BLD: 3.16 M/UL — LOW (ref 4.2–5.8)
RBC # BLD: 3.49 M/UL — LOW (ref 4.2–5.8)
RBC # FLD: 19.6 % — HIGH (ref 10.3–14.5)
RBC # FLD: 20 % — HIGH (ref 10.3–14.5)
SODIUM SERPL-SCNC: 143 MMOL/L — SIGNIFICANT CHANGE UP (ref 135–145)
WBC # BLD: 14.4 K/UL — HIGH (ref 3.8–10.5)
WBC # BLD: 15.4 K/UL — HIGH (ref 3.8–10.5)
WBC # FLD AUTO: 14.4 K/UL — HIGH (ref 3.8–10.5)
WBC # FLD AUTO: 15.4 K/UL — HIGH (ref 3.8–10.5)

## 2018-01-14 PROCEDURE — 99233 SBSQ HOSP IP/OBS HIGH 50: CPT

## 2018-01-14 PROCEDURE — 93010 ELECTROCARDIOGRAM REPORT: CPT

## 2018-01-14 PROCEDURE — 99232 SBSQ HOSP IP/OBS MODERATE 35: CPT

## 2018-01-14 PROCEDURE — 99231 SBSQ HOSP IP/OBS SF/LOW 25: CPT

## 2018-01-14 PROCEDURE — 95816 EEG AWAKE AND DROWSY: CPT | Mod: 26

## 2018-01-14 RX ORDER — SODIUM CHLORIDE 9 MG/ML
1000 INJECTION, SOLUTION INTRAVENOUS
Qty: 0 | Refills: 0 | Status: DISCONTINUED | OUTPATIENT
Start: 2018-01-14 | End: 2018-01-17

## 2018-01-14 RX ADMIN — HEPARIN SODIUM 0 UNIT(S)/HR: 5000 INJECTION INTRAVENOUS; SUBCUTANEOUS at 01:07

## 2018-01-14 RX ADMIN — Medication 650 MILLIGRAM(S): at 21:45

## 2018-01-14 RX ADMIN — HYDROXYUREA 1000 MILLIGRAM(S): 500 CAPSULE ORAL at 17:26

## 2018-01-14 RX ADMIN — HEPARIN SODIUM 1000 UNIT(S)/HR: 5000 INJECTION INTRAVENOUS; SUBCUTANEOUS at 10:41

## 2018-01-14 RX ADMIN — Medication 100 MILLIGRAM(S): at 12:57

## 2018-01-14 RX ADMIN — Medication 81 MILLIGRAM(S): at 12:50

## 2018-01-14 RX ADMIN — HEPARIN SODIUM 1000 UNIT(S)/HR: 5000 INJECTION INTRAVENOUS; SUBCUTANEOUS at 02:09

## 2018-01-14 RX ADMIN — Medication 100 MILLIGRAM(S): at 21:45

## 2018-01-14 RX ADMIN — SODIUM CHLORIDE 75 MILLILITER(S): 9 INJECTION, SOLUTION INTRAVENOUS at 19:01

## 2018-01-14 RX ADMIN — Medication 650 MILLIGRAM(S): at 22:30

## 2018-01-14 RX ADMIN — Medication 100 MILLIGRAM(S): at 05:46

## 2018-01-14 RX ADMIN — AMLODIPINE BESYLATE 10 MILLIGRAM(S): 2.5 TABLET ORAL at 05:46

## 2018-01-14 NOTE — PROGRESS NOTE ADULT - SUBJECTIVE AND OBJECTIVE BOX
S&O:  Patient is intermittently confused , talks irrelevant, is forgetful, no aphasia.  No seizure like activity.  B12/TSH nl  EEG - pending      ROS: Pertinent positives in HPI, all other ROS were reviewed and are negative.      MEDICATIONS  (STANDING):  amLODIPine   Tablet 10 milliGRAM(s) Oral daily  aspirin enteric coated 81 milliGRAM(s) Oral daily  cefepime  IVPB 500 milliGRAM(s) IV Intermittent every 24 hours  docusate sodium 100 milliGRAM(s) Oral three times a day  heparin  Infusion.  Unit(s)/Hr (12 mL/Hr) IV Continuous <Continuous>  hydroxyurea 1000 milliGRAM(s) Oral daily  sodium chloride 0.45%. 1000 milliLiter(s) (75 mL/Hr) IV Continuous <Continuous>  sodium chloride 0.9%. 1000 milliLiter(s) (75 mL/Hr) IV Continuous <Continuous>  sodium chloride 0.9%. 500 milliLiter(s) (250 mL/Hr) IV Continuous <Continuous>      Vital Signs Last 24 Hrs  T(C): 36.6 (14 Jan 2018 08:18), Max: 37.1 (13 Jan 2018 18:05)  T(F): 97.9 (14 Jan 2018 08:18), Max: 98.7 (13 Jan 2018 18:05)  HR: 75 (14 Jan 2018 08:18) (74 - 99)  BP: 129/70 (14 Jan 2018 08:18) (118/27 - 133/61)  BP(mean): --  RR: 18 (14 Jan 2018 08:18) (18 - 18)  SpO2: 99% (14 Jan 2018 08:18) (92% - 99%)     GE:  Constitutional: awake and alert.  HEENT: PERRLA, EOMI,   Neck: Supple.  Respiratory: Breath sounds are clear bilaterally  Cardiovascular: S1 and S2, regular rhythm  Extremities:  no edema  Vascular: Caritid Bruit - no  Musculoskeletal: painful left knee movements  Skin: No rashes    Neurological exam:  HF: A x O x 3-. Appropriately interactive, Speech fluent, No Aphasia. Naming /repetition intact; decreased concentration/recall and attention   CN: MITA, EOMI, VFF, facial sensation normal, slight left NLFD, tongue midline, Palate moves equally, SCM equal bilaterally  Motor: No pronator drift, Strength LUE prox 4/5, other muscle groups antigravity, normal bulk and tone, no tremor, rigidity.    Sens: Intact to light touch     Reflexes: 1+ AJ 2+, downgoing toes b/l  Coord:  No FNFA,    Gait/Balance: Not tested                       10.0   15.4  )-----------( 1025     ( 14 Jan 2018 04:55 )             32.2     01-14    143  |  112<H>  |  51<H>  ----------------------------<  91  3.9   |  22  |  1.51<H>    Ca    8.5      14 Jan 2018 04:55  Phos  4.4     01-13  Mg     2.3     01-13    TPro  6.1  /  Alb  2.6<L>  /  TBili  0.3  /  DBili  x   /  AST  38<H>  /  ALT  16  /  AlkPhos  68  01-13

## 2018-01-14 NOTE — PROGRESS NOTE ADULT - ATTENDING COMMENTS
Patient seen and examined with Jose Wing and Ced Webster on the Family Medicine Teaching Service.  Agree with history, physical, labs and plan which were reviewed in detail.

## 2018-01-14 NOTE — PROGRESS NOTE ADULT - SUBJECTIVE AND OBJECTIVE BOX
CHIEF COMPLAINT: Fall    SUBJECTIVE:   History obtained by EMS and Niece as patient is a poor historian  89 year old male with past medical history of polycythemia, HTN, recently admitted for GIB (), BIBA for recurrent falls. Patient was found at home crawling on the floor by [employer]. Patient also had urinated on himself. Niece was informed who eventually called EMS services to evaluate patient. Patient was brought in by EMS to [facility] for further evaluation. Patient has no children and lives alone. His closest relative is his niece who lives in [place].  In the ED, patient was to have thrombocytosis and Leukocytosis. Patient was treated empirically with Vancomycin and Cefepime.     18 - Patient seen and examined at bedside. Patient has tangential thought, no complaints. Patient cannot give history or proper Review of Systems. AAOX2, oriented to person and place, but his answers to questions are inappropriate. Patient noticed to have LLE swelling/erythema, and when prompted admitted pain. Doppler US studies resulted a DVT. Recent GI Bleed, and PMHx of Thrombocytosis makes treatment options risky. Case discussed with hem/onc, no contraindications due to thrombocytosis, but referred to call GI. GI did not do intervention for GI Bleed last time, (no Endoscopy) due to high risk patient status. Decision will lie in HCP's hands. Patient HCP Derrick and Wendie were called, no answer, but they were in the hospital. Case discussed in depth, risks and benefits, considering the history, of giving Heparin treatment, which may cause bleeds, versus no treatment which may result in a pulmonary embolism. HCP decided on Hep gtt. APTT Ordered and Hep gtt started. Patient also started on Hydroxyurea, and [id number]. Will d/c [id number] for now due to hep gtt. Neurology consult appreciated, EEG, f/u b12/folate for AMS.    (18) Patient seen and examined at bedside. Patient continues to have tangential thinking with periods of clarity. Patient tolerating Heparin gtt well without acute signs of bleeding. Will require long-term oral AC however concerns initiating oral AC secondary history of recurrent falls, history of GIB, and poor social support.    REVIEW OF SYSTEMS:  Unable to Assess    VITALS  T(C): 36.6 (18 @ 08:18), Max: 37.1 (18 @ 18:05)  HR: 75 (18 @ 08:18) (74 - 99)  BP: 129/70 (18 @ 08:18) (118/27 - 133/61)  RR: 18 (18 @ 08:18) (18 - 18)  SpO2: 99% (18 @ 08:18) (92% - 99%)  Wt(kg): --    PHYSICAL EXAM:  Constitutional: NAD, awake and alert, well-developed  HEENT: PERR, EOMI, Normal Hearing, MMM  Neck: Soft and supple, No LAD, No JVD  Respiratory: Breath sounds are clear bilaterally, No wheezing, rales or rhonchi  Cardiovascular: S1 and S2, regular rate and rhythm, no Murmurs, gallops or rubs  Gastrointestinal: Bowel Sounds present, soft, nontender, nondistended, no guarding, no rebound  Extremities: LLE warmth, swelling, erythema, shiny/taut skin over anterior calf. No TTP.  Vascular: 2+ peripheral pulses  Neurological: A/O x 3, no focal deficits  Musculoskeletal: 5/5 strength b/l upper and lower extremities  Skin: No rashes    LABS  CARDIAC MARKERS ( 2018 05:29 )  0.074 ng/mL / x     / x     / x     / x      CARDIAC MARKERS ( 2018 01:28 )  0.077 ng/mL / x     / x     / x     / x      CARDIAC MARKERS ( 2018 19:00 )  0.062 ng/mL / x     / 949 U/L / x     / x                   10.0   15.4  )-----------( 1025     ( 2018 04:55 )             32.2     2018 04:55    143    |  112    |  51     ----------------------------<  91     3.9     |  22     |  1.51     Ca    8.5        2018 04:55  Phos  4.4       2018 05:29  Mg     2.3       2018 05:29    TPro  6.1    /  Alb  2.6    /  TBili  0.3    /  DBili  x      /  AST  38     /  ALT  16     /  AlkPhos  68     2018 05:29    PT/INR - ( 2018 04:55 )   PT: 12.1 sec;   INR: 1.12 ratio      PTT - ( 2018 09:36 )  PTT:71.3 sec  CAPILLARY BLOOD GLUCOSE    LIVER FUNCTIONS - ( 2018 05:29 )  Alb: 2.6 g/dL / Pro: 6.1 gm/dL / ALK PHOS: 68 U/L / ALT: 16 U/L / AST: 38 U/L / GGT: x           Urinalysis Basic - ( 2018 21:07 )    Color: Yellow / Appearance: Clear / S.025 / pH: x  Gluc: x / Ketone: Small  / Bili: Negative / Urobili: Negative mg/dL   Blood: x / Protein: 30 mg/dL / Nitrite: Negative   Leuk Esterase: Negative / RBC: 3-5 /HPF / WBC 0-2   Sq Epi: x / Non Sq Epi: Occasional / Bacteria: Occasional    MEDICATIONS  (STANDING):  amLODIPine   Tablet 10 milliGRAM(s) Oral daily  aspirin enteric coated 81 milliGRAM(s) Oral daily  cefepime  IVPB 500 milliGRAM(s) IV Intermittent every 24 hours  docusate sodium 100 milliGRAM(s) Oral three times a day  heparin  Infusion.  Unit(s)/Hr (12 mL/Hr) IV Continuous <Continuous>  hydroxyurea 1000 milliGRAM(s) Oral daily  sodium chloride 0.45%. 1000 milliLiter(s) (75 mL/Hr) IV Continuous <Continuous>  sodium chloride 0.9%. 1000 milliLiter(s) (75 mL/Hr) IV Continuous <Continuous>  sodium chloride 0.9%. 500 milliLiter(s) (250 mL/Hr) IV Continuous <Continuous>    MEDICATIONS  (PRN):  acetaminophen   Tablet. 650 milliGRAM(s) Oral every 6 hours PRN Mild Pain (1 - 3)  heparin  Injectable 5500 Unit(s) IV Push every 6 hours PRN For aPTT less than 40  heparin  Injectable 2500 Unit(s) IV Push every 6 hours PRN For aPTT between 40 - 57  ondansetron Injectable 4 milliGRAM(s) IV Push every 6 hours PRN Nausea

## 2018-01-14 NOTE — PROGRESS NOTE ADULT - SUBJECTIVE AND OBJECTIVE BOX
CHIEF COMPLAINT:    HPI:  History obtained by EMS and Niece as patient is a poor historian    89 year old male with past medical history of polycythemia, HTN, recently admitted for GIB (), BIBA for recurrent fall?   . Patient was found at home crawling on the floor by [employer]. Patient was reportedly found to be crawling on the floor and had urinated on himself. Niece was informed who eventually called EMS services to evaluate patient. Patient was brought in by EMS to [facility] for further evaluation. Patient has no children and lives alone. His closest relative is his niece who lives in [place]. Patient denies any chest pain or shortness of breath , does not remember all details , patient was having left knee pain who was scheduled to have procedure his left knee     In the ED, patient was to have thrombocytosis and Leukocytosis. Patient was treated empirically with Vancomycin and Cefepime. (2018 21:42)  his blood work showed borderline elevated troponin ,warranted  consult , patient had prior abnormal ekg     18 Patient is confused , alert wake , forgetful , noted to have  LLE DVT on IV heparin drip , denies any chest pain , BP is controlled       PAST MEDICAL & SURGICAL HISTORY:  Polycythemia  Tremor  HTN (hypertension)  History of back surgery      Allergies    No Known Allergies    Intolerances        SOCIAL HISTORY:  non smoker     FAMILY HISTORY:  No pertinent family history in first degree relatives      MEDICATIONS  (STANDING):  amLODIPine   Tablet 10 milliGRAM(s) Oral daily  aspirin enteric coated 81 milliGRAM(s) Oral daily  cefepime  IVPB 500 milliGRAM(s) IV Intermittent every 24 hours  docusate sodium 100 milliGRAM(s) Oral three times a day  heparin  Infusion.  Unit(s)/Hr (12 mL/Hr) IV Continuous <Continuous>  hydroxyurea 1000 milliGRAM(s) Oral daily  sodium chloride 0.9%. 1000 milliLiter(s) (75 mL/Hr) IV Continuous <Continuous>  sodium chloride 0.9%. 500 milliLiter(s) (250 mL/Hr) IV Continuous <Continuous>    MEDICATIONS  (PRN):  acetaminophen   Tablet. 650 milliGRAM(s) Oral every 6 hours PRN Mild Pain (1 - 3)  heparin  Injectable 5500 Unit(s) IV Push every 6 hours PRN For aPTT less than 40  heparin  Injectable 2500 Unit(s) IV Push every 6 hours PRN For aPTT between 40 - 57  ondansetron Injectable 4 milliGRAM(s) IV Push every 6 hours PRN Nausea      REVIEW OF SYSTEMS:    patient is forgetful   All other review of systems is negative unless indicated above    Vital Signs Last 24 Hrs  T(C): 36.6 (2018 08:18), Max: 37.1 (2018 18:05)  T(F): 97.9 (2018 08:18), Max: 98.7 (2018 18:05)  HR: 75 (2018 08:18) (74 - 99)  BP: 129/70 (2018 08:18) (114/53 - 133/61)  BP(mean): --  RR: 18 (2018 08:18) (17 - 18)  SpO2: 99% (2018 08:18) (92% - 100%)    I&O's Summary      PHYSICAL EXAM:    Constitutional: NAD, awake and alert, well-developed  HEENT: PERR, EOMI,  No oral cyananosis.  Neck:  supple,  No JVD  Respiratory: Breath sounds are clear bilaterally, No wheezing, rales or rhonchi  Cardiovascular: S1 and S2, regular rate and rhythm,  Gastrointestinal: Bowel Sounds present, soft, nontender.   Extremities: No peripheral edema. No clubbing or cyanosis.  Vascular: 2+ peripheral pulses  Neurological: A/O x 3,unsteady gait  due to arthritisi left knee pain   Musculoskeletal: no calf tenderness.  Skin: No rashes.      LABS: All Labs Reviewed:                        10.0   15.4  )-----------( 1025     ( 2018 04:55 )             32.2     -    143  |  112<H>  |  51<H>  ----------------------------<  91  3.9   |  22  |  1.51<H>    Ca    8.5      2018 04:55  Phos  4.4       Mg     2.3         TPro  6.1  /  Alb  2.6<L>  /  TBili  0.3  /  DBili  x   /  AST  38<H>  /  ALT  16  /  AlkPhos  68  -13    CARDIAC MARKERS ( 2018 05:29 )  0.074 ng/mL / x     / x     / x     / x      CARDIAC MARKERS ( 2018 01:28 )  0.077 ng/mL / x     / x     / x     / x      CARDIAC MARKERS ( 2018 19:00 )  0.062 ng/mL / x     / 949 U/L / x     / x          LIVER FUNCTIONS - ( 2018 05:29 )  Alb: 2.6 g/dL / Pro: 6.1 gm/dL / ALK PHOS: 68 U/L / ALT: 16 U/L / AST: 38 U/L / GGT: x           PT/INR - ( 2018 04:55 )   PT: 12.1 sec;   INR: 1.12 ratio         PTT - ( 2018 04:55 )  PTT:58.0 sec  Urinalysis Basic - ( 2018 21:07 )    Color: Yellow / Appearance: Clear / S.025 / pH: x  Gluc: x / Ketone: Small  / Bili: Negative / Urobili: Negative mg/dL   Blood: x / Protein: 30 mg/dL / Nitrite: Negative   Leuk Esterase: Negative / RBC: 3-5 /HPF / WBC 0-2   Sq Epi: x / Non Sq Epi: Occasional / Bacteria: Occasional                    RADIOLOGY/EK/12/18 Normal sinus rhythm left axis , RBBB    no significant change from prior EKG  echo moderate LVH , moderate MR , midl AI ,  normal EF     Monitor sinus rhythm

## 2018-01-14 NOTE — PROVIDER CONTACT NOTE (CRITICAL VALUE NOTIFICATION) - BACKGROUND
Pt has a Hx of Polycythemia, elevated PLT count has been ongoing and Pt is being treated with Hydroxyurea
Platelets previously elevated at 1216, pt with history of polycythemia.
Pt admitted s/p fall. Currently on heparin gtt for DVT in L leg

## 2018-01-14 NOTE — PROGRESS NOTE ADULT - SUBJECTIVE AND OBJECTIVE BOX
Diagnosed with LLE DVT- on iv heparin now.   Feels OK and wants to go home. Aware of blood clot and need for anticoagulation.    Vital Signs Last 24 Hrs  T(C): 36.6 (14 Jan 2018 08:18), Max: 37.1 (13 Jan 2018 18:05)  T(F): 97.9 (14 Jan 2018 08:18), Max: 98.7 (13 Jan 2018 18:05)  HR: 75 (14 Jan 2018 08:18) (74 - 99)  BP: 129/70 (14 Jan 2018 08:18) (118/27 - 133/61)  BP(mean): --  RR: 18 (14 Jan 2018 08:18) (18 - 18)  SpO2: 99% (14 Jan 2018 08:18) (92% - 99%)    Awake alert  mental status seems to be better- almost back to baseline. Chest clear. Abdomen soft. Minimal LLE edema.                           10.0   15.4  )-----------( 1025     ( 14 Jan 2018 04:55 )             32.2     01-14    143  |  112<H>  |  51<H>  ----------------------------<  91  3.9   |  22  |  1.51<H>    Ca    8.5      14 Jan 2018 04:55  Phos  4.4     01-13  Mg     2.3     01-13    TPro  6.1  /  Alb  2.6<L>  /  TBili  0.3  /  DBili  x   /  AST  38<H>  /  ALT  16  /  AlkPhos  68  01-13      < from: US Duplex Venous Lower Ext Ltd, Left (01.13.18 @ 15:12) >    EXAM:  US DPLX LWR EXT VEINS LTD LT                            PROCEDURE DATE:  01/13/2018          INTERPRETATION:  Exam Date: 1/13/2018 3:12 PM    Ultrasound of the left lower extremity deep venous system     CPT 41642    CLINICAL INFORMATION: Left leg swelling    TECHNIQUE:   Doppler grayscale and color ultrasonography of the left   lower extremity deep venous system was performed.  The veins evaluated   included the common femoral vein, the inflow of the greater saphenous   vein, the superficial femoral vein and the popliteal vein.      FINDINGS:    No previous examinations are available for review.    There is poor compression and poor flow within the left common femoral   vein, left superficial femoral vein, and left popliteal vein, compatible   with nonocclusive deep vein thrombus. There is normal flow and   compressibility within the left posterior tibial vein.    IMPRESSION:   There is poor compression and poor flow within the left   common femoral vein, left superficial femoral vein, and left popliteal   vein, compatible with nonocclusive deep vein thrombus.       Critical value:  I discussed the finding of this report with Dr. Kayserian at 3:20 PM on January 13, 2018.  Critical value policy of the   hospital was followed.  Read back and confirmation of receipt of this   communication was performed.  This verbal communication supplements the   text report of this document.      MASHA HOOPER M.D., ATTENDING RADIOLOGIST  This document has been electronically signed. Jan 13 2018  3:26PM        MEDICATIONS  (STANDING):  amLODIPine   Tablet 10 milliGRAM(s) Oral daily  aspirin enteric coated 81 milliGRAM(s) Oral daily  cefepime  IVPB 500 milliGRAM(s) IV Intermittent every 24 hours  docusate sodium 100 milliGRAM(s) Oral three times a day  heparin  Infusion.  Unit(s)/Hr (12 mL/Hr) IV Continuous <Continuous>  hydroxyurea 1000 milliGRAM(s) Oral daily  sodium chloride 0.45%. 1000 milliLiter(s) (75 mL/Hr) IV Continuous <Continuous>  sodium chloride 0.9%. 1000 milliLiter(s) (75 mL/Hr) IV Continuous <Continuous>  sodium chloride 0.9%. 500 milliLiter(s) (250 mL/Hr) IV Continuous <Continuous>

## 2018-01-15 LAB
ANION GAP SERPL CALC-SCNC: 8 MMOL/L — SIGNIFICANT CHANGE UP (ref 5–17)
APTT BLD: 51.8 SEC — HIGH (ref 27.5–37.4)
APTT BLD: 59.5 SEC — HIGH (ref 27.5–37.4)
APTT BLD: 68.2 SEC — HIGH (ref 27.5–37.4)
BUN SERPL-MCNC: 40 MG/DL — HIGH (ref 7–23)
CALCIUM SERPL-MCNC: 8.2 MG/DL — LOW (ref 8.5–10.1)
CHLORIDE SERPL-SCNC: 109 MMOL/L — HIGH (ref 96–108)
CO2 SERPL-SCNC: 24 MMOL/L — SIGNIFICANT CHANGE UP (ref 22–31)
CREAT SERPL-MCNC: 1.22 MG/DL — SIGNIFICANT CHANGE UP (ref 0.5–1.3)
GLUCOSE SERPL-MCNC: 90 MG/DL — SIGNIFICANT CHANGE UP (ref 70–99)
HCT VFR BLD CALC: 29.8 % — LOW (ref 39–50)
HGB BLD-MCNC: 9.4 G/DL — LOW (ref 13–17)
INR BLD: 1.12 RATIO — SIGNIFICANT CHANGE UP (ref 0.88–1.16)
MCHC RBC-ENTMCNC: 28.9 PG — SIGNIFICANT CHANGE UP (ref 27–34)
MCHC RBC-ENTMCNC: 31.6 GM/DL — LOW (ref 32–36)
MCV RBC AUTO: 91.6 FL — SIGNIFICANT CHANGE UP (ref 80–100)
PLATELET # BLD AUTO: 808 K/UL — HIGH (ref 150–400)
POTASSIUM SERPL-MCNC: 4 MMOL/L — SIGNIFICANT CHANGE UP (ref 3.5–5.3)
POTASSIUM SERPL-SCNC: 4 MMOL/L — SIGNIFICANT CHANGE UP (ref 3.5–5.3)
PROTHROM AB SERPL-ACNC: 12.1 SEC — SIGNIFICANT CHANGE UP (ref 9.8–12.7)
RBC # BLD: 3.26 M/UL — LOW (ref 4.2–5.8)
RBC # FLD: 20 % — HIGH (ref 10.3–14.5)
SODIUM SERPL-SCNC: 141 MMOL/L — SIGNIFICANT CHANGE UP (ref 135–145)
WBC # BLD: 13.8 K/UL — HIGH (ref 3.8–10.5)
WBC # FLD AUTO: 13.8 K/UL — HIGH (ref 3.8–10.5)

## 2018-01-15 PROCEDURE — 99233 SBSQ HOSP IP/OBS HIGH 50: CPT

## 2018-01-15 PROCEDURE — 95819 EEG AWAKE AND ASLEEP: CPT | Mod: 26

## 2018-01-15 PROCEDURE — 99231 SBSQ HOSP IP/OBS SF/LOW 25: CPT

## 2018-01-15 PROCEDURE — 93306 TTE W/DOPPLER COMPLETE: CPT | Mod: 26

## 2018-01-15 RX ADMIN — Medication 100 MILLIGRAM(S): at 15:16

## 2018-01-15 RX ADMIN — Medication 100 MILLIGRAM(S): at 05:25

## 2018-01-15 RX ADMIN — HEPARIN SODIUM 1100 UNIT(S)/HR: 5000 INJECTION INTRAVENOUS; SUBCUTANEOUS at 15:16

## 2018-01-15 RX ADMIN — HEPARIN SODIUM 1100 UNIT(S)/HR: 5000 INJECTION INTRAVENOUS; SUBCUTANEOUS at 21:51

## 2018-01-15 RX ADMIN — AMLODIPINE BESYLATE 10 MILLIGRAM(S): 2.5 TABLET ORAL at 05:25

## 2018-01-15 RX ADMIN — Medication 81 MILLIGRAM(S): at 11:13

## 2018-01-15 RX ADMIN — SODIUM CHLORIDE 75 MILLILITER(S): 9 INJECTION, SOLUTION INTRAVENOUS at 11:15

## 2018-01-15 RX ADMIN — HEPARIN SODIUM 1100 UNIT(S)/HR: 5000 INJECTION INTRAVENOUS; SUBCUTANEOUS at 06:46

## 2018-01-15 RX ADMIN — HYDROXYUREA 1000 MILLIGRAM(S): 500 CAPSULE ORAL at 19:28

## 2018-01-15 NOTE — PHYSICAL THERAPY INITIAL EVALUATION ADULT - ADDITIONAL COMMENTS
as per patient who is a poor historian, he lives alone in a cottage, 0 MICHA, 0 steps inside, ambulates with B crutches and RW, + shower chair.

## 2018-01-15 NOTE — PROGRESS NOTE ADULT - ATTENDING COMMENTS
Patient seen and examined with Jose Wing, Shay Decker and Ced Webster on the Family Medicine Teaching Service.  Agree with history, physical, labs and plan which were reviewed in detail.

## 2018-01-15 NOTE — PROGRESS NOTE ADULT - SUBJECTIVE AND OBJECTIVE BOX
89M ww/PMH of polycythemia, HTN, recently admitted for GI bleed Dec 2017, BIBA for recurrent falls. Pt was found at home crawling on the floor and having lost control of his bowels. Patient has no children and lives alone. His closest relative is his niece who lives in [place]. History obtained by EMS and Niece as patient is a poor historian. In the ED, patient was found to have thrombocytosis and Leukocytosis. Patient was treated empirically with Vancomycin and Cefepime.     1/13/18 - Patient seen and examined at bedside. Patient has tangential thought, no complaints. Patient cannot give history or proper Review of Systems. AAOX2, oriented to person and place, but his answers to questions are inappropriate. Patient noticed to have LLE swelling/erythema, and when prompted admitted pain. Doppler US studies resulted a DVT. Recent GI Bleed, and PMHx of Thrombocytosis makes treatment options risky. Case discussed with hem/onc, no contraindications due to thrombocytosis, but referred to call GI. GI did not do intervention for GI Bleed last time, (no Endoscopy) due to high risk patient status. Decision will lie in HCP's hands. Patient HCP Derrick and Wendie were called, no answer, but they were in the hospital. Case discussed in depth, risks and benefits, considering the history, of giving Heparin treatment, which may cause bleeds, versus no treatment which may result in a pulmonary embolism. HCP decided on Hep gtt. APTT Ordered and Hep gtt started. Patient also started on Hydroxyurea, and [id number]. Will d/c [id number] for now due to hep gtt. Neurology consult appreciated, EEG, f/u b12/folate for AMS.    (01/14/18) Patient seen and examined at bedside. Patient continues to have tangential thinking with periods of clarity. Patient tolerating Heparin gtt well without acute signs of bleeding. Will require long-term oral AC however concerns initiating oral AC secondary history of recurrent falls, history of GIB, and poor social support.    ROS  Unable to assess due to pt status    MEDICATIONS  (STANDING):  amLODIPine   Tablet 10 milliGRAM(s) Oral daily  aspirin enteric coated 81 milliGRAM(s) Oral daily  docusate sodium 100 milliGRAM(s) Oral three times a day  heparin  Infusion.  Unit(s)/Hr (12 mL/Hr) IV Continuous <Continuous>  hydroxyurea 1000 milliGRAM(s) Oral daily  sodium chloride 0.45%. 1000 milliLiter(s) (75 mL/Hr) IV Continuous <Continuous>  sodium chloride 0.9%. 1000 milliLiter(s) (75 mL/Hr) IV Continuous <Continuous>  sodium chloride 0.9%. 500 milliLiter(s) (250 mL/Hr) IV Continuous <Continuous>    MEDICATIONS  (PRN):  acetaminophen   Tablet. 650 milliGRAM(s) Oral every 6 hours PRN Mild Pain (1 - 3)  heparin  Injectable 5500 Unit(s) IV Push every 6 hours PRN For aPTT less than 40  heparin  Injectable 2500 Unit(s) IV Push every 6 hours PRN For aPTT between 40 - 57  ondansetron Injectable 4 milliGRAM(s) IV Push every 6 hours PRN Nausea    PHYSICAL EXAM  Vital Signs Last 24 Hrs  T(C): 36.7 (15 Clemente 2018 17:28), Max: 36.8 (14 Jan 2018 20:53)  T(F): 98.1 (15 Clemente 2018 17:28), Max: 98.3 (14 Jan 2018 20:53)  HR: 78 (15 Clemente 2018 17:28) (78 - 81)  BP: 128/69 (15 Clemente 2018 17:28) (128/69 - 141/57)  BP(mean): --  RR: 19 (15 Clemente 2018 17:28) (18 - 19)  SpO2: 96% (15 Clemente 2018 17:28) (96% - 97%)    Constitutional: Pt lying in bed, awake and alert, NAD  HEENT: EOMI, normal hearing, moist mucous membranes  Neck: Soft and supple, no JVD  Respiratory: CTABL, No wheezing, rales or rhonchi  Cardiovascular: S1S2+, RRR, no M/G/R  Gastrointestinal: BS+, soft, NT/ND, no guarding, no rebound  Extremities: LLE swelling, improved. No TTP  Neurological: AAOx3, no focal deficits  Musculoskeletal: 5/5 strength b/l upper and lower extremities  Skin: No rashes    LABS: All Labs Reviewed:                        9.4    13.8  )-----------( 808      ( 15 Clemente 2018 05:45 )             29.8     01-15    141  |  109<H>  |  40<H>  ----------------------------<  90  4.0   |  24  |  1.22    Ca    8.2<L>      15 Clemente 2018 05:45      PT/INR - ( 15 Clemente 2018 05:45 )   PT: 12.1 sec;   INR: 1.12 ratio       PTT - ( 15 Clemente 2018 13:19 )  PTT:68.2 sec      Blood Culture: 01-12 @ 21:35  Organism --  Gram Stain Blood -- Gram Stain --  Specimen Source .Blood None  Culture-Blood --    01-12 @ 21:29  Organism --  Gram Stain Blood -- Gram Stain --  Specimen Source .Blood None  Culture-Blood --    01-12 @ 21:07  Organism --  Gram Stain Blood -- Gram Stain --  Specimen Source .Urine None  Culture-Blood --    RADS  < from: CT Head No Cont (01.12.18 @ 19:29) >  Impression:    Age related involutional and microvascular ischemic changes, without   evidence of intracranial hemorrhage, mass effect or midline shift.    < end of copied text >  < from: CT Abdomen and Pelvis No Cont (01.12.18 @ 23:08) >  IMPRESSION:     Splenomegalymeasuring 14 cm in cc dimension x 8 cm in TR dimension.      No evidence of pneumonia. Reidentified is a semisolid semigroundglass   opacity in the posterior basilar right lower lobe with surrounding   scarring and traction bronchiectasis, nonspecific, unchanged since prior   exam, neoplasm cannot be entirely excluded as previously discussed. 8 mm   nodule in the right lower lobe, unchanged since prior exam of 1 month   prior.     Multilevel advanced degenerative changes of the thoracic and lumbar spine   with severe central spinal canal stenosis at L2/L3-L5/S1 and multilevel   severe neural foraminal stenosis of the lumbar spine.  Advanced   degenerative changes of the bilateral hips.    < end of copied text >  < from: US Duplex Venous Lower Ext Ltd, Left (01.13.18 @ 15:12) >  IMPRESSION:   There is poor compression and poor flow within the left   common femoral vein, left superficial femoral vein, and left popliteal   vein, compatible with nonocclusive deep vein thrombus.     < end of copied text > 89M ww/PMH of polycythemia, HTN, recently admitted for GI bleed Dec 2017, BIBA for recurrent falls. Pt was found at home crawling on the floor and having lost control of his bowels. Patient has no children and lives alone. His closest relative is his niece who lives in [place]. History obtained by EMS and Niece as patient is a poor historian. In the ED, patient was found to have thrombocytosis and Leukocytosis. Patient was treated empirically with Vancomycin and Cefepime.     1/15/18: Pt seen and evaluated at bedside. Pt has no complaints at this time and was able to keep train of thought. Patient tolerating Heparin gtt well without acute signs of bleeding. Will require long-term oral AC however concerns initiating oral AC secondary history of recurrent falls, history of GI bleed, and poor social support. Will discuss w/ family AC and dispo.     ROS  Unable to assess due to pt status    MEDICATIONS  (STANDING):  amLODIPine   Tablet 10 milliGRAM(s) Oral daily  aspirin enteric coated 81 milliGRAM(s) Oral daily  docusate sodium 100 milliGRAM(s) Oral three times a day  heparin  Infusion.  Unit(s)/Hr (12 mL/Hr) IV Continuous <Continuous>  hydroxyurea 1000 milliGRAM(s) Oral daily  sodium chloride 0.45%. 1000 milliLiter(s) (75 mL/Hr) IV Continuous <Continuous>  sodium chloride 0.9%. 1000 milliLiter(s) (75 mL/Hr) IV Continuous <Continuous>  sodium chloride 0.9%. 500 milliLiter(s) (250 mL/Hr) IV Continuous <Continuous>    MEDICATIONS  (PRN):  acetaminophen   Tablet. 650 milliGRAM(s) Oral every 6 hours PRN Mild Pain (1 - 3)  heparin  Injectable 5500 Unit(s) IV Push every 6 hours PRN For aPTT less than 40  heparin  Injectable 2500 Unit(s) IV Push every 6 hours PRN For aPTT between 40 - 57  ondansetron Injectable 4 milliGRAM(s) IV Push every 6 hours PRN Nausea    PHYSICAL EXAM  Vital Signs Last 24 Hrs  T(C): 36.7 (15 Clemente 2018 17:28), Max: 36.8 (14 Jan 2018 20:53)  T(F): 98.1 (15 Clemente 2018 17:28), Max: 98.3 (14 Jan 2018 20:53)  HR: 78 (15 Clemente 2018 17:28) (78 - 81)  BP: 128/69 (15 Clemente 2018 17:28) (128/69 - 141/57)  BP(mean): --  RR: 19 (15 Clemente 2018 17:28) (18 - 19)  SpO2: 96% (15 Clemente 2018 17:28) (96% - 97%)    Constitutional: Pt lying in bed, awake and alert, NAD  HEENT: EOMI, normal hearing, moist mucous membranes  Neck: Soft and supple, no JVD  Respiratory: CTABL, No wheezing, rales or rhonchi  Cardiovascular: S1S2+, RRR, no M/G/R  Gastrointestinal: BS+, soft, NT/ND, no guarding, no rebound  Extremities: LLE swelling, improved. No TTP  Neurological: AAOx3, no focal deficits  Musculoskeletal: 5/5 strength b/l upper and lower extremities  Skin: No rashes    LABS: All Labs Reviewed:                        9.4    13.8  )-----------( 808      ( 15 Clemente 2018 05:45 )             29.8     01-15    141  |  109<H>  |  40<H>  ----------------------------<  90  4.0   |  24  |  1.22    Ca    8.2<L>      15 Clemente 2018 05:45      PT/INR - ( 15 Clemente 2018 05:45 )   PT: 12.1 sec;   INR: 1.12 ratio       PTT - ( 15 Clemente 2018 13:19 )  PTT:68.2 sec      Blood Culture: 01-12 @ 21:35  Organism --  Gram Stain Blood -- Gram Stain --  Specimen Source .Blood None  Culture-Blood --    01-12 @ 21:29  Organism --  Gram Stain Blood -- Gram Stain --  Specimen Source .Blood None  Culture-Blood --    01-12 @ 21:07  Organism --  Gram Stain Blood -- Gram Stain --  Specimen Source .Urine None  Culture-Blood --    RADS  < from: CT Head No Cont (01.12.18 @ 19:29) >  Impression:    Age related involutional and microvascular ischemic changes, without   evidence of intracranial hemorrhage, mass effect or midline shift.    < end of copied text >  < from: CT Abdomen and Pelvis No Cont (01.12.18 @ 23:08) >  IMPRESSION:     Splenomegalymeasuring 14 cm in cc dimension x 8 cm in TR dimension.      No evidence of pneumonia. Reidentified is a semisolid semigroundglass   opacity in the posterior basilar right lower lobe with surrounding   scarring and traction bronchiectasis, nonspecific, unchanged since prior   exam, neoplasm cannot be entirely excluded as previously discussed. 8 mm   nodule in the right lower lobe, unchanged since prior exam of 1 month   prior.     Multilevel advanced degenerative changes of the thoracic and lumbar spine   with severe central spinal canal stenosis at L2/L3-L5/S1 and multilevel   severe neural foraminal stenosis of the lumbar spine.  Advanced   degenerative changes of the bilateral hips.    < end of copied text >  < from: US Duplex Venous Lower Ext Ltd, Left (01.13.18 @ 15:12) >  IMPRESSION:   There is poor compression and poor flow within the left   common femoral vein, left superficial femoral vein, and left popliteal   vein, compatible with nonocclusive deep vein thrombus.     < end of copied text >

## 2018-01-15 NOTE — PHYSICAL THERAPY INITIAL EVALUATION ADULT - GENERAL OBSERVATIONS, REHAB EVAL
pt received supine in bed on 3N.  pt without any complaints. required encouragement for participation with PT as he felt it is "too soon" to get OOB.

## 2018-01-15 NOTE — PHYSICAL THERAPY INITIAL EVALUATION ADULT - DISCHARGE DISPOSITION, PT EVAL
Recommend continued skilled PT and APRIL when medically stable for discharge./rehabilitation facility

## 2018-01-15 NOTE — PHYSICAL THERAPY INITIAL EVALUATION ADULT - GAIT DISTANCE, PT EVAL
a few steps forward/backward and sidestepping along EOB, pt refused to ambulate further stating it's "too soon" and his lunch had arrived and wanted to return back to bed to each his lunch.

## 2018-01-15 NOTE — PHYSICAL THERAPY INITIAL EVALUATION ADULT - ORIENTATION, REHAB EVAL
place/confused as to why he is in the hospital, denies falling and states his family put him here so that he can go to a nursing home/person/time

## 2018-01-15 NOTE — PROGRESS NOTE ADULT - SUBJECTIVE AND OBJECTIVE BOX
CHIEF COMPLAINT:    HPI:  History obtained by EMS and Niece as patient is a poor historian    89 year old male with past medical history of polycythemia, HTN, recently admitted for GIB (), BIBA for recurrent fall?   . Patient was found at home crawling on the floor by [employer]. Patient was reportedly found to be crawling on the floor and had urinated on himself. Niece was informed who eventually called EMS services to evaluate patient. Patient was brought in by EMS to [facility] for further evaluation. Patient has no children and lives alone. His closest relative is his niece who lives in [place]. Patient denies any chest pain or shortness of breath , does not remember all details , patient was having left knee pain who was scheduled to have procedure his left knee     In the ED, patient was to have thrombocytosis and Leukocytosis. Patient was treated empirically with Vancomycin and Cefepime. (2018 21:42)  his blood work showed borderline elevated troponin ,warranted  consult , patient had prior abnormal ekg     18 Patient is confused , alert wake , forgetful , noted to have  LLE DVT on IV heparin drip , denies any chest pain , BP is controlled       PAST MEDICAL & SURGICAL HISTORY:  Polycythemia  Tremor  HTN (hypertension)  History of back surgery      MEDICATIONS  (STANDING):  amLODIPine   Tablet 10 milliGRAM(s) Oral daily  aspirin enteric coated 81 milliGRAM(s) Oral daily  docusate sodium 100 milliGRAM(s) Oral three times a day  heparin  Infusion.  Unit(s)/Hr (12 mL/Hr) IV Continuous <Continuous>  hydroxyurea 1000 milliGRAM(s) Oral daily  sodium chloride 0.45%. 1000 milliLiter(s) (75 mL/Hr) IV Continuous <Continuous>  sodium chloride 0.9%. 1000 milliLiter(s) (75 mL/Hr) IV Continuous <Continuous>  sodium chloride 0.9%. 500 milliLiter(s) (250 mL/Hr) IV Continuous <Continuous>    MEDICATIONS  (PRN):  acetaminophen   Tablet. 650 milliGRAM(s) Oral every 6 hours PRN Mild Pain (1 - 3)  heparin  Injectable 5500 Unit(s) IV Push every 6 hours PRN For aPTT less than 40  heparin  Injectable 2500 Unit(s) IV Push every 6 hours PRN For aPTT between 40 - 57  ondansetron Injectable 4 milliGRAM(s) IV Push every 6 hours PRN Nausea      Vital Signs Last 24 Hrs  T(C): 36.7 (15 Clemente 2018 05:01), Max: 36.8 (2018 20:53)  T(F): 98.1 (15 Clemente 2018 05:01), Max: 98.3 (2018 20:53)  HR: 78 (15 Clemente 2018 05:01) (78 - 101)  BP: 141/57 (15 Clemente 2018 05:01) (125/62 - 141/57)  BP(mean): --  RR: 19 (15 Clemente 2018 05:01) (17 - 19)  SpO2: 97% (15 Clemente 2018 05:01) (96% - 98%)    I&O's Summary    2018 07:01  -  15 Clemente 2018 07:00  --------------------------------------------------------  IN: 0 mL / OUT: 350 mL / NET: -350 mL            PHYSICAL EXAM:    Constitutional: NAD, awake and alert, well-developed  HEENT: PERR, EOMI,  No oral cyananosis.  Neck:  supple,  No JVD  Respiratory: Breath sounds are clear bilaterally, No wheezing, rales or rhonchi  Cardiovascular: S1 and S2, regular rate and rhythm,  Gastrointestinal: Bowel Sounds present, soft, nontender.   Extremities: No peripheral edema. No clubbing or cyanosis.  Vascular: 2+ peripheral pulses  Neurological: A/O x 3,unsteady gait  due to arthritisi left knee pain   Musculoskeletal: no calf tenderness.  Skin: No rashes.      LABS: All Labs Reviewed:                          9.4    13.8  )-----------( 808      ( 15 Clemente 2018 05:45 )             29.8     01-15    141  |  109<H>  |  40<H>  ----------------------------<  90  4.0   |  24  |  1.22    Ca    8.2<L>      15 Clemente 2018 05:45            PT/INR - ( 15 Clemente 2018 05:45 )   PT: 12.1 sec;   INR: 1.12 ratio         PTT - ( 15 Clemente 2018 05:45 )  PTT:51.8 sec    Culture - Blood (18 @ 21:35)    Specimen Source: .Blood None    Culture Results:   No growth to date.                  RADIOLOGY/EK/12/18 Normal sinus rhythm left axis , RBBB    no significant change from prior EKG  echo moderate LVH , moderate MR , midl AI ,  normal EF     Monitor sinus rhythm

## 2018-01-15 NOTE — PROGRESS NOTE ADULT - SUBJECTIVE AND OBJECTIVE BOX
Stable. No new issues.  On iv heparin for LLE DVT. H/H stable- no clinical bleeding.    Vital Signs Last 24 Hrs  T(C): 36.7 (15 Clemente 2018 05:01), Max: 36.8 (14 Jan 2018 20:53)  T(F): 98.1 (15 Clemente 2018 05:01), Max: 98.3 (14 Jan 2018 20:53)  HR: 78 (15 Clemente 2018 05:01) (78 - 101)  BP: 141/57 (15 Clemente 2018 05:01) (125/62 - 141/57)  BP(mean): --  RR: 19 (15 Clemente 2018 05:01) (17 - 19)  SpO2: 97% (15 Clemente 2018 05:01) (96% - 98%)      Awake, alert. Mental status seems back to baseline.  Chest clear. Left lower extremity edema resolved.                          9.4    13.8  )-----------( 808      ( 15 Clemente 2018 05:45 )             29.8     MEDICATIONS  (STANDING):  amLODIPine   Tablet 10 milliGRAM(s) Oral daily  aspirin enteric coated 81 milliGRAM(s) Oral daily  docusate sodium 100 milliGRAM(s) Oral three times a day  heparin  Infusion.  Unit(s)/Hr (12 mL/Hr) IV Continuous <Continuous>  hydroxyurea 1000 milliGRAM(s) Oral daily  sodium chloride 0.45%. 1000 milliLiter(s) (75 mL/Hr) IV Continuous <Continuous>  sodium chloride 0.9%. 1000 milliLiter(s) (75 mL/Hr) IV Continuous <Continuous>  sodium chloride 0.9%. 500 milliLiter(s) (250 mL/Hr) IV Continuous <Continuous>

## 2018-01-15 NOTE — PHYSICAL THERAPY INITIAL EVALUATION ADULT - PERTINENT HX OF CURRENT PROBLEM, REHAB EVAL
89M BIBA for recurrent falls. Patient was found at home crawling on the floor by [employer]. Patient has no children and lives alone. His closest relative is his niece who lives in [place]. In the ED, patient was to have thrombocytosis and Leukocytosis. CT Head: Age-Related Changes. CT C-Spine: No fracture of Subluxation.  Dopper showed + DVT LLE.

## 2018-01-16 LAB
ANION GAP SERPL CALC-SCNC: 7 MMOL/L — SIGNIFICANT CHANGE UP (ref 5–17)
APTT BLD: 77.7 SEC — HIGH (ref 27.5–37.4)
BUN SERPL-MCNC: 34 MG/DL — HIGH (ref 7–23)
CALCIUM SERPL-MCNC: 7.9 MG/DL — LOW (ref 8.5–10.1)
CHLORIDE SERPL-SCNC: 109 MMOL/L — HIGH (ref 96–108)
CO2 SERPL-SCNC: 23 MMOL/L — SIGNIFICANT CHANGE UP (ref 22–31)
CREAT SERPL-MCNC: 1.11 MG/DL — SIGNIFICANT CHANGE UP (ref 0.5–1.3)
GLUCOSE SERPL-MCNC: 95 MG/DL — SIGNIFICANT CHANGE UP (ref 70–99)
HCT VFR BLD CALC: 29.4 % — LOW (ref 39–50)
HGB BLD-MCNC: 9.5 G/DL — LOW (ref 13–17)
INR BLD: 1.15 RATIO — SIGNIFICANT CHANGE UP (ref 0.88–1.16)
MCHC RBC-ENTMCNC: 29.1 PG — SIGNIFICANT CHANGE UP (ref 27–34)
MCHC RBC-ENTMCNC: 32.1 GM/DL — SIGNIFICANT CHANGE UP (ref 32–36)
MCV RBC AUTO: 90.4 FL — SIGNIFICANT CHANGE UP (ref 80–100)
PLATELET # BLD AUTO: 720 K/UL — HIGH (ref 150–400)
POTASSIUM SERPL-MCNC: 4.4 MMOL/L — SIGNIFICANT CHANGE UP (ref 3.5–5.3)
POTASSIUM SERPL-SCNC: 4.4 MMOL/L — SIGNIFICANT CHANGE UP (ref 3.5–5.3)
PROTHROM AB SERPL-ACNC: 12.5 SEC — SIGNIFICANT CHANGE UP (ref 9.8–12.7)
RBC # BLD: 3.26 M/UL — LOW (ref 4.2–5.8)
RBC # FLD: 19.8 % — HIGH (ref 10.3–14.5)
SODIUM SERPL-SCNC: 139 MMOL/L — SIGNIFICANT CHANGE UP (ref 135–145)
WBC # BLD: 15.4 K/UL — HIGH (ref 3.8–10.5)
WBC # FLD AUTO: 15.4 K/UL — HIGH (ref 3.8–10.5)

## 2018-01-16 PROCEDURE — 99232 SBSQ HOSP IP/OBS MODERATE 35: CPT

## 2018-01-16 RX ORDER — APIXABAN 2.5 MG/1
10 TABLET, FILM COATED ORAL EVERY 12 HOURS
Qty: 0 | Refills: 0 | Status: DISCONTINUED | OUTPATIENT
Start: 2018-01-16 | End: 2018-01-17

## 2018-01-16 RX ORDER — APIXABAN 2.5 MG/1
5 TABLET, FILM COATED ORAL EVERY 12 HOURS
Qty: 0 | Refills: 0 | Status: CANCELLED | OUTPATIENT
Start: 2018-01-24 | End: 2018-01-17

## 2018-01-16 RX ORDER — HYDROXYUREA 500 MG/1
1500 CAPSULE ORAL
Qty: 0 | Refills: 0 | COMMUNITY

## 2018-01-16 RX ADMIN — SODIUM CHLORIDE 75 MILLILITER(S): 9 INJECTION, SOLUTION INTRAVENOUS at 13:09

## 2018-01-16 RX ADMIN — Medication 100 MILLIGRAM(S): at 21:05

## 2018-01-16 RX ADMIN — HYDROXYUREA 1000 MILLIGRAM(S): 500 CAPSULE ORAL at 18:45

## 2018-01-16 RX ADMIN — Medication 100 MILLIGRAM(S): at 06:55

## 2018-01-16 RX ADMIN — APIXABAN 10 MILLIGRAM(S): 2.5 TABLET, FILM COATED ORAL at 18:44

## 2018-01-16 RX ADMIN — Medication 81 MILLIGRAM(S): at 12:45

## 2018-01-16 RX ADMIN — HEPARIN SODIUM 1100 UNIT(S)/HR: 5000 INJECTION INTRAVENOUS; SUBCUTANEOUS at 07:35

## 2018-01-16 RX ADMIN — AMLODIPINE BESYLATE 10 MILLIGRAM(S): 2.5 TABLET ORAL at 06:55

## 2018-01-16 NOTE — PROGRESS NOTE ADULT - SUBJECTIVE AND OBJECTIVE BOX
REASON FOR VISIT: Medication management, Abnormal ECG    HPI:  89 year old man with myeloproliferative disorder, HTN, recent hospitalization for GI bleed, admitted 1/12/18 with encephalopathy, left lower extremity DVT.    1/16/18:  Comfortable and without new complaint; less LLE pain/edema.  Denies: dyspnea, chest pain, palpitations.    MEDICATIONS  (STANDING):  amLODIPine   Tablet 10 milliGRAM(s) Oral daily  aspirin enteric coated 81 milliGRAM(s) Oral daily  docusate sodium 100 milliGRAM(s) Oral three times a day  heparin  Infusion.  Unit(s)/Hr (12 mL/Hr) IV Continuous <Continuous>  hydroxyurea 1000 milliGRAM(s) Oral daily    MEDICATIONS  (PRN):  acetaminophen   Tablet. 650 milliGRAM(s) Oral every 6 hours PRN Mild Pain (1 - 3)  heparin  Injectable 5500 Unit(s) IV Push every 6 hours PRN For aPTT less than 40  heparin  Injectable 2500 Unit(s) IV Push every 6 hours PRN For aPTT between 40 - 57  ondansetron Injectable 4 milliGRAM(s) IV Push every 6 hours PRN Nausea    Vital Signs Last 24 Hrs  T(C): 36.6 (16 Jan 2018 05:38), Max: 36.7 (15 Clemente 2018 20:50)  T(F): 97.9 (16 Jan 2018 05:38), Max: 98 (15 Clemente 2018 20:50)  HR: 79 (16 Jan 2018 05:38) (79 - 90)  BP: 142/67 (16 Jan 2018 05:38) (117/61 - 158/69)  RR: 18 (16 Jan 2018 05:38) (18 - 19)  SpO2: 100% (16 Jan 2018 05:38) (99% - 100%)    PHYSICAL EXAM:  Constitutional: Appears stated age, no distress, awake and alert  Neck:  supple,  No JVD  Respiratory: Breath sounds are clear bilaterally, No wheezing, rales or rhonchi  Cardiovascular: S1 and S2, regular rate and rhythm, II/VI systolic murmur  Gastrointestinal: Bowel Sounds present, soft, nontender.   Musculoskeletal: no calf tenderness.  Skin: No rash.  Psych:  Mood and affect appropriate.          LABS:                     9.5    15.4  )-----------( 720      ( 16 Jan 2018 04:58 )             29.4     139  |  109<H>  |  34<H>  ----------------------------<  95  4.4   |  23  |  1.11    12 Lead ECG (01.14.18 @ 06:57) >  Sinus rhythm with occasional Prematureventricular complexes and Premature atrial complexes.  Left axis deviation.  Right bundle branch block.  T wave abnormality, consider inferolateral ischemia.

## 2018-01-16 NOTE — PROGRESS NOTE ADULT - SUBJECTIVE AND OBJECTIVE BOX
89M ww/PMH of polycythemia, HTN, recently admitted for GI bleed Dec 2017, BIBA for recurrent falls. Pt was found at home crawling on the floor and having lost control of his bowels. Patient has no children and lives alone. His closest relative is his niece who lives in [place]. History obtained by EMS and Niece as patient is a poor historian. In the ED, patient was found to have thrombocytosis and Leukocytosis. Patient was treated empirically with Vancomycin and Cefepime.     1/15/18: Pt seen and evaluated at bedside. Pt has no complaints at this time and was able to keep train of thought. Patient tolerating Heparin gtt well without acute signs of bleeding. Will require long-term oral AC however concerns initiating oral AC secondary history of recurrent falls, history of GI bleed, and poor social support. Will discuss w/ family AC and dispo.     1/16/18: Pt seen and evaluated at bedside. Pt has no complaints, continues to tolerate heparin gtt well w/o signs of bleeding. Mental status improving. Tentative plan for D/C to rehab, followed by long term care discussed w/ SW and family. Family also aware of and consent to D/C on AC, discussed R/B/A.     ROS  Unable to assess due to pt status    MEDICATIONS  (STANDING):  amLODIPine   Tablet 10 milliGRAM(s) Oral daily  aspirin enteric coated 81 milliGRAM(s) Oral daily  docusate sodium 100 milliGRAM(s) Oral three times a day  heparin  Infusion.  Unit(s)/Hr (12 mL/Hr) IV Continuous <Continuous>  hydroxyurea 1000 milliGRAM(s) Oral daily  sodium chloride 0.45%. 1000 milliLiter(s) (75 mL/Hr) IV Continuous <Continuous>  sodium chloride 0.9%. 1000 milliLiter(s) (75 mL/Hr) IV Continuous <Continuous>  sodium chloride 0.9%. 500 milliLiter(s) (250 mL/Hr) IV Continuous <Continuous>    MEDICATIONS  (PRN):  acetaminophen   Tablet. 650 milliGRAM(s) Oral every 6 hours PRN Mild Pain (1 - 3)  heparin  Injectable 5500 Unit(s) IV Push every 6 hours PRN For aPTT less than 40  heparin  Injectable 2500 Unit(s) IV Push every 6 hours PRN For aPTT between 40 - 57  ondansetron Injectable 4 milliGRAM(s) IV Push every 6 hours PRN Nausea    PHYSICAL EXAM  Vital Signs Last 24 Hrs  T(C): 36.7 (16 Jan 2018 16:24), Max: 36.7 (15 Clemente 2018 20:50)  T(F): 98 (16 Jan 2018 16:24), Max: 98 (15 Clemente 2018 20:50)  HR: 91 (16 Jan 2018 16:24) (76 - 91)  BP: 132/64 (16 Jan 2018 16:24) (117/61 - 158/69)  BP(mean): --  RR: 18 (16 Jan 2018 16:24) (18 - 19)  SpO2: 98% (16 Jan 2018 16:24) (98% - 100%)    Constitutional: Pt lying in bed, awake and alert, NAD  HEENT: EOMI, normal hearing, moist mucous membranes  Neck: Soft and supple, no JVD  Respiratory: CTABL, No wheezing, rales or rhonchi  Cardiovascular: S1S2+, RRR, no M/G/R  Gastrointestinal: BS+, soft, NT/ND, no guarding, no rebound  Extremities: LLE swelling, improved. No TTP  Neurological: AAOx3, no focal deficits  Musculoskeletal: 5/5 strength b/l upper and lower extremities  Skin: No rashes    LABS: All Labs Reviewed:                        9.5    15.4  )-----------( 720      ( 16 Jan 2018 04:58 )             29.4     16 Jan 2018 04:59    139    |  109    |  34     ----------------------------<  95     4.4     |  23     |  1.11     Ca    7.9        16 Jan 2018 04:59      PT/INR - ( 16 Jan 2018 04:59 )   PT: 12.5 sec;   INR: 1.15 ratio         PTT - ( 16 Jan 2018 04:59 )  PTT:77.7 sec  CAPILLARY BLOOD GLUCOSE      Blood Culture: 01-12 @ 21:35  Organism --  Gram Stain Blood -- Gram Stain --  Specimen Source .Blood None  Culture-Blood --    01-12 @ 21:29  Organism --  Gram Stain Blood -- Gram Stain --  Specimen Source .Blood None  Culture-Blood --    01-12 @ 21:07  Organism --  Gram Stain Blood -- Gram Stain --  Specimen Source .Urine None  Culture-Blood --    RADS  < from: CT Head No Cont (01.12.18 @ 19:29) >  Impression:    Age related involutional and microvascular ischemic changes, without   evidence of intracranial hemorrhage, mass effect or midline shift.    < end of copied text >  < from: CT Abdomen and Pelvis No Cont (01.12.18 @ 23:08) >  IMPRESSION:     Splenomegalymeasuring 14 cm in cc dimension x 8 cm in TR dimension.      No evidence of pneumonia. Reidentified is a semisolid semigroundglass   opacity in the posterior basilar right lower lobe with surrounding   scarring and traction bronchiectasis, nonspecific, unchanged since prior   exam, neoplasm cannot be entirely excluded as previously discussed. 8 mm   nodule in the right lower lobe, unchanged since prior exam of 1 month   prior.     Multilevel advanced degenerative changes of the thoracic and lumbar spine   with severe central spinal canal stenosis at L2/L3-L5/S1 and multilevel   severe neural foraminal stenosis of the lumbar spine.  Advanced   degenerative changes of the bilateral hips.    < end of copied text >  < from: US Duplex Venous Lower Ext Ltd, Left (01.13.18 @ 15:12) >  IMPRESSION:   There is poor compression and poor flow within the left   common femoral vein, left superficial femoral vein, and left popliteal   vein, compatible with nonocclusive deep vein thrombus.     < end of copied text >    < from: EEG (01.15.18 @ 08:30) >  Impression: Abnormal EEG because of mild slowing of the background   activity, consistent with a diffuse cerebral dysfunction, maybe on the   basilar diffuse metabolic, toxic or structural abnormality. Clinical   correlation recommended.    < end of copied text >

## 2018-01-16 NOTE — PROGRESS NOTE ADULT - ATTENDING COMMENTS
Patient seen and examined with Jose Wing, Jaime Manrique, Pharmacy Resident Joaquín and MS Lea on the Family Medicine Teaching Service.  Agree with history, physical, labs and plan which were reviewed in detail.

## 2018-01-17 ENCOUNTER — TRANSCRIPTION ENCOUNTER (OUTPATIENT)
Age: 83
End: 2018-01-17

## 2018-01-17 VITALS
OXYGEN SATURATION: 94 % | HEART RATE: 76 BPM | SYSTOLIC BLOOD PRESSURE: 135 MMHG | DIASTOLIC BLOOD PRESSURE: 62 MMHG | TEMPERATURE: 98 F

## 2018-01-17 LAB
ANION GAP SERPL CALC-SCNC: 6 MMOL/L — SIGNIFICANT CHANGE UP (ref 5–17)
APTT BLD: 35.4 SEC — SIGNIFICANT CHANGE UP (ref 27.5–37.4)
BUN SERPL-MCNC: 33 MG/DL — HIGH (ref 7–23)
CALCIUM SERPL-MCNC: 8.3 MG/DL — LOW (ref 8.5–10.1)
CHLORIDE SERPL-SCNC: 109 MMOL/L — HIGH (ref 96–108)
CO2 SERPL-SCNC: 25 MMOL/L — SIGNIFICANT CHANGE UP (ref 22–31)
CREAT SERPL-MCNC: 1.25 MG/DL — SIGNIFICANT CHANGE UP (ref 0.5–1.3)
GLUCOSE SERPL-MCNC: 92 MG/DL — SIGNIFICANT CHANGE UP (ref 70–99)
HCT VFR BLD CALC: 28.9 % — LOW (ref 39–50)
HGB BLD-MCNC: 9 G/DL — LOW (ref 13–17)
INR BLD: 1.39 RATIO — HIGH (ref 0.88–1.16)
MCHC RBC-ENTMCNC: 28.3 PG — SIGNIFICANT CHANGE UP (ref 27–34)
MCHC RBC-ENTMCNC: 31.2 GM/DL — LOW (ref 32–36)
MCV RBC AUTO: 90.7 FL — SIGNIFICANT CHANGE UP (ref 80–100)
PLATELET # BLD AUTO: 602 K/UL — HIGH (ref 150–400)
POTASSIUM SERPL-MCNC: 4.2 MMOL/L — SIGNIFICANT CHANGE UP (ref 3.5–5.3)
POTASSIUM SERPL-SCNC: 4.2 MMOL/L — SIGNIFICANT CHANGE UP (ref 3.5–5.3)
PROTHROM AB SERPL-ACNC: 15.1 SEC — HIGH (ref 9.8–12.7)
RBC # BLD: 3.19 M/UL — LOW (ref 4.2–5.8)
RBC # FLD: 20.9 % — HIGH (ref 10.3–14.5)
SODIUM SERPL-SCNC: 140 MMOL/L — SIGNIFICANT CHANGE UP (ref 135–145)
WBC # BLD: 14.3 K/UL — HIGH (ref 3.8–10.5)
WBC # FLD AUTO: 14.3 K/UL — HIGH (ref 3.8–10.5)

## 2018-01-17 PROCEDURE — 99232 SBSQ HOSP IP/OBS MODERATE 35: CPT

## 2018-01-17 RX ORDER — HYDROXYUREA 500 MG/1
2 CAPSULE ORAL
Qty: 0 | Refills: 0 | COMMUNITY
Start: 2018-01-17

## 2018-01-17 RX ORDER — CYCLOBENZAPRINE HYDROCHLORIDE 10 MG/1
1 TABLET, FILM COATED ORAL
Qty: 0 | Refills: 0 | COMMUNITY

## 2018-01-17 RX ORDER — DOCUSATE SODIUM 100 MG
1 CAPSULE ORAL
Qty: 0 | Refills: 0 | COMMUNITY
Start: 2018-01-17

## 2018-01-17 RX ORDER — ASPIRIN/CALCIUM CARB/MAGNESIUM 324 MG
1 TABLET ORAL
Qty: 0 | Refills: 0 | COMMUNITY
Start: 2018-01-17

## 2018-01-17 RX ORDER — APIXABAN 2.5 MG/1
2 TABLET, FILM COATED ORAL
Qty: 0 | Refills: 0 | COMMUNITY
Start: 2018-01-17 | End: 2018-01-24

## 2018-01-17 RX ADMIN — APIXABAN 10 MILLIGRAM(S): 2.5 TABLET, FILM COATED ORAL at 06:37

## 2018-01-17 RX ADMIN — Medication 100 MILLIGRAM(S): at 06:37

## 2018-01-17 RX ADMIN — Medication 81 MILLIGRAM(S): at 11:52

## 2018-01-17 RX ADMIN — HYDROXYUREA 1000 MILLIGRAM(S): 500 CAPSULE ORAL at 11:53

## 2018-01-17 RX ADMIN — Medication 100 MILLIGRAM(S): at 15:00

## 2018-01-17 RX ADMIN — AMLODIPINE BESYLATE 10 MILLIGRAM(S): 2.5 TABLET ORAL at 06:37

## 2018-01-17 NOTE — DISCHARGE NOTE ADULT - HOSPITAL COURSE
89M ww/PMH of polycythemia, HTN, recently admitted for GI bleed Dec 2017, BIBA for recurrent falls. Pt was found at home crawling on the floor and having lost control of his bowels. Patient has no children and lives alone. His closest relative is his niece who lives in [place]. History obtained by EMS and Niece as patient is a poor historian. In the ED, patient was found to have thrombocytosis and Leukocytosis. Patient was treated empirically with Vancomycin and Cefepime.     Patient was diagnosed with Myeloproliferative Disorder and found to have left DVT.  Started on Hydroxyurea and Eliquis.  Patient to take 7 days of Eliquis 10mg BID then 5mg BID for 3 months. Patient being discharged to [facility].  Follow up with Dr. Lau and Dr. Hermosillo.

## 2018-01-17 NOTE — DISCHARGE NOTE ADULT - MEDICATION SUMMARY - MEDICATIONS TO STOP TAKING
I will STOP taking the medications listed below when I get home from the hospital:    cyclobenzaprine 5 mg oral tablet  -- 1 tab(s) by mouth once a day (at bedtime)    pantoprazole 40 mg oral delayed release tablet  -- 1 tab(s) by mouth once a day

## 2018-01-17 NOTE — PROGRESS NOTE ADULT - PROVIDER SPECIALTY LIST ADULT
Cardiology
Family Medicine
Heme/Onc
Heme/Onc
Hospitalist
Neurology
Family Medicine

## 2018-01-17 NOTE — DISCHARGE NOTE ADULT - MEDICATION SUMMARY - MEDICATIONS TO TAKE
I will START or STAY ON the medications listed below when I get home from the hospital:    aspirin 81 mg oral delayed release tablet  -- 1 tab(s) by mouth once a day  -- Indication: For Cardioprotective    apixaban 5 mg oral tablet  -- 2 tab(s) by mouth every 12 hours.    TAKE 10mg BID for 7 days.  Continue with 5mg BID from 1/24/18 for 3 months for DVT.  -- Indication: For Deep Vein Thrombosis    Eliquis 5 mg oral tablet  -- 1 tab(s) by mouth every 12 hours.    Start taking 5mg BID for 3 months after 10mg BID for 7 days for DVT.  -- Indication: For Deep Vein Thrombosis    hydroxyurea 500 mg oral capsule  -- 2 cap(s) by mouth once a day  -- Indication: For Myeloproliferative Disorder    amLODIPine 10 mg oral tablet  -- 1 tab(s) by mouth once a day  -- Indication: For Hypertension    docusate sodium 100 mg oral capsule  -- 1 cap(s) by mouth 3 times a day  -- Indication: For constipation    folic acid 1 mg oral tablet  -- 1 tab(s) by mouth once a day  -- Indication: For Myeloproliferative Disorder

## 2018-01-17 NOTE — DISCHARGE NOTE ADULT - CARE PLAN
Principal Discharge DX:	Altered mental status  Goal:	control  Assessment and plan of treatment:	- Likely secondary to metabolic encephalopathy and/or age related changes  - Patient to be discharged to skilled nursing facility ([facility])  Secondary Diagnosis:	Myeloproliferative disorder  Assessment and plan of treatment:	- Continue Hydroxyurea  - Follow up with Dr. Lau (Heme/onc)  Secondary Diagnosis:	HTN (hypertension)  Assessment and plan of treatment:	- Continue taking Amlodipine  Secondary Diagnosis:	DVT (deep venous thrombosis)  Assessment and plan of treatment:	- Take Eliquis 10mg twice daily for 7 days  - Start taking Eliquis 5mg twice daily starting 1/24/2018 for 3 months  - Follow up with Primary Care Doctor

## 2018-01-17 NOTE — DISCHARGE NOTE ADULT - CARE PROVIDER_API CALL
Mohit Hermosillo), Cardiovascular Disease  [address] [place] [address]  Phone: [phone]  Fax: [phone]    Malena Renee), Hematology; Medical Oncology  [address] [facility] [facility] [place] [address]  Phone: [phone]  Fax: [phone]

## 2018-01-17 NOTE — PROGRESS NOTE ADULT - ASSESSMENT
89M ww/PMH of polycythemia, HTN, recently admitted for GI bleed Dec 2017, BIBA for recurrent falls. Tentative plan for D/C to rehab, followed by long term care, discussed w/ SW and family.     #Recurrent Falls  - CTs as above  - CT Head: Age-Related Changes  - CT C-Spine: No fracture of Subluxation  - CT T-Spine, L-Spine: Multilevel advanced degenerative changes of the thoracic and lumbar spine with severe central spinal canal stenosis at L2/L3-L5/S1 and multilevel severe neural foraminal stenosis of the lumbar spine. Advanced degenerative changes of the bilateral hips.  - Pelvic Xray: No Acute Fracture  - PT consult appreciated   - SW consult appreciated    #DVT  - LLE Doppler as above: Non-occlusive Thrombus of the Left Common Femoral Vain, Left superficial Femoral Vein, and Left Popliteal  - Continue Heparin gtt for AC; Monitor for signs of bleeding  - MD spoke to family (Wendie); aware of and consent to D/C on AC, discussed R/B/A.  -D/C on AC    #AMS  - Likely secondary to metabolic encephalopathy  - CT Head: Age-related Changes  - Neurology Consult appreciated  - B12: 886, TSH: 3.17  - EEG as above, significant for diffuse cerebral dysfunction      #HTN  - Controlled  - Continue Norvasc    #Myeloproliferative Dx  - Leukocytosis and Thrombocytosis  - Previously taking Hydroxyurea however was discontinued on previous admission secondary to GI bleed  - Heme/Onc consult appreciated,  - Continue Hydroxyurea 1000mg daily    #Leukocytosis  - DDX: Myeloproliferative vs Stress vs ?Infectious  - Blood Culture: NGTD  - Urine Culture: NGTD  - S/P Cefepime  - Monitor WBC off antibiotics    #JOSE on ?CKD  - Gentle IV Hydration  - Trend Creatinine    #RLL Opacity concerns for Malignancy  - CT Chest (12/17): 1.9 cm RLL Opacity with concerns over potential malignancy  - Outpatient F/U    #DVT  - Hep Gtt
89 year old elderly male with hypertension ,hypertensive heart disease , osteoarthritis ,     who apparently  crawling on floor ? fall vs due left knee pain , doubt syncope , dehydration ,       LLE DVT : hemodynamically stable , borderline elevated troponin in setting of abnormal BUN creatinine ,DVT ? PE ,will obtain echocardiogram , IV hydration , need CT angio chest once renal function improves , IV fluids , repeat BMP , continue IV heparin     Elevated troponin   borderline , possibly due to demand ischemia  in setting of left ventricular hypertrophy ,elevated bun and creatinine , ? sepsis , vs ? PE   will give  ecotrin ,  medical management    follow up of cultures     stable abnormal EKG     HTN hypertensive heart disease : controlled , continue current medication ,    Valvular disease   moderate MR  Mild AI , monitor
89 year old elderly male with hypertension ,hypertensive heart disease , osteoarthritis ,     who apparently  crawling on floor ? fall vs due left knee pain , doubt syncope , dehydration ,       LLE DVT : hemodynamically stable , borderline elevated troponin in setting of abnormal BUN creatinine ,DVT ? PE ,will obtain echocardiogram , IV hydration , need CT angio chest once renal function improves , IV fluids , repeat BMP , continue IV heparin , start on warfarin if patient will be going to skilled nursing facility , with supervision , where PT INR can be followed and adjust the warfarin dose , benefit outweigh the risk of bleed     Elevated troponin   borderline , possibly due to demand ischemia  in setting of left ventricular hypertrophy ,elevated bun and creatinine , ? sepsis ,   will give  ecotrin ,  medical management        stable abnormal EKG     HTN hypertensive heart disease : controlled , continue current medication ,    Valvular disease   moderate MR  Mild AI , monitor
89 year old elderly male with hypertension ,hypertensive heart disease , osteoarthritis , brought in as he was apparently found crawling on floor by wheels on meals; pt has poor recollection of the event.    At present he is well oriented to time/place, but is confused about recent past events.    # Encephalopathy/ syncope / fall;    # Fall urinary incontinence - unwitnessed; seizure is a remote possibility    # Possibility of underlying mild cognitive dysfunction    - EEG  - physical therapy   - will need cognitive testing as OP
89 year old male with past medical history of polycythemia, HTN, recently admitted for GIB (12/17), BIBA for recurrent falls    #Recurrent Falls  - CT Head: Age-Related Changes  - CT C-Spine: No fracture of Subluxation  - CT T-Spine, L-Spine: Multilevel advanced degenerative changes of the thoracic and lumbar spine with severe central spinal canal stenosis at L2/L3-L5/S1 and multilevel severe neural foraminal stenosis of the lumbar spine. Advanced degenerative changes of the bilateral hips.  - Pelvic Xray: No Acute Fracture  - PT Consult  - SW Consult    #DVT  - LLE Doppler: Non-occlusive Thrombus of the Left Common Femoral Vain, Left superficial Femoral Vein, and Left Popliteal  - Continue Heparin gtt for AC; Monitor for signs of bleeding  - Will require long-term oral AC however concerns initiating oral AC secondary history of recurrent falls, History of GIB, and poor social support. Will discuss further with family and SW.    #AMS  - Likely secondary to metabolic encephalopathy  - CT Head: Age-related Changes  - Neurology Consult appreciated  - W89=393, TSH=3.17  - Follow-up EEG    #HTN  - BP Controlled  - Continue Norvasc    #Myeloproliferative Dx  - Leukocytosis and Thrombocytosis  - Previously taking Hydroxyurea however was discontinued on previous admission secondary to GIB  - Heme/Onc Consult appreciated,  - Continue Hydroxyurea 1000mg daily    #Leukocytosis  - DDX: Myeloproliferative vs Stress vs ?Infectious  - Blood Culture: NGTD  - Urine Culture: NGTD  - s/p Cefepime  - Monitor WBC off antibiotics    #JOSE on ?CKD  - Gentle IV Hydration  - Trend Creatinine    #RLL Opacity concerns for Malignancy  - CT Chest (12/17): 1.9 cm RLL Opacity with concerns over potential malignancy  - Outpatient follow-up    #DVT  - Hep Gtt
89 year old male with past medical history of polycythemia, HTN, recently admitted for GIB (12/17), BIBA for recurrent falls    #Recurrent Falls  - CT Head: Age-Related Changes  - CT C-Spine: No fracture of Subluxation  - Order CT T-Spine, L-Spine  - Follow-up Pelvic Xray  - PT Consult  - SW Consult    #AMS  - Likely secondary to metabolic encephalopathy  - CT Head: Age-related Changes  - Neurology Consult appreciated, EEG  - B12, TSH    #HTN  - BP Controlled  - Continue Norvasc    #Myeloproliferative Dx  - Leukocytosis and Thrombocytosis  - Previously taking Hydroxyurea however was discontinued on previous admission secondary to GIB  - Heme/Onc Consult appreciated, Hydroxyurea restarted, LD [id number] restarted, but held 2/2 hep gtt    #Leukocytosis  - DDX: Myeloproliferative vs Stress vs Infectious  - Continue Cefepime 500mg q24 #1  - Follow-up Blood Cultures, Urine Cultures    #JOSE on ?CKD  - Gentle IV Hydration  - Trend Creatinine    #RLL Opacity concerns for Malignancy  - CT Chest (12/17): 1.9 cm RLL Opacity with concerns over potential malignancy  - Repeat Chest CT  - Outpatient follow-up    #DVT  -+ve Doppler Studies  -aptt STAT  -hep gtt  -monitor aptt  -monitor CBC closely, PMHx of GI Bleed  -hold asa 81mg for now, risk of GI Bleed    #Hx of GI Bleed  -Consult GI
89M w/PMH of polycythemia, HTN, recently admitted for GI bleed Dec 2017, BIBA for recurrent falls. D/C to rehab today    #Recurrent Falls  - CTs as above  - CT Head: Age-Related Changes  - CT C-Spine: No fracture of Subluxation  - CT T-Spine, L-Spine: Multilevel advanced degenerative changes of the thoracic and lumbar spine with severe central spinal canal stenosis at L2/L3-L5/S1 and multilevel severe neural foraminal stenosis of the lumbar spine. Advanced degenerative changes of the bilateral hips.  - Pelvic Xray: No Acute Fracture  - PT consult appreciated   - SW consult appreciated    #DVT  - LLE Doppler as above: Non-occlusive Thrombus of the Left Common Femoral Vain, Left superficial Femoral Vein, and Left Popliteal  - MD spoke to family (Wendie); aware of and consent to D/C on AC, discussed R/B/A.  - Heparin gtt D/C'd, pt started on Eliquis  - D/C on Eliquis    #AMS  - Likely secondary to metabolic encephalopathy  - CT Head: Age-related Changes  - Neurology Consult appreciated  - B12: 886, TSH: 3.17  - EEG as above, significant for diffuse cerebral dysfunction      #HTN  - Controlled  - Continue Norvasc    #Myeloproliferative Dx  - Leukocytosis and Thrombocytosis  - Previously taking Hydroxyurea however was discontinued on previous admission secondary to GI bleed  - Heme/Onc consult appreciated,  - Continue Hydroxyurea 1000mg daily    #Leukocytosis  - DDX: Myeloproliferative vs Stress vs ?Infectious  - Blood Culture: NGTD  - Urine Culture: NGTD  - S/P Cefepime    #JOSE on ?CKD  - Gentle IV Hydration  - Trend Creatinine    #RLL Opacity concerns for Malignancy  - CT Chest (12/17): 1.9 cm RLL Opacity with concerns over potential malignancy  - Outpatient F/U    #DVT  - Eliquis
89M ww/PMH of polycythemia, HTN, recently admitted for GI bleed Dec 2017, BIBA for recurrent falls.     #Recurrent Falls  - CTs as above  - CT Head: Age-Related Changes  - CT C-Spine: No fracture of Subluxation  - CT T-Spine, L-Spine: Multilevel advanced degenerative changes of the thoracic and lumbar spine with severe central spinal canal stenosis at L2/L3-L5/S1 and multilevel severe neural foraminal stenosis of the lumbar spine. Advanced degenerative changes of the bilateral hips.  - Pelvic Xray: No Acute Fracture  - PT consult appreciated   - SW consult appreciated    #DVT  - LLE Doppler as above: Non-occlusive Thrombus of the Left Common Femoral Vain, Left superficial Femoral Vein, and Left Popliteal  - Continue Heparin gtt for AC; Monitor for signs of bleeding  - Will require long-term oral AC however concerns initiating oral AC secondary history of recurrent falls, History of GIB, and poor social support. Will discuss further with family and SW.    #AMS  - Likely secondary to metabolic encephalopathy  - CT Head: Age-related Changes  - Neurology Consult appreciated  - B12: 886, TSH: 3.17  - Follow-up EEG    #HTN  - Controlled  - Continue Norvasc    #Myeloproliferative Dx  - Leukocytosis and Thrombocytosis  - Previously taking Hydroxyurea however was discontinued on previous admission secondary to GI bleed  - Heme/Onc consult appreciated,  - Continue Hydroxyurea 1000mg daily    #Leukocytosis  - DDX: Myeloproliferative vs Stress vs ?Infectious  - Blood Culture: NGTD  - Urine Culture: NGTD  - S/P Cefepime  - Monitor WBC off antibiotics    #JOSE on ?CKD  - Gentle IV Hydration  - Trend Creatinine    #RLL Opacity concerns for Malignancy  - CT Chest (12/17): 1.9 cm RLL Opacity with concerns over potential malignancy  - Outpatient F/U    #DVT  - Hep Gtt
89 y/o male with long history of Myeloproliferative disorder, on Hydrea.  In December Hydrea held due to pancytopenia in setting of GIB.  Now platelets rising again ( in the past his platelets raised to over one million when he run out of Hydrea ).   He restarted Hydrea on Jan 9 ( at home,  prior to this admission).    Continue Hydrea 2 tablets ( 1000 mg daily).   Platelets started to trend down.  ASA d/c due to heparin tx.   Leukocytosis- due to myeloproliferative disorder.     Non occlusive LLE DVT- needs AC but at risk for recurrent GI bleed.  On iv heparin- no evidence of bleeding. Consider transition to Coumadin,. probably he will need to be discharged to SNF.
89 y/o male with long history of Myeloproliferative disorder, on Hydrea.  In December Hydrea held due to pancytopenia in setting of GIB.  Now platelets rising again ( in the past his platelets raised to over one million when he run out of Hydrea ).   He restarted Hydrea on Jan 9 ( at home,  prior to this admission).    Continue Hydrea 2 tablets ( 1000 mg daily).  Platelets should improve in a few days. ASA d/c due to heparin tx.   Leukocytosis- probably a combination of MPD, stress leukocytosis/ dehydration/ ? infection. . Empiric antibiotics until cultures neg ( although no clinical symptoms ).   Confusion- likely metabolic encephalopathy- improved.    Non occlusive LLE DVT- needs AC but at risk for recurrent GI bleed, On iv heparin- next Coumadin if no bleeding.    Social service evaluation.
Assessment & Plan:    DVT: Prescribed Eliquis by hospitalist service; tolerating anticoagulation.    Aortic stenosis:  Moderate in severity; no intervention indicated.    Abnormal ECG:  Right bundle brach block; pre-existing.    Hypertensive heart disease: BP is controlled; continue amlodipine.
Assessment & Plan:    DVT: Tolerating IV UF Heparin; would start warfarin    Aortic stenosis:  I reviewed TTE images; patient has moderate aortic stenosis, normal LV systolic function, mild LV diastolic dysfunction.    Abnormal ECG:  Right bundle brach block; pre-existing.    Hypertensive heart disease: BP is controlled; continue amlodipine.    ** Stable cardiac status; d/c telemetry

## 2018-01-17 NOTE — PROGRESS NOTE ADULT - ATTENDING COMMENTS
Patient seen and examined with Jose Wing, Ced Zapata, Jaime Manrique, Pharmacy Resident Joaquín and MS Lea on the Family Medicine Teaching Service.  Agree with history, physical, labs and plan which were reviewed in detail.

## 2018-01-17 NOTE — DISCHARGE NOTE ADULT - PLAN OF CARE
- Continue Hydroxyurea  - Follow up with Dr. Lau (Heme/onc) - Continue taking Amlodipine - Take Eliquis 10mg twice daily for 7 days  - Start taking Eliquis 5mg twice daily starting 1/24/2018 for 3 months  - Follow up with Primary Care Doctor control - Likely secondary to metabolic encephalopathy and/or age related changes  - Patient to be discharged to skilled nursing facility ([facility])

## 2018-01-17 NOTE — DISCHARGE NOTE ADULT - PATIENT PORTAL LINK FT
“You can access the Follow[facility] Patient Portal, offered by [facility], by registering with the following website: [email or web address]”

## 2018-01-17 NOTE — PROGRESS NOTE ADULT - SUBJECTIVE AND OBJECTIVE BOX
REASON FOR VISIT: Abnormal ECG, aortic stenosis    HPI:  89 year old man with myeloproliferative disorder, HTN, recent hospitalization for GI bleed, admitted 1/12/18 with encephalopathy, left lower extremity DVT.    1/16/18:  Comfortable and without new complaint; less LLE pain/edema.  Denies: dyspnea, chest pain, palpitations.  1/17/18:  No cardiac complaints -- denies: SOB, CP; wants to return home.    MEDICATIONS  (STANDING):  amLODIPine   Tablet 10 milliGRAM(s) Oral daily  apixaban 10 milliGRAM(s) Oral every 12 hours  aspirin enteric coated 81 milliGRAM(s) Oral daily  docusate sodium 100 milliGRAM(s) Oral three times a day  hydroxyurea 1000 milliGRAM(s) Oral daily  sodium chloride 0.45%. 1000 milliLiter(s) (75 mL/Hr) IV Continuous <Continuous>  sodium chloride 0.9%. 1000 milliLiter(s) (75 mL/Hr) IV Continuous <Continuous>  sodium chloride 0.9%. 500 milliLiter(s) (250 mL/Hr) IV Continuous <Continuous>    MEDICATIONS  (PRN):  acetaminophen   Tablet. 650 milliGRAM(s) Oral every 6 hours PRN Mild Pain (1 - 3)  ondansetron Injectable 4 milliGRAM(s) IV Push every 6 hours PRN Nausea    Vital Signs Last 24 Hrs  T(C): 36.8 (17 Jan 2018 06:35), Max: 36.8 (17 Jan 2018 06:35)  T(F): 98.3 (17 Jan 2018 06:35), Max: 98.3 (17 Jan 2018 06:35)  HR: 72 (17 Jan 2018 06:35) (70 - 91)  BP: 128/56 (17 Jan 2018 06:35) (93/74 - 132/64)  RR: 16 (17 Jan 2018 06:35) (16 - 18)  SpO2: 98% (17 Jan 2018 06:35) (98% - 100%)    PHYSICAL EXAM:  Constitutional: Appears stated age, no distress, awake and alert  Respiratory: Breath sounds are clear bilaterally, No wheezing, rales or rhonchi  Cardiovascular: S1 and S2, regular rate and rhythm, II/VI systolic murmur  Gastrointestinal: Bowel Sounds present, soft, nontender.   Skin: No rash.  Psych:  Mood and affect appropriate.    LABS:                            9.0    14.3  )-----------( 602      ( 17 Jan 2018 06:00 )             28.9     140  |  109<H>  |  33<H>  ----------------------------<  92  4.2   |  25  |  1.25    12 Lead ECG (01.14.18 @ 06:57) >  Sinus rhythm with occasional premature ventricular complexes and Premature atrial complexes.  Left axis deviation.  Right bundle branch block.  T wave abnormality, consider inferolateral ischemia.

## 2018-01-17 NOTE — PROGRESS NOTE ADULT - SUBJECTIVE AND OBJECTIVE BOX
89M ww/PMH of polycythemia, HTN, recently admitted for GI bleed Dec 2017, BIBA for recurrent falls. Pt was found at home crawling on the floor and having lost control of his bowels. Patient has no children and lives alone. His closest relative is his niece who lives in [place]. History obtained by EMS and Niece as patient is a poor historian. In the ED, patient was found to have thrombocytosis and Leukocytosis. Patient was treated empirically with Vancomycin and Cefepime.     1/15/18: Pt seen and evaluated at bedside. Pt has no complaints at this time and was able to keep train of thought. Patient tolerating Heparin gtt well without acute signs of bleeding. Will require long-term oral AC however concerns initiating oral AC secondary history of recurrent falls, history of GI bleed, and poor social support. Will discuss w/ family AC and dispo.     1/16/18: Pt seen and evaluated at bedside. Pt has no complaints, continues to tolerate heparin gtt well w/o signs of bleeding. Mental status improving. Tentative plan for D/C to rehab, followed by long term care discussed w/ SW and family. Family also aware of and consent to D/C on AC, discussed R/B/A.     1/17/18: Yesterday, heparin gtt was D/C, pt started on Eliquis. Pt seen and evaluated at bedside. Pt has no complaints. L leg swelling resolved. During rounding today, pt could not recall yesterday's conversation regarding D/C to rehab and then long term care. Pt to be D/C to rehab today.     ROS  Unable to obtain due to pt status    MEDICATIONS  (STANDING):  amLODIPine   Tablet 10 milliGRAM(s) Oral daily  apixaban 10 milliGRAM(s) Oral every 12 hours  aspirin enteric coated 81 milliGRAM(s) Oral daily  docusate sodium 100 milliGRAM(s) Oral three times a day  hydroxyurea 1000 milliGRAM(s) Oral daily  sodium chloride 0.45%. 1000 milliLiter(s) (75 mL/Hr) IV Continuous <Continuous>  sodium chloride 0.9%. 1000 milliLiter(s) (75 mL/Hr) IV Continuous <Continuous>  sodium chloride 0.9%. 500 milliLiter(s) (250 mL/Hr) IV Continuous <Continuous>    MEDICATIONS  (PRN):  acetaminophen   Tablet. 650 milliGRAM(s) Oral every 6 hours PRN Mild Pain (1 - 3)  ondansetron Injectable 4 milliGRAM(s) IV Push every 6 hours PRN Nausea    PHYSICAL EXAM  Vital Signs Last 24 Hrs  T(C): 36.6 (17 Jan 2018 10:00), Max: 36.8 (17 Jan 2018 06:35)  T(F): 97.8 (17 Jan 2018 10:00), Max: 98.3 (17 Jan 2018 06:35)  HR: 80 (17 Jan 2018 10:00) (70 - 91)  BP: 123/56 (17 Jan 2018 10:00) (93/74 - 132/64)  BP(mean): --  RR: 18 (17 Jan 2018 10:00) (16 - 18)  SpO2: 99% (17 Jan 2018 10:00) (98% - 100%)    Constitutional: Pt lying in bed, awake and alert, NAD  HEENT: EOMI, normal hearing, moist mucous membranes  Neck: Soft and supple, no JVD  Respiratory: CTABL, No wheezing, rales or rhonchi  Cardiovascular: S1S2+, RRR, no M/G/R  Gastrointestinal: BS+, soft, NT/ND, no guarding, no rebound  Extremities: LLE swelling resolved. No TTP  Neurological: AAOx3, no focal deficits  Musculoskeletal: 5/5 strength b/l upper and lower extremities  Skin: No rashes    LABS: All Labs Reviewed:                        9.0    14.3  )-----------( 602      ( 17 Jan 2018 06:00 )             28.9     17 Jan 2018 06:00    140    |  109    |  33     ----------------------------<  92     4.2     |  25     |  1.25     Ca    8.3        17 Jan 2018 06:00      PT/INR - ( 17 Jan 2018 06:00 )   PT: 15.1 sec;   INR: 1.39 ratio         PTT - ( 17 Jan 2018 06:00 )  PTT:35.4 sec  CAPILLARY BLOOD GLUCOSE      Blood Culture: 01-12 @ 21:35  Organism --  Gram Stain Blood -- Gram Stain --  Specimen Source .Blood None  Culture-Blood --    01-12 @ 21:29  Organism --  Gram Stain Blood -- Gram Stain --  Specimen Source .Blood None  Culture-Blood --    01-12 @ 21:07  Organism --  Gram Stain Blood -- Gram Stain --  Specimen Source .Urine None  Culture-Blood --    RADS  < from: CT Head No Cont (01.12.18 @ 19:29) >  Impression:    Age related involutional and microvascular ischemic changes, without   evidence of intracranial hemorrhage, mass effect or midline shift.    < end of copied text >  < from: CT Abdomen and Pelvis No Cont (01.12.18 @ 23:08) >  IMPRESSION:     Splenomegalymeasuring 14 cm in cc dimension x 8 cm in TR dimension.      No evidence of pneumonia. Reidentified is a semisolid semigroundglass   opacity in the posterior basilar right lower lobe with surrounding   scarring and traction bronchiectasis, nonspecific, unchanged since prior   exam, neoplasm cannot be entirely excluded as previously discussed. 8 mm   nodule in the right lower lobe, unchanged since prior exam of 1 month   prior.     Multilevel advanced degenerative changes of the thoracic and lumbar spine   with severe central spinal canal stenosis at L2/L3-L5/S1 and multilevel   severe neural foraminal stenosis of the lumbar spine.  Advanced   degenerative changes of the bilateral hips.    < end of copied text >  < from: US Duplex Venous Lower Ext Ltd, Left (01.13.18 @ 15:12) >  IMPRESSION:   There is poor compression and poor flow within the left   common femoral vein, left superficial femoral vein, and left popliteal   vein, compatible with nonocclusive deep vein thrombus.     < end of copied text >    < from: EEG (01.15.18 @ 08:30) >  Impression: Abnormal EEG because of mild slowing of the background   activity, consistent with a diffuse cerebral dysfunction, maybe on the   basilar diffuse metabolic, toxic or structural abnormality. Clinical   correlation recommended.    < end of copied text >

## 2018-01-17 NOTE — DISCHARGE NOTE ADULT - CARE PROVIDERS DIRECT ADDRESSES
[email or web address]

## 2018-01-18 LAB
CULTURE RESULTS: SIGNIFICANT CHANGE UP
CULTURE RESULTS: SIGNIFICANT CHANGE UP
SPECIMEN SOURCE: SIGNIFICANT CHANGE UP
SPECIMEN SOURCE: SIGNIFICANT CHANGE UP

## 2018-01-23 ENCOUNTER — EMERGENCY (EMERGENCY)
Facility: HOSPITAL | Age: 83
LOS: 0 days | Discharge: HOME | End: 2018-01-23
Attending: EMERGENCY MEDICINE | Admitting: EMERGENCY MEDICINE
Payer: MEDICARE

## 2018-01-23 VITALS
TEMPERATURE: 98 F | RESPIRATION RATE: 18 BRPM | SYSTOLIC BLOOD PRESSURE: 121 MMHG | HEART RATE: 69 BPM | DIASTOLIC BLOOD PRESSURE: 68 MMHG | OXYGEN SATURATION: 100 %

## 2018-01-23 VITALS
DIASTOLIC BLOOD PRESSURE: 54 MMHG | TEMPERATURE: 98 F | RESPIRATION RATE: 18 BRPM | SYSTOLIC BLOOD PRESSURE: 122 MMHG | OXYGEN SATURATION: 99 % | HEART RATE: 60 BPM

## 2018-01-23 DIAGNOSIS — I10 ESSENTIAL (PRIMARY) HYPERTENSION: ICD-10-CM

## 2018-01-23 DIAGNOSIS — Z98.890 OTHER SPECIFIED POSTPROCEDURAL STATES: Chronic | ICD-10-CM

## 2018-01-23 DIAGNOSIS — R25.1 TREMOR, UNSPECIFIED: ICD-10-CM

## 2018-01-23 DIAGNOSIS — Z76.89 PERSONS ENCOUNTERING HEALTH SERVICES IN OTHER SPECIFIED CIRCUMSTANCES: ICD-10-CM

## 2018-01-23 DIAGNOSIS — D75.1 SECONDARY POLYCYTHEMIA: ICD-10-CM

## 2018-01-23 DIAGNOSIS — Z98.890 OTHER SPECIFIED POSTPROCEDURAL STATES: ICD-10-CM

## 2018-01-23 DIAGNOSIS — R79.9 ABNORMAL FINDING OF BLOOD CHEMISTRY, UNSPECIFIED: ICD-10-CM

## 2018-01-23 DIAGNOSIS — D64.9 ANEMIA, UNSPECIFIED: ICD-10-CM

## 2018-01-23 LAB
ALBUMIN SERPL ELPH-MCNC: 2.8 G/DL — LOW (ref 3.3–5)
ALP SERPL-CCNC: 68 U/L — SIGNIFICANT CHANGE UP (ref 40–120)
ALT FLD-CCNC: 18 U/L — SIGNIFICANT CHANGE UP (ref 12–78)
ANION GAP SERPL CALC-SCNC: 9 MMOL/L — SIGNIFICANT CHANGE UP (ref 5–17)
APTT BLD: 37 SEC — SIGNIFICANT CHANGE UP (ref 27.5–37.4)
AST SERPL-CCNC: 15 U/L — SIGNIFICANT CHANGE UP (ref 15–37)
BILIRUB SERPL-MCNC: 0.3 MG/DL — SIGNIFICANT CHANGE UP (ref 0.2–1.2)
BLD GP AB SCN SERPL QL: SIGNIFICANT CHANGE UP
BUN SERPL-MCNC: 46 MG/DL — HIGH (ref 7–23)
CALCIUM SERPL-MCNC: 8.3 MG/DL — LOW (ref 8.5–10.1)
CHLORIDE SERPL-SCNC: 105 MMOL/L — SIGNIFICANT CHANGE UP (ref 96–108)
CO2 SERPL-SCNC: 24 MMOL/L — SIGNIFICANT CHANGE UP (ref 22–31)
CREAT SERPL-MCNC: 1.62 MG/DL — HIGH (ref 0.5–1.3)
GLUCOSE SERPL-MCNC: 98 MG/DL — SIGNIFICANT CHANGE UP (ref 70–99)
INR BLD: 1.89 RATIO — HIGH (ref 0.88–1.16)
POTASSIUM SERPL-MCNC: 4.7 MMOL/L — SIGNIFICANT CHANGE UP (ref 3.5–5.3)
POTASSIUM SERPL-SCNC: 4.7 MMOL/L — SIGNIFICANT CHANGE UP (ref 3.5–5.3)
PROT SERPL-MCNC: 6.3 GM/DL — SIGNIFICANT CHANGE UP (ref 6–8.3)
PROTHROM AB SERPL-ACNC: 20.7 SEC — HIGH (ref 9.8–12.7)
SODIUM SERPL-SCNC: 138 MMOL/L — SIGNIFICANT CHANGE UP (ref 135–145)
TYPE + AB SCN PNL BLD: SIGNIFICANT CHANGE UP

## 2018-01-23 PROCEDURE — 99285 EMERGENCY DEPT VISIT HI MDM: CPT | Mod: 25

## 2018-01-23 RX ORDER — SODIUM CHLORIDE 9 MG/ML
3 INJECTION INTRAMUSCULAR; INTRAVENOUS; SUBCUTANEOUS EVERY 8 HOURS
Qty: 0 | Refills: 0 | Status: DISCONTINUED | OUTPATIENT
Start: 2018-01-23 | End: 2018-01-23

## 2018-01-23 NOTE — ED PROVIDER NOTE - MEDICAL DECISION MAKING DETAILS
patient with hemoglobin of 8.2 in the ED. patient not a candidate for blood transfusion at this time. patient currently without complaints. patient with hx of GI bleed, no hx of endoscopy performed. patient appropriate for discharge back to nursing facility with monitoring of H/H, GI outpatient f/u, and transport back to ED if there is an acute change.

## 2018-01-23 NOTE — ED ADULT NURSE NOTE - CHIEF COMPLAINT QUOTE
Pt sent in for abnormal lab value [facility] H&H of 7.9/22.9

## 2018-01-23 NOTE — ED PROVIDER NOTE - OBJECTIVE STATEMENT
89y/o M with PMHx of HTN, blood in stool, back surgery sent in to ED for transfusion after abnormal lab value from [facility] H&H of 7.9/22.9. Pt denies Dizziness, NVD, chills, fever, LOC, tired, abd pain, CP and has No complaints at this time.

## 2018-01-23 NOTE — ED ADULT NURSE NOTE - CHPI ED SYMPTOMS NEG
no vomiting/no dizziness/no decreased eating/drinking/no chills/no tingling/no pain/no numbness/no nausea/no fever/no weakness

## 2018-01-24 DIAGNOSIS — D47.3 ESSENTIAL (HEMORRHAGIC) THROMBOCYTHEMIA: ICD-10-CM

## 2018-01-24 DIAGNOSIS — M51.36 OTHER INTERVERTEBRAL DISC DEGENERATION, LUMBAR REGION: ICD-10-CM

## 2018-01-24 DIAGNOSIS — N18.9 CHRONIC KIDNEY DISEASE, UNSPECIFIED: ICD-10-CM

## 2018-01-24 DIAGNOSIS — I08.0 RHEUMATIC DISORDERS OF BOTH MITRAL AND AORTIC VALVES: ICD-10-CM

## 2018-01-24 DIAGNOSIS — C94.6 MYELODYSPLASTIC DISEASE, NOT ELSEWHERE CLASSIFIED: ICD-10-CM

## 2018-01-24 DIAGNOSIS — I82.412 ACUTE EMBOLISM AND THROMBOSIS OF LEFT FEMORAL VEIN: ICD-10-CM

## 2018-01-24 DIAGNOSIS — R32 UNSPECIFIED URINARY INCONTINENCE: ICD-10-CM

## 2018-01-24 DIAGNOSIS — M62.82 RHABDOMYOLYSIS: ICD-10-CM

## 2018-01-24 DIAGNOSIS — F03.90 UNSPECIFIED DEMENTIA WITHOUT BEHAVIORAL DISTURBANCE: ICD-10-CM

## 2018-01-24 DIAGNOSIS — M51.37 OTHER INTERVERTEBRAL DISC DEGENERATION, LUMBOSACRAL REGION: ICD-10-CM

## 2018-01-24 DIAGNOSIS — R74.0 NONSPECIFIC ELEVATION OF LEVELS OF TRANSAMINASE AND LACTIC ACID DEHYDROGENASE [LDH]: ICD-10-CM

## 2018-01-24 DIAGNOSIS — I82.432 ACUTE EMBOLISM AND THROMBOSIS OF LEFT POPLITEAL VEIN: ICD-10-CM

## 2018-01-24 DIAGNOSIS — N17.9 ACUTE KIDNEY FAILURE, UNSPECIFIED: ICD-10-CM

## 2018-01-24 DIAGNOSIS — G92 TOXIC ENCEPHALOPATHY: ICD-10-CM

## 2018-01-24 DIAGNOSIS — I12.9 HYPERTENSIVE CHRONIC KIDNEY DISEASE WITH STAGE 1 THROUGH STAGE 4 CHRONIC KIDNEY DISEASE, OR UNSPECIFIED CHRONIC KIDNEY DISEASE: ICD-10-CM

## 2018-01-24 DIAGNOSIS — M19.90 UNSPECIFIED OSTEOARTHRITIS, UNSPECIFIED SITE: ICD-10-CM

## 2018-01-24 DIAGNOSIS — E86.0 DEHYDRATION: ICD-10-CM

## 2018-01-24 RX ORDER — APIXABAN 2.5 MG/1
1 TABLET, FILM COATED ORAL
Qty: 0 | Refills: 0 | COMMUNITY
Start: 2018-01-24

## 2018-01-30 ENCOUNTER — EMERGENCY (EMERGENCY)
Facility: HOSPITAL | Age: 83
LOS: 0 days | Discharge: HOME | End: 2018-01-30
Attending: EMERGENCY MEDICINE | Admitting: EMERGENCY MEDICINE
Payer: MEDICARE

## 2018-01-30 VITALS
TEMPERATURE: 98 F | DIASTOLIC BLOOD PRESSURE: 74 MMHG | WEIGHT: 138.45 LBS | SYSTOLIC BLOOD PRESSURE: 123 MMHG | RESPIRATION RATE: 18 BRPM | HEART RATE: 78 BPM | OXYGEN SATURATION: 100 %

## 2018-01-30 VITALS
DIASTOLIC BLOOD PRESSURE: 72 MMHG | HEART RATE: 70 BPM | OXYGEN SATURATION: 100 % | SYSTOLIC BLOOD PRESSURE: 126 MMHG | TEMPERATURE: 98 F | RESPIRATION RATE: 18 BRPM

## 2018-01-30 DIAGNOSIS — I10 ESSENTIAL (PRIMARY) HYPERTENSION: ICD-10-CM

## 2018-01-30 DIAGNOSIS — R25.1 TREMOR, UNSPECIFIED: ICD-10-CM

## 2018-01-30 DIAGNOSIS — R79.9 ABNORMAL FINDING OF BLOOD CHEMISTRY, UNSPECIFIED: ICD-10-CM

## 2018-01-30 DIAGNOSIS — Z98.890 OTHER SPECIFIED POSTPROCEDURAL STATES: Chronic | ICD-10-CM

## 2018-01-30 DIAGNOSIS — Z98.890 OTHER SPECIFIED POSTPROCEDURAL STATES: ICD-10-CM

## 2018-01-30 DIAGNOSIS — Z79.82 LONG TERM (CURRENT) USE OF ASPIRIN: ICD-10-CM

## 2018-01-30 DIAGNOSIS — D75.1 SECONDARY POLYCYTHEMIA: ICD-10-CM

## 2018-01-30 DIAGNOSIS — D64.9 ANEMIA, UNSPECIFIED: ICD-10-CM

## 2018-01-30 DIAGNOSIS — Z79.01 LONG TERM (CURRENT) USE OF ANTICOAGULANTS: ICD-10-CM

## 2018-01-30 DIAGNOSIS — Z86.718 PERSONAL HISTORY OF OTHER VENOUS THROMBOSIS AND EMBOLISM: ICD-10-CM

## 2018-01-30 LAB
ALBUMIN SERPL ELPH-MCNC: 2.8 G/DL — LOW (ref 3.3–5)
ALP SERPL-CCNC: 65 U/L — SIGNIFICANT CHANGE UP (ref 40–120)
ALT FLD-CCNC: 13 U/L — SIGNIFICANT CHANGE UP (ref 12–78)
ANION GAP SERPL CALC-SCNC: 7 MMOL/L — SIGNIFICANT CHANGE UP (ref 5–17)
ANISOCYTOSIS BLD QL: SIGNIFICANT CHANGE UP
APTT BLD: 38.2 SEC — HIGH (ref 27.5–37.4)
AST SERPL-CCNC: 9 U/L — LOW (ref 15–37)
BASOPHILS # BLD AUTO: 0.1 K/UL — SIGNIFICANT CHANGE UP (ref 0–0.2)
BASOPHILS NFR BLD AUTO: 0.6 % — SIGNIFICANT CHANGE UP (ref 0–2)
BILIRUB SERPL-MCNC: 0.2 MG/DL — SIGNIFICANT CHANGE UP (ref 0.2–1.2)
BLD GP AB SCN SERPL QL: SIGNIFICANT CHANGE UP
BUN SERPL-MCNC: 36 MG/DL — HIGH (ref 7–23)
BURR CELLS BLD QL SMEAR: PRESENT — SIGNIFICANT CHANGE UP
CALCIUM SERPL-MCNC: 7.9 MG/DL — LOW (ref 8.5–10.1)
CHLORIDE SERPL-SCNC: 108 MMOL/L — SIGNIFICANT CHANGE UP (ref 96–108)
CO2 SERPL-SCNC: 22 MMOL/L — SIGNIFICANT CHANGE UP (ref 22–31)
CREAT SERPL-MCNC: 1.67 MG/DL — HIGH (ref 0.5–1.3)
DACRYOCYTES BLD QL SMEAR: SLIGHT — SIGNIFICANT CHANGE UP
ELLIPTOCYTES BLD QL SMEAR: SLIGHT — SIGNIFICANT CHANGE UP
EOSINOPHIL # BLD AUTO: 0.4 K/UL — SIGNIFICANT CHANGE UP (ref 0–0.5)
EOSINOPHIL NFR BLD AUTO: 3.2 % — SIGNIFICANT CHANGE UP (ref 0–6)
GIANT PLATELETS BLD QL SMEAR: PRESENT — SIGNIFICANT CHANGE UP
GLUCOSE SERPL-MCNC: 92 MG/DL — SIGNIFICANT CHANGE UP (ref 70–99)
HCT VFR BLD CALC: 25.5 % — LOW (ref 39–50)
HGB BLD-MCNC: 7.9 G/DL — LOW (ref 13–17)
HYPOCHROMIA BLD QL: SLIGHT — SIGNIFICANT CHANGE UP
INR BLD: 1.55 RATIO — HIGH (ref 0.88–1.16)
LG PLATELETS BLD QL AUTO: SLIGHT — SIGNIFICANT CHANGE UP
LYMPHOCYTES # BLD AUTO: 0.9 K/UL — LOW (ref 1–3.3)
LYMPHOCYTES # BLD AUTO: 7 % — LOW (ref 13–44)
MACROCYTES BLD QL: SLIGHT — SIGNIFICANT CHANGE UP
MANUAL DIF COMMENT BLD-IMP: SIGNIFICANT CHANGE UP
MCHC RBC-ENTMCNC: 29.7 PG — SIGNIFICANT CHANGE UP (ref 27–34)
MCHC RBC-ENTMCNC: 30.8 GM/DL — LOW (ref 32–36)
MCV RBC AUTO: 96.5 FL — SIGNIFICANT CHANGE UP (ref 80–100)
MICROCYTES BLD QL: SLIGHT — SIGNIFICANT CHANGE UP
MONOCYTES # BLD AUTO: 0.5 K/UL — SIGNIFICANT CHANGE UP (ref 0–0.9)
MONOCYTES NFR BLD AUTO: 3.7 % — SIGNIFICANT CHANGE UP (ref 2–14)
NEUTROPHILS # BLD AUTO: 11.4 K/UL — HIGH (ref 1.8–7.4)
NEUTROPHILS NFR BLD AUTO: 85.6 % — HIGH (ref 43–77)
PLAT MORPH BLD: NORMAL — SIGNIFICANT CHANGE UP
PLATELET # BLD AUTO: 715 K/UL — HIGH (ref 150–400)
POIKILOCYTOSIS BLD QL AUTO: SLIGHT — SIGNIFICANT CHANGE UP
POLYCHROMASIA BLD QL SMEAR: SLIGHT — SIGNIFICANT CHANGE UP
POTASSIUM SERPL-MCNC: 5.2 MMOL/L — SIGNIFICANT CHANGE UP (ref 3.5–5.3)
POTASSIUM SERPL-SCNC: 5.2 MMOL/L — SIGNIFICANT CHANGE UP (ref 3.5–5.3)
PROT SERPL-MCNC: 6.1 GM/DL — SIGNIFICANT CHANGE UP (ref 6–8.3)
PROTHROM AB SERPL-ACNC: 16.9 SEC — HIGH (ref 9.8–12.7)
RBC # BLD: 2.64 M/UL — LOW (ref 4.2–5.8)
RBC # FLD: 25.7 % — HIGH (ref 10.3–14.5)
RBC BLD AUTO: (no result)
SCHISTOCYTES BLD QL AUTO: SLIGHT — SIGNIFICANT CHANGE UP
SODIUM SERPL-SCNC: 137 MMOL/L — SIGNIFICANT CHANGE UP (ref 135–145)
TYPE + AB SCN PNL BLD: SIGNIFICANT CHANGE UP
WBC # BLD: 13.3 K/UL — HIGH (ref 3.8–10.5)
WBC # FLD AUTO: 13.3 K/UL — HIGH (ref 3.8–10.5)

## 2018-01-30 PROCEDURE — 99285 EMERGENCY DEPT VISIT HI MDM: CPT

## 2018-01-30 PROCEDURE — 93010 ELECTROCARDIOGRAM REPORT: CPT

## 2018-01-30 NOTE — ED ADULT NURSE NOTE - CHIEF COMPLAINT QUOTE
Pt BIBA from [facility] for low H&H. Pt is asymptomatic. As per EMS, pt was recently brought in for same dx and not transfused. Pt is to get transfusion today.

## 2018-01-30 NOTE — ED PROVIDER NOTE - PMH
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

## 2018-01-30 NOTE — ED PROVIDER NOTE - CONSTITUTIONAL, MLM
normal... +pallor, awake, alert, oriented to person, place, time/situation and in no apparent distress.

## 2018-01-30 NOTE — ED ADULT NURSE NOTE - OBJECTIVE STATEMENT
Pt biba from [facility] for abnormal labs.   pt in no acute distress. Asymptomatic. denies any chest  pain or sob.

## 2018-01-30 NOTE — ED ADULT TRIAGE NOTE - CHIEF COMPLAINT QUOTE
Pt BIBA from [facility] for low H&H. Pt is asymptomatic. As per EMS, pt was recently brought in for same dx and not transfused. Pt is to get transfusion today.

## 2018-02-02 ENCOUNTER — APPOINTMENT (OUTPATIENT)
Dept: NEUROLOGY | Facility: CLINIC | Age: 83
End: 2018-02-02

## 2018-02-10 ENCOUNTER — EMERGENCY (EMERGENCY)
Facility: HOSPITAL | Age: 83
LOS: 0 days | Discharge: NURSING, REHAB OR LONG-TERM CARE | End: 2018-02-10
Attending: EMERGENCY MEDICINE | Admitting: EMERGENCY MEDICINE
Payer: MEDICARE

## 2018-02-10 VITALS
RESPIRATION RATE: 16 BRPM | HEART RATE: 74 BPM | TEMPERATURE: 98 F | HEIGHT: 64 IN | SYSTOLIC BLOOD PRESSURE: 115 MMHG | WEIGHT: 139.33 LBS | DIASTOLIC BLOOD PRESSURE: 66 MMHG

## 2018-02-10 VITALS
TEMPERATURE: 98 F | DIASTOLIC BLOOD PRESSURE: 66 MMHG | RESPIRATION RATE: 17 BRPM | SYSTOLIC BLOOD PRESSURE: 148 MMHG | HEART RATE: 71 BPM | OXYGEN SATURATION: 100 %

## 2018-02-10 DIAGNOSIS — R79.9 ABNORMAL FINDING OF BLOOD CHEMISTRY, UNSPECIFIED: ICD-10-CM

## 2018-02-10 DIAGNOSIS — R94.31 ABNORMAL ELECTROCARDIOGRAM [ECG] [EKG]: ICD-10-CM

## 2018-02-10 DIAGNOSIS — Z86.718 PERSONAL HISTORY OF OTHER VENOUS THROMBOSIS AND EMBOLISM: ICD-10-CM

## 2018-02-10 DIAGNOSIS — I10 ESSENTIAL (PRIMARY) HYPERTENSION: ICD-10-CM

## 2018-02-10 DIAGNOSIS — Z98.890 OTHER SPECIFIED POSTPROCEDURAL STATES: ICD-10-CM

## 2018-02-10 DIAGNOSIS — D64.9 ANEMIA, UNSPECIFIED: ICD-10-CM

## 2018-02-10 DIAGNOSIS — D75.1 SECONDARY POLYCYTHEMIA: ICD-10-CM

## 2018-02-10 DIAGNOSIS — Z98.890 OTHER SPECIFIED POSTPROCEDURAL STATES: Chronic | ICD-10-CM

## 2018-02-10 LAB
ALBUMIN SERPL ELPH-MCNC: 2.9 G/DL — LOW (ref 3.3–5)
ALP SERPL-CCNC: 58 U/L — SIGNIFICANT CHANGE UP (ref 40–120)
ALT FLD-CCNC: 12 U/L — SIGNIFICANT CHANGE UP (ref 12–78)
ANION GAP SERPL CALC-SCNC: 8 MMOL/L — SIGNIFICANT CHANGE UP (ref 5–17)
ANISOCYTOSIS BLD QL: SIGNIFICANT CHANGE UP
APTT BLD: 34 SEC — SIGNIFICANT CHANGE UP (ref 27.5–37.4)
AST SERPL-CCNC: 12 U/L — LOW (ref 15–37)
BASO STIPL BLD QL SMEAR: PRESENT — SIGNIFICANT CHANGE UP
BASOPHILS # BLD AUTO: 0.1 K/UL — SIGNIFICANT CHANGE UP (ref 0–0.2)
BASOPHILS NFR BLD AUTO: 0.9 % — SIGNIFICANT CHANGE UP (ref 0–2)
BILIRUB SERPL-MCNC: 0.2 MG/DL — SIGNIFICANT CHANGE UP (ref 0.2–1.2)
BLD GP AB SCN SERPL QL: SIGNIFICANT CHANGE UP
BUN SERPL-MCNC: 46 MG/DL — HIGH (ref 7–23)
CALCIUM SERPL-MCNC: 8 MG/DL — LOW (ref 8.5–10.1)
CHLORIDE SERPL-SCNC: 105 MMOL/L — SIGNIFICANT CHANGE UP (ref 96–108)
CO2 SERPL-SCNC: 24 MMOL/L — SIGNIFICANT CHANGE UP (ref 22–31)
CREAT SERPL-MCNC: 1.48 MG/DL — HIGH (ref 0.5–1.3)
DACRYOCYTES BLD QL SMEAR: SLIGHT — SIGNIFICANT CHANGE UP
ELLIPTOCYTES BLD QL SMEAR: SLIGHT — SIGNIFICANT CHANGE UP
EOSINOPHIL # BLD AUTO: 0.3 K/UL — SIGNIFICANT CHANGE UP (ref 0–0.5)
EOSINOPHIL NFR BLD AUTO: 2.9 % — SIGNIFICANT CHANGE UP (ref 0–6)
GLUCOSE SERPL-MCNC: 90 MG/DL — SIGNIFICANT CHANGE UP (ref 70–99)
HCT VFR BLD CALC: 25.3 % — LOW (ref 39–50)
HGB BLD-MCNC: 7.9 G/DL — LOW (ref 13–17)
HYPOCHROMIA BLD QL: SLIGHT — SIGNIFICANT CHANGE UP
INR BLD: 1.24 RATIO — HIGH (ref 0.88–1.16)
LYMPHOCYTES # BLD AUTO: 0.9 K/UL — LOW (ref 1–3.3)
LYMPHOCYTES # BLD AUTO: 8.3 % — LOW (ref 13–44)
MACROCYTES OVAL BLD QL SMEAR: SLIGHT — SIGNIFICANT CHANGE UP
MANUAL DIF COMMENT BLD-IMP: SIGNIFICANT CHANGE UP
MCHC RBC-ENTMCNC: 28.8 PG — SIGNIFICANT CHANGE UP (ref 27–34)
MCHC RBC-ENTMCNC: 31.3 GM/DL — LOW (ref 32–36)
MCV RBC AUTO: 92 FL — SIGNIFICANT CHANGE UP (ref 80–100)
MONOCYTES # BLD AUTO: 0.4 K/UL — SIGNIFICANT CHANGE UP (ref 0–0.9)
MONOCYTES NFR BLD AUTO: 3.9 % — SIGNIFICANT CHANGE UP (ref 2–14)
NEUTROPHILS # BLD AUTO: 8.6 K/UL — HIGH (ref 1.8–7.4)
NEUTROPHILS NFR BLD AUTO: 83.9 % — HIGH (ref 43–77)
OVALOCYTES BLD QL SMEAR: SLIGHT — SIGNIFICANT CHANGE UP
PLAT MORPH BLD: NORMAL — SIGNIFICANT CHANGE UP
PLATELET # BLD AUTO: 560 K/UL — HIGH (ref 150–400)
POIKILOCYTOSIS BLD QL AUTO: SLIGHT — SIGNIFICANT CHANGE UP
POLYCHROMASIA BLD QL SMEAR: SLIGHT — SIGNIFICANT CHANGE UP
POTASSIUM SERPL-MCNC: 4.6 MMOL/L — SIGNIFICANT CHANGE UP (ref 3.5–5.3)
POTASSIUM SERPL-SCNC: 4.6 MMOL/L — SIGNIFICANT CHANGE UP (ref 3.5–5.3)
PROT SERPL-MCNC: 6.1 GM/DL — SIGNIFICANT CHANGE UP (ref 6–8.3)
PROTHROM AB SERPL-ACNC: 13.5 SEC — HIGH (ref 9.8–12.7)
RBC # BLD: 2.75 M/UL — LOW (ref 4.2–5.8)
RBC # FLD: 22.5 % — HIGH (ref 10.3–14.5)
RBC BLD AUTO: (no result)
SCHISTOCYTES BLD QL AUTO: SLIGHT — SIGNIFICANT CHANGE UP
SODIUM SERPL-SCNC: 137 MMOL/L — SIGNIFICANT CHANGE UP (ref 135–145)
STOMATOCYTES BLD QL SMEAR: SLIGHT — SIGNIFICANT CHANGE UP
TARGETS BLD QL SMEAR: SLIGHT — SIGNIFICANT CHANGE UP
TYPE + AB SCN PNL BLD: SIGNIFICANT CHANGE UP
WBC # BLD: 10.2 K/UL — SIGNIFICANT CHANGE UP (ref 3.8–10.5)
WBC # FLD AUTO: 10.2 K/UL — SIGNIFICANT CHANGE UP (ref 3.8–10.5)

## 2018-02-10 PROCEDURE — 93010 ELECTROCARDIOGRAM REPORT: CPT

## 2018-02-10 PROCEDURE — 99285 EMERGENCY DEPT VISIT HI MDM: CPT

## 2018-02-10 NOTE — ED PROVIDER NOTE - MEDICAL DECISION MAKING DETAILS
Recheck H&H. If hemoglobin is below 8 will consider transfusion. Will evaluate for source of blood loss, including rectal exam. Recheck H&H. If hemoglobin is below 7 will consider transfusion. Will evaluate for source of blood loss, including rectal exam.  Pt presents with anemia but not at the threshold necessitating a transfusion and has no complaints currently.  Drake Newby, DO

## 2018-02-10 NOTE — ED ADULT NURSE NOTE - OBJECTIVE STATEMENT
Pt BIBA sent for low HBG. Pt denies SOB, CP. Pt skin warm and pink. Pt denies N/V/D. Pt is A&Ox3. Pt appears in no acute respiratory distress, respirations even and non labored. Pt has no complaints.

## 2018-02-10 NOTE — ED ADULT NURSE NOTE - CHPI ED SYMPTOMS NEG
no pain/no decreased eating/drinking/no fever/no headache/no loss of consciousness/no back pain/no nausea/no vomiting/no chills

## 2018-02-10 NOTE — ED PROVIDER NOTE - OBJECTIVE STATEMENT
89 y/o M with PMHx of DVT, HTN and polycythemia sent to the ED from [facility] with niece at bedside for low hemoglobin levels. Pt had blood drawn this morning. Pt states that this is the fourth time he was found with low hemoglobin. Pt had transfusions in the past. pt was admitted for transfusion. Pt is unsure whether he had signs of bleeding. Pt did not notice any melena. Denies any pain. No dizziness. Pt states he was asymptomatic in the past as well. Pt had been at [facility] for rehab for the past few weeks after being admitted to . Pt does not take any anticoagulants. Pt stopped taking ASA after low hemoglobin. Dr. Hermosillo, PCP/cardio.

## 2018-02-13 ENCOUNTER — INPATIENT (INPATIENT)
Facility: HOSPITAL | Age: 83
LOS: 2 days | Discharge: NURSING, REHAB OR LONG-TERM CARE | End: 2018-02-16
Attending: INTERNAL MEDICINE | Admitting: INTERNAL MEDICINE
Payer: MEDICARE

## 2018-02-13 VITALS
RESPIRATION RATE: 18 BRPM | OXYGEN SATURATION: 100 % | DIASTOLIC BLOOD PRESSURE: 69 MMHG | HEIGHT: 68 IN | SYSTOLIC BLOOD PRESSURE: 145 MMHG | HEART RATE: 74 BPM | WEIGHT: 141.1 LBS | TEMPERATURE: 97 F

## 2018-02-13 DIAGNOSIS — Z98.890 OTHER SPECIFIED POSTPROCEDURAL STATES: Chronic | ICD-10-CM

## 2018-02-13 LAB
ADD ON TEST-SPECIMEN IN LAB: SIGNIFICANT CHANGE UP
ALBUMIN SERPL ELPH-MCNC: 3 G/DL — LOW (ref 3.3–5)
ALP SERPL-CCNC: 57 U/L — SIGNIFICANT CHANGE UP (ref 40–120)
ALT FLD-CCNC: 13 U/L — SIGNIFICANT CHANGE UP (ref 12–78)
ANION GAP SERPL CALC-SCNC: 8 MMOL/L — SIGNIFICANT CHANGE UP (ref 5–17)
APTT BLD: 33.1 SEC — SIGNIFICANT CHANGE UP (ref 27.5–37.4)
AST SERPL-CCNC: 12 U/L — LOW (ref 15–37)
BASOPHILS # BLD AUTO: 0.1 K/UL — SIGNIFICANT CHANGE UP (ref 0–0.2)
BASOPHILS NFR BLD AUTO: 1.3 % — SIGNIFICANT CHANGE UP (ref 0–2)
BILIRUB SERPL-MCNC: 0.2 MG/DL — SIGNIFICANT CHANGE UP (ref 0.2–1.2)
BUN SERPL-MCNC: 46 MG/DL — HIGH (ref 7–23)
CALCIUM SERPL-MCNC: 8.2 MG/DL — LOW (ref 8.5–10.1)
CHLORIDE SERPL-SCNC: 106 MMOL/L — SIGNIFICANT CHANGE UP (ref 96–108)
CK SERPL-CCNC: 65 U/L — SIGNIFICANT CHANGE UP (ref 26–308)
CO2 SERPL-SCNC: 24 MMOL/L — SIGNIFICANT CHANGE UP (ref 22–31)
CREAT SERPL-MCNC: 1.63 MG/DL — HIGH (ref 0.5–1.3)
EOSINOPHIL # BLD AUTO: 0.2 K/UL — SIGNIFICANT CHANGE UP (ref 0–0.5)
EOSINOPHIL NFR BLD AUTO: 2 % — SIGNIFICANT CHANGE UP (ref 0–6)
GLUCOSE SERPL-MCNC: 93 MG/DL — SIGNIFICANT CHANGE UP (ref 70–99)
HCT VFR BLD CALC: 26.4 % — LOW (ref 39–50)
HGB BLD-MCNC: 7.9 G/DL — LOW (ref 13–17)
INR BLD: 1.24 RATIO — HIGH (ref 0.88–1.16)
LACTATE SERPL-SCNC: 0.8 MMOL/L — SIGNIFICANT CHANGE UP (ref 0.7–2)
LYMPHOCYTES # BLD AUTO: 0.7 K/UL — LOW (ref 1–3.3)
LYMPHOCYTES # BLD AUTO: 6.6 % — LOW (ref 13–44)
MCHC RBC-ENTMCNC: 27.6 PG — SIGNIFICANT CHANGE UP (ref 27–34)
MCHC RBC-ENTMCNC: 30.1 GM/DL — LOW (ref 32–36)
MCV RBC AUTO: 91.7 FL — SIGNIFICANT CHANGE UP (ref 80–100)
MONOCYTES # BLD AUTO: 0.4 K/UL — SIGNIFICANT CHANGE UP (ref 0–0.9)
MONOCYTES NFR BLD AUTO: 3.8 % — SIGNIFICANT CHANGE UP (ref 2–14)
NEUTROPHILS # BLD AUTO: 9.7 K/UL — HIGH (ref 1.8–7.4)
NEUTROPHILS NFR BLD AUTO: 86.3 % — HIGH (ref 43–77)
PLATELET # BLD AUTO: 525 K/UL — HIGH (ref 150–400)
POTASSIUM SERPL-MCNC: 4.4 MMOL/L — SIGNIFICANT CHANGE UP (ref 3.5–5.3)
POTASSIUM SERPL-SCNC: 4.4 MMOL/L — SIGNIFICANT CHANGE UP (ref 3.5–5.3)
PROT SERPL-MCNC: 6.2 GM/DL — SIGNIFICANT CHANGE UP (ref 6–8.3)
PROTHROM AB SERPL-ACNC: 13.4 SEC — HIGH (ref 9.8–12.7)
RBC # BLD: 2.88 M/UL — LOW (ref 4.2–5.8)
RBC # FLD: 22.2 % — HIGH (ref 10.3–14.5)
SODIUM SERPL-SCNC: 138 MMOL/L — SIGNIFICANT CHANGE UP (ref 135–145)
TROPONIN I SERPL-MCNC: <0.015 NG/ML — SIGNIFICANT CHANGE UP (ref 0.01–0.04)
TYPE + AB SCN PNL BLD: SIGNIFICANT CHANGE UP
WBC # BLD: 11.2 K/UL — HIGH (ref 3.8–10.5)
WBC # FLD AUTO: 11.2 K/UL — HIGH (ref 3.8–10.5)

## 2018-02-13 PROCEDURE — 99285 EMERGENCY DEPT VISIT HI MDM: CPT

## 2018-02-13 PROCEDURE — 93010 ELECTROCARDIOGRAM REPORT: CPT

## 2018-02-13 PROCEDURE — 71045 X-RAY EXAM CHEST 1 VIEW: CPT | Mod: 26

## 2018-02-13 RX ORDER — ACETAMINOPHEN 500 MG
650 TABLET ORAL EVERY 6 HOURS
Qty: 0 | Refills: 0 | Status: DISCONTINUED | OUTPATIENT
Start: 2018-02-13 | End: 2018-02-16

## 2018-02-13 RX ORDER — DOCUSATE SODIUM 100 MG
100 CAPSULE ORAL
Qty: 0 | Refills: 0 | Status: DISCONTINUED | OUTPATIENT
Start: 2018-02-13 | End: 2018-02-16

## 2018-02-13 RX ORDER — PANTOPRAZOLE SODIUM 20 MG/1
80 TABLET, DELAYED RELEASE ORAL ONCE
Qty: 0 | Refills: 0 | Status: COMPLETED | OUTPATIENT
Start: 2018-02-13 | End: 2018-02-13

## 2018-02-13 RX ORDER — ONDANSETRON 8 MG/1
4 TABLET, FILM COATED ORAL EVERY 6 HOURS
Qty: 0 | Refills: 0 | Status: DISCONTINUED | OUTPATIENT
Start: 2018-02-13 | End: 2018-02-16

## 2018-02-13 RX ORDER — AMLODIPINE BESYLATE 2.5 MG/1
10 TABLET ORAL DAILY
Qty: 0 | Refills: 0 | Status: DISCONTINUED | OUTPATIENT
Start: 2018-02-13 | End: 2018-02-16

## 2018-02-13 RX ORDER — PANTOPRAZOLE SODIUM 20 MG/1
8 TABLET, DELAYED RELEASE ORAL
Qty: 80 | Refills: 0 | Status: DISCONTINUED | OUTPATIENT
Start: 2018-02-13 | End: 2018-02-15

## 2018-02-13 RX ORDER — SODIUM CHLORIDE 9 MG/ML
1000 INJECTION INTRAMUSCULAR; INTRAVENOUS; SUBCUTANEOUS
Qty: 0 | Refills: 0 | Status: DISCONTINUED | OUTPATIENT
Start: 2018-02-13 | End: 2018-02-15

## 2018-02-13 RX ORDER — FOLIC ACID 0.8 MG
1 TABLET ORAL DAILY
Qty: 0 | Refills: 0 | Status: DISCONTINUED | OUTPATIENT
Start: 2018-02-13 | End: 2018-02-16

## 2018-02-13 RX ADMIN — PANTOPRAZOLE SODIUM 10 MG/HR: 20 TABLET, DELAYED RELEASE ORAL at 23:26

## 2018-02-13 RX ADMIN — PANTOPRAZOLE SODIUM 10 MG/HR: 20 TABLET, DELAYED RELEASE ORAL at 15:50

## 2018-02-13 RX ADMIN — PANTOPRAZOLE SODIUM 80 MILLIGRAM(S): 20 TABLET, DELAYED RELEASE ORAL at 14:15

## 2018-02-13 RX ADMIN — SODIUM CHLORIDE 75 MILLILITER(S): 9 INJECTION INTRAMUSCULAR; INTRAVENOUS; SUBCUTANEOUS at 22:15

## 2018-02-13 NOTE — ED PROVIDER NOTE - MEDICAL DECISION MAKING DETAILS
Abhijit Patel MD (resident): non-symptomatic anemia on eliquis (and recently stopped ASA as per previous note), w/ exam concerning for hemocult positive stool. Will get labs w/ H/H and coags, T&S. Treat w/ Protonix for presumed upper GI source. Will need admission for hemodynamic monitoring, treatment of GI bleeding, and probably endoscopy. Likely will require blood transfusion due to GI bleed on A/C.

## 2018-02-13 NOTE — H&P ADULT - ASSESSMENT
89 y.o. male with PMH polycythemia on hydroxyurea, HTN, h/o back surgery, L DVT on Eliquis presents from [facility] with drop in Hb. Pt was found to have Hb 7.07 and BP 98/60 and sent to [facility] for further eval. Pt was at [facility] for rehab. Pt denies symptoms. Denies fever, chills, sore throat, CP, SOB, abd pain, diarrhea, or dysuria. Denies lightheadedness or dizziness. Denies hemoptysis, hematuria, or BRBPR or melena.    #drop in Hb with +FOBT due to anemia due to GI bleed  #L DVT on Eliquis  -admit to hospitalist service  -pt receiving 1 unit PRBC in ED  -cont protonix drip  -hold eliquis and asa  -serial H/H  -gentle iv hydration  -GI consult  -cardio consult    #diastolic dysfunction, aortic stenosis  -echo (1/16/18) EF 55% mild AR, moderate AS, mod MR, EA reversal, mild TR, mild MT  -i/o, daily weight    #thrombocytosis, polycythemia  -pt on home hydrea. hold until heme eval  -heme consult, Dr Lau    #DVT on Eliquis  -hold eliquis for now    #JOSE on CKD3  -on prior admission, Cr range 1.11-2.15  -currently, Cr 1.63    #HTN  -on norvasc with holding parameter    #hx lung opacity  -outpt pulm follow up    #DVT ppx  -SCDs    #Advanced Care Planning  -Discussed in detail regarding advanced directives with patient. Pt reports wanting to continue to live if possible. Reports FULL code
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
## 2018-02-13 NOTE — ED PROVIDER NOTE - PHYSICAL EXAMINATION
Physical Exam: elderly M who is in NAD, AAOx2, NCAT, MMM, neck is supple, PERRL, but + pallor of the palpebral conjunctiva, CTAB, normal rate and regular rhythm, abdomen is soft and NTND, no pulsatile mass, no organomegaly, No edema, No deformity of extremities, No rashes, CN grossly intact, No focal motor or sensory deficits. Rectal exam w/ RN Virginia Rueda as chaperone, with brown stool, but + hemoccult positive. ~ Abhijit Patel MD Physical Exam: elderly M who is in NAD, AAOx2, NCAT, MMM, neck is supple, PERRL, but + pallor of the palpebral conjunctiva, CTAB, normal rate and regular rhythm, abdomen is soft and NTND, no pulsatile mass, no organomegaly, No edema, No deformity of extremities, No rashes, CN grossly intact, No focal motor or sensory deficits. Rectal exam w/ RN Virginia Rueda as chaperone, with brown stool, but + hemoccult positive. ~ Abhijit Patel MD    Pt well appearing, hemoccult positive stool, brown; no other findings on exam at this time.  Paknaj Chiu DO.

## 2018-02-13 NOTE — ED ADULT TRIAGE NOTE - CHIEF COMPLAINT QUOTE
as per EMS, pt sent for anemia with possible blood transfusion. EMS denies active bleeding. Pt denies pain.

## 2018-02-13 NOTE — ED ADULT NURSE NOTE - OBJECTIVE STATEMENT
sent in from [facility] for low h/h, pt state he recently received blood transfusion during 5 admission to hospital, denies dizziness, chest pain

## 2018-02-13 NOTE — PROVIDER CONTACT NOTE (OTHER) - SITUATION
hx DVT on eliquis coming with GI bleed. Eval for ?IVC filter  Service aware
ED spoke with Dr Larsen about this patient who will see in AM. GI bleed
hx anemia, polycythemia vera on hydrea coming with GIB  Service aware

## 2018-02-13 NOTE — ED PROVIDER NOTE - NS ED ROS FT
No fever, no chills, no change in vision, no change in hearing, no chest pain, no shortness of breath, no abdominal pain, + anemia, no vomiting, no dysuria, no muscle pain, no rashes, no loss of consciousness. ~ Abhijit Patel MD

## 2018-02-13 NOTE — ED ADULT NURSE REASSESSMENT NOTE - NS ED NURSE REASSESS COMMENT FT1
Received patient from [facility]. Pt AxOx4, VS stable at this time, blood transfusion in progress, tolerating well without complaints. Pt with no stated concerns at this time. Plan of care explained and patient verbalized understanding. Comfort and safety measures maintained. Will continue to monitor.

## 2018-02-13 NOTE — ED PROVIDER NOTE - PROGRESS NOTE DETAILS
Abhijit Patel MD (resident): vitals stable, no episodes of gross GIB in the ED. H/H stable at pt's baseline. Though w/ Hx of CAD, pt w/o drop in H/H, so defer to GI and hospitalist for decision on pRBC. Abhijit Patel MD (resident): discussed w/ NP for Dr. Olmedo who will take the consult, and they will come to see pt in the AM. Endorsed w/ Dr. Stovall, who recommended 1 pRBC transfusion at this time. Accepted pt to his service

## 2018-02-13 NOTE — ED PROVIDER NOTE - OBJECTIVE STATEMENT
Abhijit Patel MD (resident): 89 M w/ Hx of DVT in January 2018 s/p Eliquis, ischemic heart disease on ASA, GI bleed Dec 2017, who was BIBEMS from [facility] for low Hb of 7. No abdominal pain associated. Pt asymptomatic without CP, SOB, dizziness, syncope. Denies any rectal bleeding or melena. Pt unsure what medications he takes, but medication list has ASA and Eliquis.

## 2018-02-13 NOTE — PATIENT PROFILE ADULT. - ABILITY TO HEAR (WITH HEARING AID OR HEARING APPLIANCE IF NORMALLY USED):
no hearing aides/Mildly to Moderately Impaired: difficulty hearing in some environments or speaker may need to increase volume or speak distinctly

## 2018-02-14 LAB
ANION GAP SERPL CALC-SCNC: 7 MMOL/L — SIGNIFICANT CHANGE UP (ref 5–17)
BASOPHILS # BLD AUTO: 0.1 K/UL — SIGNIFICANT CHANGE UP (ref 0–0.2)
BASOPHILS NFR BLD AUTO: 1 % — SIGNIFICANT CHANGE UP (ref 0–2)
BUN SERPL-MCNC: 40 MG/DL — HIGH (ref 7–23)
CALCIUM SERPL-MCNC: 8.1 MG/DL — LOW (ref 8.5–10.1)
CHLORIDE SERPL-SCNC: 109 MMOL/L — HIGH (ref 96–108)
CO2 SERPL-SCNC: 24 MMOL/L — SIGNIFICANT CHANGE UP (ref 22–31)
CREAT SERPL-MCNC: 1.34 MG/DL — HIGH (ref 0.5–1.3)
EOSINOPHIL # BLD AUTO: 0.2 K/UL — SIGNIFICANT CHANGE UP (ref 0–0.5)
EOSINOPHIL NFR BLD AUTO: 2.4 % — SIGNIFICANT CHANGE UP (ref 0–6)
GLUCOSE SERPL-MCNC: 85 MG/DL — SIGNIFICANT CHANGE UP (ref 70–99)
HCT VFR BLD CALC: 24.2 % — LOW (ref 39–50)
HCT VFR BLD CALC: 27.2 % — LOW (ref 39–50)
HCT VFR BLD CALC: 28.5 % — LOW (ref 39–50)
HGB BLD-MCNC: 7.5 G/DL — LOW (ref 13–17)
HGB BLD-MCNC: 8.6 G/DL — LOW (ref 13–17)
HGB BLD-MCNC: 8.8 G/DL — LOW (ref 13–17)
LYMPHOCYTES # BLD AUTO: 0.7 K/UL — LOW (ref 1–3.3)
LYMPHOCYTES # BLD AUTO: 6.7 % — LOW (ref 13–44)
MAGNESIUM SERPL-MCNC: 1.9 MG/DL — SIGNIFICANT CHANGE UP (ref 1.6–2.6)
MCHC RBC-ENTMCNC: 27.8 PG — SIGNIFICANT CHANGE UP (ref 27–34)
MCHC RBC-ENTMCNC: 30.9 GM/DL — LOW (ref 32–36)
MCV RBC AUTO: 89.9 FL — SIGNIFICANT CHANGE UP (ref 80–100)
MONOCYTES # BLD AUTO: 0.4 K/UL — SIGNIFICANT CHANGE UP (ref 0–0.9)
MONOCYTES NFR BLD AUTO: 3.6 % — SIGNIFICANT CHANGE UP (ref 2–14)
NEUTROPHILS # BLD AUTO: 8.5 K/UL — HIGH (ref 1.8–7.4)
NEUTROPHILS NFR BLD AUTO: 86.2 % — HIGH (ref 43–77)
PHOSPHATE SERPL-MCNC: 3 MG/DL — SIGNIFICANT CHANGE UP (ref 2.5–4.5)
PLATELET # BLD AUTO: 408 K/UL — HIGH (ref 150–400)
POTASSIUM SERPL-MCNC: 4.5 MMOL/L — SIGNIFICANT CHANGE UP (ref 3.5–5.3)
POTASSIUM SERPL-SCNC: 4.5 MMOL/L — SIGNIFICANT CHANGE UP (ref 3.5–5.3)
RBC # BLD: 3.17 M/UL — LOW (ref 4.2–5.8)
RBC # FLD: 20.4 % — HIGH (ref 10.3–14.5)
SODIUM SERPL-SCNC: 140 MMOL/L — SIGNIFICANT CHANGE UP (ref 135–145)
WBC # BLD: 9.9 K/UL — SIGNIFICANT CHANGE UP (ref 3.8–10.5)
WBC # FLD AUTO: 9.9 K/UL — SIGNIFICANT CHANGE UP (ref 3.8–10.5)

## 2018-02-14 PROCEDURE — 99232 SBSQ HOSP IP/OBS MODERATE 35: CPT

## 2018-02-14 RX ORDER — POLYETHYLENE GLYCOL 3350 17 G/17G
17 POWDER, FOR SOLUTION ORAL DAILY
Qty: 0 | Refills: 0 | Status: DISCONTINUED | OUTPATIENT
Start: 2018-02-14 | End: 2018-02-16

## 2018-02-14 RX ORDER — HYDROXYUREA 500 MG/1
500 CAPSULE ORAL DAILY
Qty: 0 | Refills: 0 | Status: DISCONTINUED | OUTPATIENT
Start: 2018-02-14 | End: 2018-02-16

## 2018-02-14 RX ADMIN — Medication 100 MILLIGRAM(S): at 17:16

## 2018-02-14 RX ADMIN — PANTOPRAZOLE SODIUM 10 MG/HR: 20 TABLET, DELAYED RELEASE ORAL at 14:08

## 2018-02-14 RX ADMIN — Medication 1 MILLIGRAM(S): at 11:36

## 2018-02-14 RX ADMIN — HYDROXYUREA 500 MILLIGRAM(S): 500 CAPSULE ORAL at 13:20

## 2018-02-14 RX ADMIN — Medication 100 MILLIGRAM(S): at 06:25

## 2018-02-14 RX ADMIN — AMLODIPINE BESYLATE 10 MILLIGRAM(S): 2.5 TABLET ORAL at 06:23

## 2018-02-14 RX ADMIN — PANTOPRAZOLE SODIUM 10 MG/HR: 20 TABLET, DELAYED RELEASE ORAL at 17:16

## 2018-02-14 RX ADMIN — POLYETHYLENE GLYCOL 3350 17 GRAM(S): 17 POWDER, FOR SOLUTION ORAL at 11:39
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
## 2018-02-14 NOTE — CONSULT NOTE ADULT - ASSESSMENT
89 y/o male with long history of myeloproliferative  disorder on Hydrea to control thrombocytosis, on ASA, on Eliquis for recent DVT now admitted with recurrent anemia and evidence of GI bleeding (FOBT positive).   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  Transfusion support.  On PPI. Evaluated by GI.  Hold ASA and Eliquis/ consider IVC filter ( he does not tolerate anticoagulation).   Continue Hydrea( dose lowered recently to 500 mg daily)  maintain plts preferably  under 500 K.   Thank You. Will follow up on Friday. Dr Romero covering tomorrow if needed.
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
## 2018-02-14 NOTE — CONSULT NOTE ADULT - ASSESSMENT
89 y/o elderly male with multiple medical issues, now with + FOBT and anemia s/p blood transfusion with no active bleeding.    Plan:  will advance to clears.  continue protonix drip.  monitor h/h and transfuse as needed.  will start miralax daily for constipation.  hold eliquis/asa at this time until vascular evaluates for DVT.     Discussed POC with Dr. Ruano and nurse.    Seen and examined with Dr. Osborn. 89 y/o elderly male with multiple medical issues, now with + FOBT and anemia s/p blood transfusion with no active bleeding.    Plan:  will advance to clears.  continue protonix drip.  monitor h/h and transfuse as needed.  will start miralax daily for constipation.  hold eliquis/asa at this time until vascular evaluates for DVT.     Will continue to follow H/H, at this time will continue supportive care and await for hematology and vascular consults  Discussed POC with Dr. Ruano and nurse.    Seen and examined with Dr. Osborn.

## 2018-02-15 LAB
ALBUMIN SERPL ELPH-MCNC: 2.6 G/DL — LOW (ref 3.3–5)
ALP SERPL-CCNC: 55 U/L — SIGNIFICANT CHANGE UP (ref 40–120)
ALT FLD-CCNC: 9 U/L — LOW (ref 12–78)
ANION GAP SERPL CALC-SCNC: 8 MMOL/L — SIGNIFICANT CHANGE UP (ref 5–17)
ANISOCYTOSIS BLD QL: SLIGHT — SIGNIFICANT CHANGE UP
AST SERPL-CCNC: 12 U/L — LOW (ref 15–37)
BASOPHILS # BLD AUTO: 0.1 K/UL — SIGNIFICANT CHANGE UP (ref 0–0.2)
BASOPHILS NFR BLD AUTO: 1.3 % — SIGNIFICANT CHANGE UP (ref 0–2)
BILIRUB SERPL-MCNC: 0.4 MG/DL — SIGNIFICANT CHANGE UP (ref 0.2–1.2)
BUN SERPL-MCNC: 33 MG/DL — HIGH (ref 7–23)
CALCIUM SERPL-MCNC: 8.5 MG/DL — SIGNIFICANT CHANGE UP (ref 8.5–10.1)
CHLORIDE SERPL-SCNC: 108 MMOL/L — SIGNIFICANT CHANGE UP (ref 96–108)
CO2 SERPL-SCNC: 23 MMOL/L — SIGNIFICANT CHANGE UP (ref 22–31)
CREAT SERPL-MCNC: 1.36 MG/DL — HIGH (ref 0.5–1.3)
DACRYOCYTES BLD QL SMEAR: SLIGHT — SIGNIFICANT CHANGE UP
ELLIPTOCYTES BLD QL SMEAR: SLIGHT — SIGNIFICANT CHANGE UP
EOSINOPHIL # BLD AUTO: 0.3 K/UL — SIGNIFICANT CHANGE UP (ref 0–0.5)
EOSINOPHIL NFR BLD AUTO: 2.5 % — SIGNIFICANT CHANGE UP (ref 0–6)
GLUCOSE SERPL-MCNC: 85 MG/DL — SIGNIFICANT CHANGE UP (ref 70–99)
HCT VFR BLD CALC: 27.9 % — LOW (ref 39–50)
HCT VFR BLD CALC: 29.6 % — LOW (ref 39–50)
HGB BLD-MCNC: 8.9 G/DL — LOW (ref 13–17)
HGB BLD-MCNC: 9.1 G/DL — LOW (ref 13–17)
HYPOCHROMIA BLD QL: SLIGHT — SIGNIFICANT CHANGE UP
LG PLATELETS BLD QL AUTO: SLIGHT — SIGNIFICANT CHANGE UP
LYMPHOCYTES # BLD AUTO: 0.7 K/UL — LOW (ref 1–3.3)
LYMPHOCYTES # BLD AUTO: 5.8 % — LOW (ref 13–44)
MACROCYTES BLD QL: SLIGHT — SIGNIFICANT CHANGE UP
MANUAL DIF COMMENT BLD-IMP: SIGNIFICANT CHANGE UP
MCHC RBC-ENTMCNC: 27.7 PG — SIGNIFICANT CHANGE UP (ref 27–34)
MCHC RBC-ENTMCNC: 30.8 GM/DL — LOW (ref 32–36)
MCV RBC AUTO: 90.1 FL — SIGNIFICANT CHANGE UP (ref 80–100)
MICROCYTES BLD QL: SLIGHT — SIGNIFICANT CHANGE UP
MONOCYTES # BLD AUTO: 0.5 K/UL — SIGNIFICANT CHANGE UP (ref 0–0.9)
MONOCYTES NFR BLD AUTO: 4.5 % — SIGNIFICANT CHANGE UP (ref 2–14)
NEUTROPHILS # BLD AUTO: 9.9 K/UL — HIGH (ref 1.8–7.4)
NEUTROPHILS NFR BLD AUTO: 85.9 % — HIGH (ref 43–77)
NEUTS HYPERSEG # BLD: PRESENT — SIGNIFICANT CHANGE UP
PLAT MORPH BLD: NORMAL — SIGNIFICANT CHANGE UP
PLATELET # BLD AUTO: 372 K/UL — SIGNIFICANT CHANGE UP (ref 150–400)
POIKILOCYTOSIS BLD QL AUTO: SLIGHT — SIGNIFICANT CHANGE UP
POLYCHROMASIA BLD QL SMEAR: SLIGHT — SIGNIFICANT CHANGE UP
POTASSIUM SERPL-MCNC: 4.6 MMOL/L — SIGNIFICANT CHANGE UP (ref 3.5–5.3)
POTASSIUM SERPL-SCNC: 4.6 MMOL/L — SIGNIFICANT CHANGE UP (ref 3.5–5.3)
PROT SERPL-MCNC: 5.7 GM/DL — LOW (ref 6–8.3)
RBC # BLD: 3.28 M/UL — LOW (ref 4.2–5.8)
RBC # FLD: 20.9 % — HIGH (ref 10.3–14.5)
RBC BLD AUTO: (no result)
SCHISTOCYTES BLD QL AUTO: SLIGHT — SIGNIFICANT CHANGE UP
SODIUM SERPL-SCNC: 139 MMOL/L — SIGNIFICANT CHANGE UP (ref 135–145)
SPHEROCYTES BLD QL SMEAR: SLIGHT — SIGNIFICANT CHANGE UP
WBC # BLD: 11.6 K/UL — HIGH (ref 3.8–10.5)
WBC # FLD AUTO: 11.6 K/UL — HIGH (ref 3.8–10.5)

## 2018-02-15 PROCEDURE — 37191 INS ENDOVAS VENA CAVA FILTR: CPT

## 2018-02-15 RX ORDER — SODIUM CHLORIDE 9 MG/ML
1000 INJECTION INTRAMUSCULAR; INTRAVENOUS; SUBCUTANEOUS
Qty: 0 | Refills: 0 | Status: DISCONTINUED | OUTPATIENT
Start: 2018-02-15 | End: 2018-02-15

## 2018-02-15 RX ORDER — ONDANSETRON 8 MG/1
4 TABLET, FILM COATED ORAL ONCE
Qty: 0 | Refills: 0 | Status: DISCONTINUED | OUTPATIENT
Start: 2018-02-15 | End: 2018-02-15

## 2018-02-15 RX ORDER — FENTANYL CITRATE 50 UG/ML
25 INJECTION INTRAVENOUS
Qty: 0 | Refills: 0 | Status: DISCONTINUED | OUTPATIENT
Start: 2018-02-15 | End: 2018-02-15

## 2018-02-15 RX ORDER — PANTOPRAZOLE SODIUM 20 MG/1
40 TABLET, DELAYED RELEASE ORAL
Qty: 0 | Refills: 0 | Status: DISCONTINUED | OUTPATIENT
Start: 2018-02-15 | End: 2018-02-16

## 2018-02-15 RX ORDER — OXYCODONE HYDROCHLORIDE 5 MG/1
5 TABLET ORAL EVERY 4 HOURS
Qty: 0 | Refills: 0 | Status: DISCONTINUED | OUTPATIENT
Start: 2018-02-15 | End: 2018-02-15

## 2018-02-15 RX ADMIN — AMLODIPINE BESYLATE 10 MILLIGRAM(S): 2.5 TABLET ORAL at 05:25

## 2018-02-15 RX ADMIN — Medication 100 MILLIGRAM(S): at 17:54

## 2018-02-15 RX ADMIN — HYDROXYUREA 500 MILLIGRAM(S): 500 CAPSULE ORAL at 12:16

## 2018-02-15 RX ADMIN — PANTOPRAZOLE SODIUM 10 MG/HR: 20 TABLET, DELAYED RELEASE ORAL at 03:50

## 2018-02-15 RX ADMIN — Medication 1 MILLIGRAM(S): at 12:15

## 2018-02-15 RX ADMIN — Medication 100 MILLIGRAM(S): at 05:25

## 2018-02-15 RX ADMIN — POLYETHYLENE GLYCOL 3350 17 GRAM(S): 17 POWDER, FOR SOLUTION ORAL at 12:16

## 2018-02-15 NOTE — PHYSICAL THERAPY INITIAL EVALUATION ADULT - MODALITIES TREATMENT COMMENTS
The pt responded well to transfer tx, ambulation tx, and therex review. The pt was left sitting OOB and in a chair with chair alarm secured and shoes on. The pt was in NAD. RN aware. CB, tray and phone in place.

## 2018-02-15 NOTE — PHYSICAL THERAPY INITIAL EVALUATION ADULT - PERTINENT HX OF CURRENT PROBLEM, REHAB EVAL
89 y.o. male with PMH polycythemia on hydroxyurea, HTN, h/o back surgery, L DVT on Eliquis presents from [facility] with drop in Hb. Pt was found to have Hb 7.07 and BP 98/60 and sent to [facility] for further eval.

## 2018-02-15 NOTE — BRIEF OPERATIVE NOTE - PROCEDURE
<<-----Click on this checkbox to enter Procedure Insertion, IVC filter, endovascular  02/15/2018    Active  AARMETTA

## 2018-02-15 NOTE — PHYSICAL THERAPY INITIAL EVALUATION ADULT - GENERAL OBSERVATIONS, REHAB EVAL
The pt was pleasant and cooperative and eager to ambulate with PT. The pt was received on 5S, supine, with 1 IV present.

## 2018-02-15 NOTE — PROGRESS NOTE ADULT - SUBJECTIVE AND OBJECTIVE BOX
89 y.o. male with PMH polycythemia on hydroxyurea, HTN, h/o back surgery, L DVT on Eliquis admited through ER  [facility] rehab with occult GI bleed and hb 7.07, BP 98/60 in ED, on eliquis .diagnosed with L DVT   13jan 2018  patient denies abdominal pain ,no hematemesis,no BRBPR, no kerry, no fever.reports being comfortable,denies any complaints ,wants to go home .patient was supposed to get discharged yesterday from [facility] to home .     Constitutional: Well appearing  HEENT: Atraumatic, DARRELL, Normal, No congestion  Respiratory: Breath Sounds normal, no rhonchi/wheeze  Cardiovascular: N S1S2; VALERIY present  Gastrointestinal: Abdomen soft, non tender, Bowel Ssounds present  Extremities: No edema, peripheral pulses present  Neurological: AAO x 3, no gross focal motor deficits  Skin: Non cellulitic, no rash, ulcers  Lymph Nodes: No lymphadenopathy noted  Back: No CVA tenderness   Musculoskeletal: non tender  Breasts: Deferred  Genitourinary: deferred  Rectal: Deferred

## 2018-02-15 NOTE — PROGRESS NOTE ADULT - SUBJECTIVE AND OBJECTIVE BOX
Patient is a 89y old  Male who presents with a chief complaint of Pt. states I feel fine, but they said my lab work was low. (13 Feb 2018 19:59)      Subective:  Feels good. 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  Polycythemia  Tremor  HTN (hypertension)  History of back surgery      MEDICATIONS  (STANDING):  amLODIPine   Tablet 10 milliGRAM(s) Oral daily  docusate sodium 100 milliGRAM(s) Oral two times a day  folic acid 1 milliGRAM(s) Oral daily  hydroxyurea 500 milliGRAM(s) Oral daily  pantoprazole    Tablet 40 milliGRAM(s) Oral before breakfast  polyethylene glycol 3350 17 Gram(s) Oral daily    MEDICATIONS  (PRN):  acetaminophen   Tablet 650 milliGRAM(s) Oral every 6 hours PRN For Temp greater than 38 C (100.4 F)  ondansetron Injectable 4 milliGRAM(s) IV Push every 6 hours PRN Nausea      REVIEW OF SYSTEMS:    RESPIRATORY: No shortness of breath  CARDIOVASCULAR: No chest pain  All other review of systems is negative unless indicated above.    Vital Signs Last 24 Hrs  T(C): 36.3 (15 Feb 2018 11:41), Max: 36.9 (14 Feb 2018 21:35)  T(F): 97.3 (15 Feb 2018 11:41), Max: 98.4 (14 Feb 2018 21:35)  HR: 82 (15 Feb 2018 11:41) (64 - 82)  BP: 155/62 (15 Feb 2018 11:41) (107/62 - 155/62)  BP(mean): --  RR: 16 (15 Feb 2018 11:41) (11 - 17)  SpO2: 98% (15 Feb 2018 11:41) (98% - 100%)    PHYSICAL EXAM:    Constitutional: NAD, well-developed  Respiratory: CTAB  Cardiovascular: S1 and S2,   Gastrointestinal: BS+, soft, NT/ND  Extremities: No peripheral edema  Psychiatric: Normal mood, normal affect    LABS:                        9.1    11.6  )-----------( 372      ( 15 Feb 2018 06:17 )             29.6     02-15    139  |  108  |  33<H>  ----------------------------<  85  4.6   |  23  |  1.36<H>    Ca    8.5      15 Feb 2018 06:17  Phos  3.0     02-14  Mg     1.9     02-14    TPro  5.7<L>  /  Alb  2.6<L>  /  TBili  0.4  /  DBili  x   /  AST  12<L>  /  ALT  9<L>  /  AlkPhos  55  02-15      LIVER FUNCTIONS - ( 15 Feb 2018 06:17 )  Alb: 2.6 g/dL / Pro: 5.7 gm/dL / ALK PHOS: 55 U/L / ALT: 9 U/L / AST: 12 U/L / GGT: x             RADIOLOGY & ADDITIONAL STUDIES:

## 2018-02-15 NOTE — PHYSICAL THERAPY INITIAL EVALUATION ADULT - IMPAIRMENTS CONTRIBUTING TO GAIT DEVIATIONS, PT EVAL
impaired postural control/decreased flexibility/steppage gait. The pt requested to ambulate with his boots/ shoes on./impaired coordination

## 2018-02-15 NOTE — BRIEF OPERATIVE NOTE - OPERATION/FINDINGS
1) Tortuous IVC with enlarged L gonadal vein in expected foothold of IVC filter  2) IVC filter deployment below the BL renal vein and R gonadal vein 1) Tortuous IVC with enlarged R gonadal vein in expected foothold of IVC filter  2) IVC filter deployment below the BL renal veins and R gonadal vein

## 2018-02-16 ENCOUNTER — TRANSCRIPTION ENCOUNTER (OUTPATIENT)
Age: 83
End: 2018-02-16

## 2018-02-16 VITALS
DIASTOLIC BLOOD PRESSURE: 72 MMHG | OXYGEN SATURATION: 100 % | TEMPERATURE: 98 F | SYSTOLIC BLOOD PRESSURE: 115 MMHG | RESPIRATION RATE: 16 BRPM | HEART RATE: 90 BPM

## 2018-02-16 LAB
ALBUMIN SERPL ELPH-MCNC: 2.6 G/DL — LOW (ref 3.3–5)
ALP SERPL-CCNC: 60 U/L — SIGNIFICANT CHANGE UP (ref 40–120)
ALT FLD-CCNC: 11 U/L — LOW (ref 12–78)
ANION GAP SERPL CALC-SCNC: 7 MMOL/L — SIGNIFICANT CHANGE UP (ref 5–17)
ANISOCYTOSIS BLD QL: SLIGHT — SIGNIFICANT CHANGE UP
AST SERPL-CCNC: 10 U/L — LOW (ref 15–37)
BASOPHILS # BLD AUTO: 0.1 K/UL — SIGNIFICANT CHANGE UP (ref 0–0.2)
BASOPHILS NFR BLD AUTO: 1.4 % — SIGNIFICANT CHANGE UP (ref 0–2)
BILIRUB SERPL-MCNC: 0.3 MG/DL — SIGNIFICANT CHANGE UP (ref 0.2–1.2)
BUN SERPL-MCNC: 33 MG/DL — HIGH (ref 7–23)
CALCIUM SERPL-MCNC: 8.3 MG/DL — LOW (ref 8.5–10.1)
CHLORIDE SERPL-SCNC: 107 MMOL/L — SIGNIFICANT CHANGE UP (ref 96–108)
CO2 SERPL-SCNC: 24 MMOL/L — SIGNIFICANT CHANGE UP (ref 22–31)
CREAT SERPL-MCNC: 1.46 MG/DL — HIGH (ref 0.5–1.3)
DACRYOCYTES BLD QL SMEAR: SLIGHT — SIGNIFICANT CHANGE UP
ELLIPTOCYTES BLD QL SMEAR: SLIGHT — SIGNIFICANT CHANGE UP
EOSINOPHIL # BLD AUTO: 0.3 K/UL — SIGNIFICANT CHANGE UP (ref 0–0.5)
EOSINOPHIL NFR BLD AUTO: 3.1 % — SIGNIFICANT CHANGE UP (ref 0–6)
GLUCOSE SERPL-MCNC: 86 MG/DL — SIGNIFICANT CHANGE UP (ref 70–99)
HCT VFR BLD CALC: 29 % — LOW (ref 39–50)
HGB BLD-MCNC: 9.1 G/DL — LOW (ref 13–17)
HYPOCHROMIA BLD QL: SLIGHT — SIGNIFICANT CHANGE UP
LYMPHOCYTES # BLD AUTO: 0.6 K/UL — LOW (ref 1–3.3)
LYMPHOCYTES # BLD AUTO: 6 % — LOW (ref 13–44)
MACROCYTES BLD QL: SLIGHT — SIGNIFICANT CHANGE UP
MANUAL DIF COMMENT BLD-IMP: SIGNIFICANT CHANGE UP
MCHC RBC-ENTMCNC: 28.1 PG — SIGNIFICANT CHANGE UP (ref 27–34)
MCHC RBC-ENTMCNC: 31.4 GM/DL — LOW (ref 32–36)
MCV RBC AUTO: 89.7 FL — SIGNIFICANT CHANGE UP (ref 80–100)
MICROCYTES BLD QL: SLIGHT — SIGNIFICANT CHANGE UP
MONOCYTES # BLD AUTO: 0.5 K/UL — SIGNIFICANT CHANGE UP (ref 0–0.9)
MONOCYTES NFR BLD AUTO: 4.7 % — SIGNIFICANT CHANGE UP (ref 2–14)
NEUTROPHILS # BLD AUTO: 9.1 K/UL — HIGH (ref 1.8–7.4)
NEUTROPHILS NFR BLD AUTO: 84.8 % — HIGH (ref 43–77)
OVALOCYTES BLD QL SMEAR: SLIGHT — SIGNIFICANT CHANGE UP
PLAT MORPH BLD: NORMAL — SIGNIFICANT CHANGE UP
PLATELET # BLD AUTO: 364 K/UL — SIGNIFICANT CHANGE UP (ref 150–400)
POIKILOCYTOSIS BLD QL AUTO: SLIGHT — SIGNIFICANT CHANGE UP
POLYCHROMASIA BLD QL SMEAR: SLIGHT — SIGNIFICANT CHANGE UP
POTASSIUM SERPL-MCNC: 4.6 MMOL/L — SIGNIFICANT CHANGE UP (ref 3.5–5.3)
POTASSIUM SERPL-SCNC: 4.6 MMOL/L — SIGNIFICANT CHANGE UP (ref 3.5–5.3)
PROT SERPL-MCNC: 5.7 GM/DL — LOW (ref 6–8.3)
RBC # BLD: 3.23 M/UL — LOW (ref 4.2–5.8)
RBC # FLD: 20.2 % — HIGH (ref 10.3–14.5)
RBC BLD AUTO: (no result)
SODIUM SERPL-SCNC: 138 MMOL/L — SIGNIFICANT CHANGE UP (ref 135–145)
SPHEROCYTES BLD QL SMEAR: SLIGHT — SIGNIFICANT CHANGE UP
STOMATOCYTES BLD QL SMEAR: PRESENT — SIGNIFICANT CHANGE UP
WBC # BLD: 10.8 K/UL — HIGH (ref 3.8–10.5)
WBC # FLD AUTO: 10.8 K/UL — HIGH (ref 3.8–10.5)

## 2018-02-16 RX ORDER — DOCUSATE SODIUM 100 MG
2 CAPSULE ORAL
Qty: 0 | Refills: 0 | DISCHARGE
Start: 2018-02-16

## 2018-02-16 RX ORDER — PANTOPRAZOLE SODIUM 20 MG/1
1 TABLET, DELAYED RELEASE ORAL
Qty: 0 | Refills: 0 | COMMUNITY
Start: 2018-02-16

## 2018-02-16 RX ORDER — POLYETHYLENE GLYCOL 3350 17 G/17G
17 POWDER, FOR SOLUTION ORAL
Qty: 0 | Refills: 0 | COMMUNITY
Start: 2018-02-16

## 2018-02-16 RX ORDER — HYDROXYUREA 500 MG/1
1 CAPSULE ORAL
Qty: 0 | Refills: 0 | COMMUNITY

## 2018-02-16 RX ORDER — AMLODIPINE BESYLATE 2.5 MG/1
1 TABLET ORAL
Qty: 0 | Refills: 0 | COMMUNITY
Start: 2018-02-16

## 2018-02-16 RX ORDER — MAGNESIUM HYDROXIDE 400 MG/1
30 TABLET, CHEWABLE ORAL
Qty: 0 | Refills: 0 | COMMUNITY

## 2018-02-16 RX ORDER — DOCUSATE SODIUM 100 MG
1 CAPSULE ORAL
Qty: 0 | Refills: 0 | DISCHARGE
Start: 2018-02-16

## 2018-02-16 RX ORDER — AMLODIPINE BESYLATE 2.5 MG/1
1 TABLET ORAL
Qty: 0 | Refills: 0 | COMMUNITY

## 2018-02-16 RX ORDER — HYDROXYUREA 500 MG/1
1 CAPSULE ORAL
Qty: 0 | Refills: 0 | COMMUNITY
Start: 2018-02-16

## 2018-02-16 RX ADMIN — PANTOPRAZOLE SODIUM 40 MILLIGRAM(S): 20 TABLET, DELAYED RELEASE ORAL at 06:31

## 2018-02-16 RX ADMIN — Medication 100 MILLIGRAM(S): at 05:15

## 2018-02-16 RX ADMIN — Medication 100 MILLIGRAM(S): at 17:25

## 2018-02-16 RX ADMIN — Medication 1 MILLIGRAM(S): at 11:48

## 2018-02-16 RX ADMIN — AMLODIPINE BESYLATE 10 MILLIGRAM(S): 2.5 TABLET ORAL at 05:15

## 2018-02-16 RX ADMIN — POLYETHYLENE GLYCOL 3350 17 GRAM(S): 17 POWDER, FOR SOLUTION ORAL at 11:42

## 2018-02-16 RX ADMIN — HYDROXYUREA 500 MILLIGRAM(S): 500 CAPSULE ORAL at 11:49

## 2018-02-16 NOTE — DISCHARGE NOTE ADULT - MEDICATION SUMMARY - MEDICATIONS TO STOP TAKING
I will STOP taking the medications listed below when I get home from the hospital:    aspirin 81 mg oral delayed release tablet  -- 1 tab(s) by mouth once a day    bisacodyl 10 mg rectal suppository  -- 1 suppository(ies) rectally once a day, As Needed    Milk of Magnesia 8% oral suspension  -- 30 milliliter(s) by mouth once a day (at bedtime), As Needed    Eliquis 2.5 mg oral tablet  -- 1 tab(s) by mouth 2 times a day    Tamiflu 30 mg oral capsule  -- 1 cap(s) by mouth once a day for 14 days
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
## 2018-02-16 NOTE — DISCHARGE NOTE ADULT - PATIENT PORTAL LINK FT
You can access the [facility] Patient Portal, offered by [facility], by registering with the following website: [email or web address]

## 2018-02-16 NOTE — DISCHARGE NOTE ADULT - PROVIDER TOKENS
FREE:[LAST:[lili],PHONE:[(   )    -],FAX:[(   )    -],ADDRESS:[[facility]]],TOKEN:'[id number]',TOKEN:'[email or web address]'

## 2018-02-16 NOTE — DISCHARGE NOTE ADULT - CARE PLAN
Principal Discharge DX:	GI bleed  Goal:	hold asa until seen by GI and cardio, patient for outpatient  elective endoscopy  Assessment and plan of treatment:	hold asa until seen by GI and cardio, patient for outpatient  elective endoscopy  Secondary Diagnosis:	DVT (deep venous thrombosis)  Goal:	s/p IVC filter 2/15/18,  gauze dressing to right IJ site can be removed in 72 hours per IR nurse , eliquis discontinued  Assessment and plan of treatment:	s/p IVC filter 2/15/18,  gauze dressing to right IJ site can be removed in 72 hours per IR nurse , eliquis discontinued   if IVC filter no longer needed see vascular or IR for retreival (IVC filter done by Dr Griffin ludwig-IR)

## 2018-02-16 NOTE — DISCHARGE NOTE ADULT - CARE PROVIDERS DIRECT ADDRESSES
,DirectAddress_Unknown,[email or web address],DirectAddress_Unknown

## 2018-02-16 NOTE — DISCHARGE NOTE ADULT - HOSPITAL COURSE
89 y.o. male with PMH CKD stage 3 polycythemia on hydroxyurea, HTN, h/o back surgery, L DVT diagnosed 13 jan 2018 on Eliquis admited through ER  from  [facility] rehab with occult GI bleed and low hb 7.07, BP 98/60 in ED,no associated BRBPR, kerry or abdominal pain, no hematemesis. Patient received 2 units PRBC ,eliquis  and asa held .no episodes of gross GI bleed inpatient, Gi- Dr gracia plan for outpatient elective upper endoscopy and  colonoscopy, Hb at time of discharge stable at  Hb 9/ [id number]  Dute to hx of recent DVT in jan/2018- IVC fiolter was placed by IR- Dr Bro Moya 2/15/18  His course complicated by JOSE on CKD which improved with slow IV hydration to somewhat baseline   started on po ppi ,tolerating po diet       Constitutional: Well appearing  HEENT: Atraumatic, DARRELL, Normal, No congestion  Respiratory: Breath Sounds normal, no rhonchi/wheeze  Cardiovascular: N S1S2; VALERIY present  Gastrointestinal: Abdomen soft, non tender, Bowel Ssounds present  Extremities: No edema, peripheral pulses present  Neurological: AAO x 3, no gross focal motor deficits  Skin: Non cellulitic, no rash, ulcers  Lymph Nodes: No lymphadenopathy noted  Back: No CVA tenderness   Musculoskeletal: non tender  Breasts: Deferred  Genitourinary: deferred  Rectal: Deferred    A/P  89 y.o. male with PMH polycythemia on hydroxyurea, HTN, h/o back surgery, L DVT on Eliquis presents from [facility] with drop in Hb. Pt was found to have Hb 7.07 and BP 98/60 and sent to [facility] for further eval. Pt was at [facility] for rehab.     #occult GI bleed  /slow drop in h/h since jan 2018- Anemia  # L DVT placed IVC filter 2/15/18 by IR  -Admitted to med surg  -GI to scope patient as outpatient  -Gi consult appreciated   -s/p IVC filter for L DVT 2/15/18  -PPI po started  -held asa 81mg until seen by Gi and cardio as outpatient in 1 week  d/c eliquis    #diastolic dysfunction, aortic stenosis- not in acute heart failure at this time   -echo (1/16/18) EF 55% mild AR, moderate AS, mod MR, EA reversal, mild TR, mild CO  -i/o, daily weight    #thrombocytosis, polycythemia    changed dose of hydroxyurea by heme-keep PLT<500     #JOSE on CKD3- JOSE secondary to hypovolemia-resolved  -stable   -on prior admission, Cr range 1.11-2.15  now Cr 1.63  f/u with nephrology as outpatient         #HTN  -on norvasc with holding parameter    #hx lung opacity  -outpt pulm follow up    # constipation  -miralax, colace, senna prn     #DVT ppx  now with IVC filter    #Advanced Care Planning  -Full code 89 y.o. male with PMH CKD stage 3 polycythemia on hydroxyurea, HTN, h/o back surgery, L DVT diagnosed 13 jan 2018 on Eliquis admited through ER  from  [facility] rehab with occult GI bleed and low hb 7.07, BP 98/60 in ED,no associated BRBPR, kerry or abdominal pain, no hematemesis. Patient received 2 units PRBC ,eliquis  and asa held .no episodes of gross GI bleed inpatient, Gi- Dr gracia plan for outpatient elective upper endoscopy and  colonoscopy, Hb at time of discharge stable at  Hb 9/ [id number]  Dute to hx of recent DVT in jan/2018- IVC fiolter was placed by IR- Dr Bro Moya 2/15/18  His course complicated by JOSE on CKD which improved with slow IV hydration to somewhat baseline   started on po ppi ,tolerating po diet  being discharged to rehab       Constitutional: Well appearing  HEENT: Atraumatic, DARRELL, Normal, No congestion  Respiratory: Breath Sounds normal, no rhonchi/wheeze  Cardiovascular: N S1S2; VALERIY present  Gastrointestinal: Abdomen soft, non tender, Bowel Ssounds present  Extremities: No edema, peripheral pulses present  Neurological: AAO x 3, no gross focal motor deficits  Skin: Non cellulitic, no rash, ulcers  Lymph Nodes: No lymphadenopathy noted  Back: No CVA tenderness   Musculoskeletal: non tender  Breasts: Deferred  Genitourinary: deferred  Rectal: Deferred    A/P  89 y.o. male with PMH polycythemia on hydroxyurea, HTN, h/o back surgery, L DVT on Eliquis presents from [facility] with drop in Hb. Pt was found to have Hb 7.07 and BP 98/60 and sent to [facility] for further eval. Pt was at [facility] for rehab.     #occult GI bleed  /slow drop in h/h since jan 2018- Anemia  # L DVT placed IVC filter 2/15/18 by IR  -Admitted to med surg  -GI to scope patient as outpatient  -Gi consult appreciated   -s/p IVC filter for L DVT 2/15/18  -PPI po started  -held asa 81mg until seen by Gi and cardio as outpatient in 1 week  d/c eliquis    #diastolic dysfunction, aortic stenosis- not in acute heart failure at this time   -echo (1/16/18) EF 55% mild AR, moderate AS, mod MR, EA reversal, mild TR, mild MI  -i/o, daily weight    #thrombocytosis, polycythemia    changed dose of hydroxyurea by heme-keep PLT<500     #JOSE on CKD3- JOSE secondary to hypovolemia-resolved  -stable   -on prior admission, Cr range 1.11-2.15  now Cr 1.63  f/u with nephrology as outpatient         #HTN  -on norvasc with holding parameter    #hx lung opacity  -outpt pulm follow up    # constipation  -miralax, colace, senna prn     #DVT ppx  now with IVC filter    #Advanced Care Planning  -Full code

## 2018-02-16 NOTE — DISCHARGE NOTE ADULT - CARE PROVIDER_API CALL
petra pearson  Phone: (   )    -  Fax: (   )    -    Mohit Hermosillo (MD), Cardiovascular Disease  [address] [place] [address]  Phone: [phone]  Fax: [phone]    Elmer Osborn), Gastroenterology; Internal Medicine  [address] [place] [address]  Phone: [phone]  Fax: [phone]

## 2018-02-16 NOTE — DISCHARGE NOTE ADULT - MEDICATION SUMMARY - MEDICATIONS TO TAKE
I will START or STAY ON the medications listed below when I get home from the hospital:    hydroxyurea 500 mg oral capsule  -- 1 cap(s) by mouth once a day  -- Indication: For Thrombocytosis    amLODIPine 10 mg oral tablet  -- 1 tab(s) by mouth once a day  -- Indication: For HTN    docusate sodium 100 mg oral capsule  -- 1 cap(s) by mouth 2 times a day  -- Indication: For chronic constipation    polyethylene glycol 3350 oral powder for reconstitution  -- 17 gram(s) by mouth once a day  -- Indication: For chronic constipation    pantoprazole 40 mg oral delayed release tablet  -- 1 tab(s) by mouth once a day (before a meal)  -- Indication: For GERD    folic acid 1 mg oral tablet  -- 1 tab(s) by mouth once a day  -- Indication: For vitamin

## 2018-02-16 NOTE — CHART NOTE - NSCHARTNOTEFT_GEN_A_CORE
spoke with Dr Hermosillo regarding patient being discharged to [facility] rehab and held asa81 until sees GI as outpatient.PMD agreed.

## 2018-02-16 NOTE — PROGRESS NOTE ADULT - ASSESSMENT
89 y/o elderly male with anemia, + occult stool s/p IVC filter due to DVT.     Plan:  Tolerating diet  PO PPIs  Miralax daily    Will no longer follow daily, pt to follow up outpt for further workup.    Discussed with Dr. Osborn.
89 y.o. male with PMH polycythemia on hydroxyurea, HTN, h/o back surgery, L DVT on Eliquis presents from [facility] with drop in Hb. Pt was found to have Hb 7.07 and BP 98/60 and sent to [facility] for further eval. Pt was at [facility] for rehab.     #occult GI bleed  /slow drop in h/h since jan 2018- on eliquis -now h/H stable off eliquis  # L DVT placed IVC filter 2/15/18 by IR  -Admitted to med surg  -GI to scope patient as outpatient  -Gi consult appreciated   -s/p IVC filter for L DVT 2/15/18  -monitor h/h  -transfuse PRBC prn  -chnage IV ppi drip to PPI po  -started on clears by GI will advance diet today  -D/c eliquis, would speak with GI and cardio about resuming plavix            #diastolic dysfunction, aortic stenosis- not in acute heart failure at this time   -echo (1/16/18) EF 55% mild AR, moderate AS, mod MR, EA reversal, mild TR, mild IN  -i/o, daily weight    #thrombocytosis, polycythemia  - discussed with hematology,  -resumed hydroxyurea by heme-keep PLT<500     #JOSE on CKD3- JOSE secondary to hypovolemia-improved   -on prior admission, Cr range 1.11-2.15  -if tolerates po D/c IVF      #HTN  -on norvasc with holding parameter    #hx lung opacity  -outpt pulm follow up    # constipation  -miralax, colace, senna prn     #DVT ppx  now with IVC filter    #Advanced Care Planning  -Discussed in detail regarding advanced directives with patient. Pt reports wanting to continue to live if possible. Reports FULL code
Imp:  Anemia, guaiac +  S/P IVC filter    Rec:  Change protonix to PO  Hopefully can defer endoscopic evaluation
89 y.o. male with PMH polycythemia on hydroxyurea, HTN, h/o back surgery, L DVT on Eliquis presents from [facility] with drop in Hb. Pt was found to have Hb 7.07 and BP 98/60 and sent to [facility] for further eval. Pt was at [facility] for rehab.     #occult GI bleed  /slow drop in h/h since jan 2018- on eliquis   # L DVT on eliquis  -Admitted to med surg  -GI to scope patient as outpatient  -Gi consult appreciated   -would need IVC filter for L DVT  -if IVC filter procedure delayed will place patient on IV heparin drip transiently  -check h/H q 6   -transfused PRBC prn  -continue PPI  -started on clears by GI  -hold eliquis and antiplatelet  -npo after midnight for IVC filte          #diastolic dysfunction, aortic stenosis- not in acute heart failure at this time   -echo (1/16/18) EF 55% mild AR, moderate AS, mod MR, EA reversal, mild TR, mild DE  -i/o, daily weight    #thrombocytosis, polycythemia  - discussed with hematology,  -hematology would resume hydroxyurea     #JOSE on CKD3- JOSE secondary to hypovolemia  -on prior admission, Cr range 1.11-2.15  -currently, Cr 1.63  -slow IV hydration     #HTN  -on norvasc with holding parameter    #hx lung opacity  -outpt pulm follow up    # constipation  -miralax, colace, senna prn     #DVT ppx  -SCDs    #Advanced Care Planning  -Discussed in detail regarding advanced directives with patient. Pt reports wanting to continue to live if possible. Reports FULL code

## 2018-02-16 NOTE — DISCHARGE NOTE ADULT - MEDICATION SUMMARY - MEDICATIONS TO CHANGE
I will SWITCH the dose or number of times a day I take the medications listed below when I get home from the hospital:    hydroxyurea 500 mg oral capsule  -- 2 cap(s) by mouth 3 times a week on monday, wednesday, friday    hydroxyurea 500 mg oral capsule  -- 1 cap(s) by mouth 4 times a week on Sunday, Tuesday, Thursday, Saturday

## 2018-02-16 NOTE — DISCHARGE NOTE ADULT - PLAN OF CARE
hold asa until seen by GI and cardio, patient for outpatient  elective endoscopy s/p IVC filter 2/15/18,  gauze dressing to right IJ site can be removed in 72 hours per IR nurse , eliquis discontinued s/p IVC filter 2/15/18,  gauze dressing to right IJ site can be removed in 72 hours per IR nurse , eliquis discontinued   if IVC filter no longer needed see vascular or IR for retreival (IVC filter done by Dr Griffin ludwig-IR)

## 2018-02-23 DIAGNOSIS — I10 ESSENTIAL (PRIMARY) HYPERTENSION: ICD-10-CM

## 2018-02-23 DIAGNOSIS — Z79.82 LONG TERM (CURRENT) USE OF ASPIRIN: ICD-10-CM

## 2018-02-23 DIAGNOSIS — I82.521 CHRONIC EMBOLISM AND THROMBOSIS OF RIGHT ILIAC VEIN: ICD-10-CM

## 2018-02-23 DIAGNOSIS — N18.3 CHRONIC KIDNEY DISEASE, STAGE 3 (MODERATE): ICD-10-CM

## 2018-02-23 DIAGNOSIS — C94.6 MYELODYSPLASTIC DISEASE, NOT ELSEWHERE CLASSIFIED: ICD-10-CM

## 2018-02-23 DIAGNOSIS — D75.1 SECONDARY POLYCYTHEMIA: ICD-10-CM

## 2018-02-23 DIAGNOSIS — I35.0 NONRHEUMATIC AORTIC (VALVE) STENOSIS: ICD-10-CM

## 2018-02-23 DIAGNOSIS — Z79.01 LONG TERM (CURRENT) USE OF ANTICOAGULANTS: ICD-10-CM

## 2018-02-23 DIAGNOSIS — K92.2 GASTROINTESTINAL HEMORRHAGE, UNSPECIFIED: ICD-10-CM

## 2018-02-23 DIAGNOSIS — D50.0 IRON DEFICIENCY ANEMIA SECONDARY TO BLOOD LOSS (CHRONIC): ICD-10-CM

## 2018-02-23 DIAGNOSIS — D47.3 ESSENTIAL (HEMORRHAGIC) THROMBOCYTHEMIA: ICD-10-CM

## 2018-02-23 DIAGNOSIS — K59.00 CONSTIPATION, UNSPECIFIED: ICD-10-CM

## 2018-03-13 ENCOUNTER — CLINICAL ADVICE (OUTPATIENT)
Age: 83
End: 2018-03-13

## 2018-03-28 ENCOUNTER — NON-APPOINTMENT (OUTPATIENT)
Age: 83
End: 2018-03-28

## 2018-03-28 ENCOUNTER — APPOINTMENT (OUTPATIENT)
Dept: CARDIOLOGY | Facility: CLINIC | Age: 83
End: 2018-03-28
Payer: MEDICARE

## 2018-03-28 VITALS
WEIGHT: 140 LBS | DIASTOLIC BLOOD PRESSURE: 75 MMHG | HEIGHT: 65 IN | OXYGEN SATURATION: 99 % | HEART RATE: 81 BPM | SYSTOLIC BLOOD PRESSURE: 131 MMHG | BODY MASS INDEX: 23.32 KG/M2

## 2018-03-28 PROCEDURE — 93000 ELECTROCARDIOGRAM COMPLETE: CPT

## 2018-03-28 PROCEDURE — 99214 OFFICE O/P EST MOD 30 MIN: CPT | Mod: 25

## 2018-04-06 ENCOUNTER — APPOINTMENT (OUTPATIENT)
Dept: NEUROLOGY | Facility: CLINIC | Age: 83
End: 2018-04-06

## 2018-04-25 ENCOUNTER — APPOINTMENT (OUTPATIENT)
Dept: CARDIOLOGY | Facility: CLINIC | Age: 83
End: 2018-04-25
Payer: MEDICARE

## 2018-04-25 ENCOUNTER — LABORATORY RESULT (OUTPATIENT)
Age: 83
End: 2018-04-25

## 2018-04-25 VITALS
OXYGEN SATURATION: 98 % | SYSTOLIC BLOOD PRESSURE: 134 MMHG | BODY MASS INDEX: 23.32 KG/M2 | HEART RATE: 88 BPM | WEIGHT: 140 LBS | DIASTOLIC BLOOD PRESSURE: 70 MMHG | HEIGHT: 65 IN

## 2018-04-25 DIAGNOSIS — Z00.00 ENCOUNTER FOR GENERAL ADULT MEDICAL EXAMINATION W/OUT ABNORMAL FINDINGS: ICD-10-CM

## 2018-04-25 DIAGNOSIS — I65.22 OCCLUSION AND STENOSIS OF LEFT CAROTID ARTERY: ICD-10-CM

## 2018-04-25 PROCEDURE — 93000 ELECTROCARDIOGRAM COMPLETE: CPT

## 2018-04-25 PROCEDURE — 99214 OFFICE O/P EST MOD 30 MIN: CPT | Mod: 25

## 2018-04-25 RX ORDER — POLYETHYLENE GLYCOL 3350 17 G/17G
17 POWDER, FOR SOLUTION ORAL
Qty: 527 | Refills: 0 | Status: ACTIVE | COMMUNITY
Start: 2018-03-01

## 2018-04-26 LAB
ALBUMIN SERPL ELPH-MCNC: 4.1 G/DL
ALP BLD-CCNC: 59 U/L
ALT SERPL-CCNC: 6 U/L
ANION GAP SERPL CALC-SCNC: 15 MMOL/L
AST SERPL-CCNC: 12 U/L
BASOPHILS # BLD AUTO: 0.26 K/UL
BASOPHILS NFR BLD AUTO: 3.6 %
BILIRUB SERPL-MCNC: 0.2 MG/DL
BUN SERPL-MCNC: 31 MG/DL
CALCIUM SERPL-MCNC: 9.2 MG/DL
CHLORIDE SERPL-SCNC: 107 MMOL/L
CO2 SERPL-SCNC: 22 MMOL/L
CREAT SERPL-MCNC: 1.64 MG/DL
EOSINOPHIL # BLD AUTO: 0.2 K/UL
EOSINOPHIL NFR BLD AUTO: 2.7 %
GLUCOSE SERPL-MCNC: 91 MG/DL
HCT VFR BLD CALC: 37.7 %
HGB BLD-MCNC: 10.5 G/DL
LYMPHOCYTES # BLD AUTO: 0.6 K/UL
LYMPHOCYTES NFR BLD AUTO: 8.1 %
MAN DIFF?: NORMAL
MCHC RBC-ENTMCNC: 23.3 PG
MCHC RBC-ENTMCNC: 27.9 GM/DL
MCV RBC AUTO: 83.8 FL
MONOCYTES # BLD AUTO: 0.2 K/UL
MONOCYTES NFR BLD AUTO: 2.7 %
NEUTROPHILS # BLD AUTO: 5.96 K/UL
NEUTROPHILS NFR BLD AUTO: 81.1 %
PLATELET # BLD AUTO: 245 K/UL
POTASSIUM SERPL-SCNC: 5.5 MMOL/L
PROT SERPL-MCNC: 6.9 G/DL
RBC # BLD: 4.5 M/UL
RBC # FLD: 23.4 %
SODIUM SERPL-SCNC: 144 MMOL/L
WBC # FLD AUTO: 7.35 K/UL

## 2018-05-02 RX ORDER — FOLIC ACID 1 MG/1
1 TABLET ORAL DAILY
Qty: 90 | Refills: 0 | Status: ACTIVE | COMMUNITY
Start: 1900-01-01 | End: 1900-01-01

## 2018-05-04 ENCOUNTER — MEDICATION RENEWAL (OUTPATIENT)
Age: 83
End: 2018-05-04

## 2018-05-04 ENCOUNTER — NON-APPOINTMENT (OUTPATIENT)
Age: 83
End: 2018-05-04

## 2018-05-04 RX ORDER — PANTOPRAZOLE 40 MG/1
40 TABLET, DELAYED RELEASE ORAL DAILY
Qty: 30 | Refills: 0 | Status: ACTIVE | COMMUNITY
Start: 1900-01-01 | End: 1900-01-01

## 2018-07-10 ENCOUNTER — APPOINTMENT (OUTPATIENT)
Dept: HEMATOLOGY ONCOLOGY | Facility: CLINIC | Age: 83
End: 2018-07-10
Payer: MEDICARE

## 2018-07-10 ENCOUNTER — LABORATORY RESULT (OUTPATIENT)
Age: 83
End: 2018-07-10

## 2018-07-10 VITALS
SYSTOLIC BLOOD PRESSURE: 130 MMHG | DIASTOLIC BLOOD PRESSURE: 75 MMHG | HEART RATE: 78 BPM | HEIGHT: 65 IN | WEIGHT: 137.38 LBS | TEMPERATURE: 98.6 F | BODY MASS INDEX: 22.89 KG/M2

## 2018-07-10 LAB
HCT VFR BLD CALC: 36.8 %
HGB BLD-MCNC: 10.5 G/DL
MCHC RBC-ENTMCNC: 21.9 PG
MCHC RBC-ENTMCNC: 28.5 GM/DL
MCV RBC AUTO: 76.8 FL
PLATELET # BLD AUTO: 978 K/UL
RBC # BLD: 4.8 M/UL
RBC # FLD: 19.9 %
WBC # FLD AUTO: 17 K/UL

## 2018-07-10 PROCEDURE — 85025 COMPLETE CBC W/AUTO DIFF WBC: CPT

## 2018-07-10 PROCEDURE — 99214 OFFICE O/P EST MOD 30 MIN: CPT | Mod: 25

## 2018-07-10 PROCEDURE — 36415 COLL VENOUS BLD VENIPUNCTURE: CPT

## 2018-07-12 ENCOUNTER — APPOINTMENT (OUTPATIENT)
Dept: CARDIOLOGY | Facility: CLINIC | Age: 83
End: 2018-07-12
Payer: MEDICARE

## 2018-07-12 ENCOUNTER — NON-APPOINTMENT (OUTPATIENT)
Age: 83
End: 2018-07-12

## 2018-07-12 VITALS
RESPIRATION RATE: 14 BRPM | DIASTOLIC BLOOD PRESSURE: 82 MMHG | HEART RATE: 80 BPM | SYSTOLIC BLOOD PRESSURE: 140 MMHG | BODY MASS INDEX: 2.17 KG/M2 | WEIGHT: 13 LBS | TEMPERATURE: 97.6 F | OXYGEN SATURATION: 98 % | HEIGHT: 65 IN

## 2018-07-12 PROCEDURE — 93000 ELECTROCARDIOGRAM COMPLETE: CPT

## 2018-07-12 PROCEDURE — 99214 OFFICE O/P EST MOD 30 MIN: CPT | Mod: 25

## 2018-08-15 ENCOUNTER — LABORATORY RESULT (OUTPATIENT)
Age: 83
End: 2018-08-15

## 2018-08-15 ENCOUNTER — APPOINTMENT (OUTPATIENT)
Dept: HEMATOLOGY ONCOLOGY | Facility: CLINIC | Age: 83
End: 2018-08-15
Payer: MEDICARE

## 2018-08-15 VITALS
WEIGHT: 131 LBS | DIASTOLIC BLOOD PRESSURE: 65 MMHG | TEMPERATURE: 98.2 F | HEIGHT: 65 IN | BODY MASS INDEX: 21.83 KG/M2 | SYSTOLIC BLOOD PRESSURE: 112 MMHG | HEART RATE: 93 BPM

## 2018-08-15 DIAGNOSIS — D50.9 IRON DEFICIENCY ANEMIA, UNSPECIFIED: ICD-10-CM

## 2018-08-15 PROBLEM — I82.521: Chronic | Status: ACTIVE | Noted: 2018-02-04

## 2018-08-15 LAB
HCT VFR BLD CALC: 34.3 %
HGB BLD-MCNC: 10 G/DL
MCHC RBC-ENTMCNC: 22.2 PG
MCHC RBC-ENTMCNC: 29.2 GM/DL
MCV RBC AUTO: 76.1 FL
PLATELET # BLD AUTO: 412 K/UL
RBC # BLD: 4.51 M/UL
RBC # FLD: 23.6 %
WBC # FLD AUTO: 7.4 K/UL

## 2018-08-15 PROCEDURE — 85025 COMPLETE CBC W/AUTO DIFF WBC: CPT

## 2018-08-15 PROCEDURE — 99214 OFFICE O/P EST MOD 30 MIN: CPT | Mod: 25

## 2018-08-15 PROCEDURE — 36415 COLL VENOUS BLD VENIPUNCTURE: CPT

## 2018-08-20 LAB
ALBUMIN SERPL ELPH-MCNC: 4.2 G/DL
ALP BLD-CCNC: 56 U/L
ALT SERPL-CCNC: 5 U/L
ANION GAP SERPL CALC-SCNC: 14 MMOL/L
AST SERPL-CCNC: 13 U/L
BILIRUB SERPL-MCNC: 0.4 MG/DL
BUN SERPL-MCNC: 38 MG/DL
CALCIUM SERPL-MCNC: 8.9 MG/DL
CHLORIDE SERPL-SCNC: 105 MMOL/L
CO2 SERPL-SCNC: 20 MMOL/L
CREAT SERPL-MCNC: 1.72 MG/DL
FERRITIN SERPL-MCNC: 16 NG/ML
FOLATE SERPL-MCNC: 6.9 NG/ML
GLUCOSE SERPL-MCNC: 95 MG/DL
IRON SATN MFR SERPL: 6 %
IRON SERPL-MCNC: 22 UG/DL
POTASSIUM SERPL-SCNC: 4.7 MMOL/L
PROT SERPL-MCNC: 6.4 G/DL
SODIUM SERPL-SCNC: 140 MMOL/L
TIBC SERPL-MCNC: 343 UG/DL
UIBC SERPL-MCNC: 321 UG/DL
VIT B12 SERPL-MCNC: 687 PG/ML

## 2018-09-07 ENCOUNTER — APPOINTMENT (OUTPATIENT)
Dept: HEMATOLOGY ONCOLOGY | Facility: CLINIC | Age: 83
End: 2018-09-07

## 2018-09-13 ENCOUNTER — OTHER (OUTPATIENT)
Age: 83
End: 2018-09-13

## 2018-09-13 DIAGNOSIS — D50.9 IRON DEFICIENCY ANEMIA, UNSPECIFIED: ICD-10-CM

## 2018-12-05 ENCOUNTER — APPOINTMENT (OUTPATIENT)
Dept: DERMATOLOGY | Facility: CLINIC | Age: 83
End: 2018-12-05

## 2018-12-14 ENCOUNTER — APPOINTMENT (OUTPATIENT)
Dept: HEMATOLOGY ONCOLOGY | Facility: CLINIC | Age: 83
End: 2018-12-14
Payer: MEDICARE

## 2018-12-14 ENCOUNTER — LABORATORY RESULT (OUTPATIENT)
Age: 83
End: 2018-12-14

## 2018-12-14 VITALS
SYSTOLIC BLOOD PRESSURE: 130 MMHG | WEIGHT: 134.5 LBS | DIASTOLIC BLOOD PRESSURE: 72 MMHG | BODY MASS INDEX: 22.41 KG/M2 | HEART RATE: 86 BPM | TEMPERATURE: 97.7 F | HEIGHT: 65 IN

## 2018-12-14 DIAGNOSIS — K21.9 GASTRO-ESOPHAGEAL REFLUX DISEASE W/OUT ESOPHAGITIS: ICD-10-CM

## 2018-12-14 DIAGNOSIS — I82.402 ACUTE EMBOLISM AND THROMBOSIS OF UNSPECIFIED DEEP VEINS OF LEFT LOWER EXTREMITY: ICD-10-CM

## 2018-12-14 PROCEDURE — 99213 OFFICE O/P EST LOW 20 MIN: CPT | Mod: 25

## 2018-12-14 PROCEDURE — 36415 COLL VENOUS BLD VENIPUNCTURE: CPT

## 2018-12-14 PROCEDURE — 85025 COMPLETE CBC W/AUTO DIFF WBC: CPT

## 2018-12-14 RX ORDER — HYDROXYUREA 500 MG/1
500 CAPSULE ORAL
Qty: 100 | Refills: 2 | Status: ACTIVE | COMMUNITY
Start: 2018-07-10 | End: 1900-01-01

## 2018-12-19 LAB
FERRITIN SERPL-MCNC: 38 NG/ML
HCT VFR BLD CALC: 36.3 %
HGB BLD-MCNC: 10.7 G/DL
IRON SATN MFR SERPL: 6 %
IRON SERPL-MCNC: 24 UG/DL
MCHC RBC-ENTMCNC: 24.5 PG
MCHC RBC-ENTMCNC: 29.4 GM/DL
MCV RBC AUTO: 83.2 FL
PLATELET # BLD AUTO: 282 K/UL
RBC # BLD: 4.36 M/UL
RBC # FLD: 20.4 %
TIBC SERPL-MCNC: 374 UG/DL
UIBC SERPL-MCNC: 350 UG/DL
WBC # FLD AUTO: 7 K/UL

## 2018-12-28 PROBLEM — I82.402 DEEP VEIN THROMBOSIS (DVT) OF LEFT LOWER EXTREMITY: Status: RESOLVED | Noted: 2018-07-10 | Resolved: 2018-12-28

## 2018-12-28 NOTE — PHYSICAL EXAM
[Normal] : affect appropriate [de-identified] : frail elderly male, using walker [de-identified] : anicteric [de-identified] : no thrush or mucositis [de-identified] : S1S2 RRR, + harsh VALERIY [de-identified] : no c/c/e [de-identified] : walks with walker  [de-identified] : no rashes [de-identified] : no overt focal deficits

## 2018-12-28 NOTE — REVIEW OF SYSTEMS
[Patient Intake Form Reviewed] : Patient intake form was reviewed [Loss of Hearing] : loss of hearing [Negative] : Allergic/Immunologic [Lower Ext Edema] : no lower extremity edema [FreeTextEntry7] : not eating as much as he used to [FreeTextEntry8] : frequent urination- chronic; nocturia [FreeTextEntry9] : memory loss, baseline spine pains [de-identified] : occasional tremors "from weak spine" [de-identified] : sleep problems due to nocturia

## 2018-12-28 NOTE — ASSESSMENT
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 \par Has been on Hydrea - now mostly for elevated platelets.\par Patient's Hydrea dose and what we have on record differ - but he states he is on  500m tab M-Thurs and 2 on F,S,S = 8 tabs a week.  (we had 2 tabs M-Thurs and 3 rest of week). \par Platelets now 282K.  Case reviewed with Dr. Lau.  Will have patient reduce to 1 tab a week.  Hydrea renewed at this dose. 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 Of note Ferritin was low at 16 in August and he was instructed to take PO iron but never did (forgot). \par Hx of DVT 2018 - off AC due to GI bleeding, s/p IVC filter. \par Return visit in 6 weeks.  Due to his age I offered patient home draws - he refused, would rather come to office for labs. \par \par Dr. Mohit Lindsey

## 2018-12-28 NOTE — RESULTS/DATA
[FreeTextEntry1] : WBC: 7.0  ANC: 5.84   Hgb: 10.7  Hct: 36.3   MCV: 83.2   Plts: 282\par Ferritin, TSAT: pending

## 2018-12-28 NOTE — HISTORY OF PRESENT ILLNESS
[de-identified] : EFRAÍN LEVY is a 89 y.o. who we are following for a MPD (PV) - on HU.  \par Diagnosed with P vera over 25 years ago. Treated at [facility], s/p multiple phlebotomies in the past.\par Currently on Hydrea for several years. \par No phlebotomy in few years.\par Diagnosed with LLE DVT early Jan 2018.\par Hospitalized in later January 2018 with Hgb, evidence of GI bleeding. Transfused, anticoagulation stopped. Had IVC filter placed. Discharged to rehab. Home since end of March 2018. [de-identified] : Current Hydrea dose 500mg M-Thurs and then 2 on Fri, Sat, Sun.  \par Did not start on iron pills as was suggested - states did not have enough money at the time and then forgot.  \par Feels quite well.  Continues to live independently, but does not drive any more. \jaylon Doesn't recall all of meds - the ones on the list are for certain but may be more. \par The patient denies any changes in his family, medical, or social history since his last visit of 8/15/18.

## 2019-01-15 ENCOUNTER — OTHER (OUTPATIENT)
Age: 84
End: 2019-01-15

## 2019-01-25 ENCOUNTER — APPOINTMENT (OUTPATIENT)
Dept: HEMATOLOGY ONCOLOGY | Facility: CLINIC | Age: 84
End: 2019-01-25

## 2019-02-05 ENCOUNTER — INPATIENT (INPATIENT)
Facility: HOSPITAL | Age: 84
LOS: 5 days | Discharge: NURSING, REHAB OR LONG-TERM CARE | End: 2019-02-11
Attending: INTERNAL MEDICINE | Admitting: INTERNAL MEDICINE
Payer: MEDICARE

## 2019-02-05 VITALS
RESPIRATION RATE: 16 BRPM | HEIGHT: 65 IN | SYSTOLIC BLOOD PRESSURE: 124 MMHG | DIASTOLIC BLOOD PRESSURE: 58 MMHG | HEART RATE: 65 BPM | OXYGEN SATURATION: 96 % | TEMPERATURE: 98 F

## 2019-02-05 DIAGNOSIS — Z98.890 OTHER SPECIFIED POSTPROCEDURAL STATES: Chronic | ICD-10-CM

## 2019-02-05 LAB
ALBUMIN SERPL ELPH-MCNC: 3.6 G/DL — SIGNIFICANT CHANGE UP (ref 3.3–5)
ALP SERPL-CCNC: 82 U/L — SIGNIFICANT CHANGE UP (ref 40–120)
ALT FLD-CCNC: 18 U/L — SIGNIFICANT CHANGE UP (ref 12–78)
ANION GAP SERPL CALC-SCNC: 11 MMOL/L — SIGNIFICANT CHANGE UP (ref 5–17)
APTT BLD: 35.8 SEC — SIGNIFICANT CHANGE UP (ref 27.5–36.3)
AST SERPL-CCNC: 18 U/L — SIGNIFICANT CHANGE UP (ref 15–37)
BASOPHILS # BLD AUTO: 0.26 K/UL — HIGH (ref 0–0.2)
BASOPHILS NFR BLD AUTO: 1.7 % — SIGNIFICANT CHANGE UP (ref 0–2)
BILIRUB SERPL-MCNC: 0.4 MG/DL — SIGNIFICANT CHANGE UP (ref 0.2–1.2)
BLD GP AB SCN SERPL QL: SIGNIFICANT CHANGE UP
BUN SERPL-MCNC: 34 MG/DL — HIGH (ref 7–23)
CALCIUM SERPL-MCNC: 8.8 MG/DL — SIGNIFICANT CHANGE UP (ref 8.5–10.1)
CHLORIDE SERPL-SCNC: 108 MMOL/L — SIGNIFICANT CHANGE UP (ref 96–108)
CO2 SERPL-SCNC: 23 MMOL/L — SIGNIFICANT CHANGE UP (ref 22–31)
CREAT SERPL-MCNC: 1.97 MG/DL — HIGH (ref 0.5–1.3)
EOSINOPHIL # BLD AUTO: 0.27 K/UL — SIGNIFICANT CHANGE UP (ref 0–0.5)
EOSINOPHIL NFR BLD AUTO: 1.7 % — SIGNIFICANT CHANGE UP (ref 0–6)
GLUCOSE BLDC GLUCOMTR-MCNC: 121 MG/DL — HIGH (ref 70–99)
GLUCOSE SERPL-MCNC: 132 MG/DL — HIGH (ref 70–99)
HCT VFR BLD CALC: 44.2 % — SIGNIFICANT CHANGE UP (ref 39–50)
HGB BLD-MCNC: 12.9 G/DL — LOW (ref 13–17)
IMM GRANULOCYTES NFR BLD AUTO: 1.6 % — HIGH (ref 0–1.5)
INR BLD: 1.05 RATIO — SIGNIFICANT CHANGE UP (ref 0.88–1.16)
LYMPHOCYTES # BLD AUTO: 0.76 K/UL — LOW (ref 1–3.3)
LYMPHOCYTES # BLD AUTO: 4.9 % — LOW (ref 13–44)
MCHC RBC-ENTMCNC: 24.5 PG — LOW (ref 27–34)
MCHC RBC-ENTMCNC: 29.2 GM/DL — LOW (ref 32–36)
MCV RBC AUTO: 84 FL — SIGNIFICANT CHANGE UP (ref 80–100)
MONOCYTES # BLD AUTO: 0.49 K/UL — SIGNIFICANT CHANGE UP (ref 0–0.9)
MONOCYTES NFR BLD AUTO: 3.1 % — SIGNIFICANT CHANGE UP (ref 2–14)
NEUTROPHILS # BLD AUTO: 13.58 K/UL — HIGH (ref 1.8–7.4)
NEUTROPHILS NFR BLD AUTO: 87 % — HIGH (ref 43–77)
NRBC # BLD: 0 /100 WBCS — SIGNIFICANT CHANGE UP (ref 0–0)
PLATELET # BLD AUTO: 811 K/UL — HIGH (ref 150–400)
POTASSIUM SERPL-MCNC: 4.6 MMOL/L — SIGNIFICANT CHANGE UP (ref 3.5–5.3)
POTASSIUM SERPL-SCNC: 4.6 MMOL/L — SIGNIFICANT CHANGE UP (ref 3.5–5.3)
PROT SERPL-MCNC: 7.5 GM/DL — SIGNIFICANT CHANGE UP (ref 6–8.3)
PROTHROM AB SERPL-ACNC: 11.7 SEC — SIGNIFICANT CHANGE UP (ref 10–12.9)
RBC # BLD: 5.26 M/UL — SIGNIFICANT CHANGE UP (ref 4.2–5.8)
RBC # FLD: 23.5 % — HIGH (ref 10.3–14.5)
SODIUM SERPL-SCNC: 142 MMOL/L — SIGNIFICANT CHANGE UP (ref 135–145)
TROPONIN I SERPL-MCNC: 0.06 NG/ML — HIGH (ref 0.01–0.04)
TYPE + AB SCN PNL BLD: SIGNIFICANT CHANGE UP
WBC # BLD: 15.61 K/UL — HIGH (ref 3.8–10.5)
WBC # FLD AUTO: 15.61 K/UL — HIGH (ref 3.8–10.5)

## 2019-02-05 PROCEDURE — 93010 ELECTROCARDIOGRAM REPORT: CPT

## 2019-02-05 PROCEDURE — 99285 EMERGENCY DEPT VISIT HI MDM: CPT

## 2019-02-05 PROCEDURE — 70450 CT HEAD/BRAIN W/O DYE: CPT | Mod: 26

## 2019-02-05 PROCEDURE — 71045 X-RAY EXAM CHEST 1 VIEW: CPT | Mod: 26

## 2019-02-05 RX ORDER — PANTOPRAZOLE SODIUM 20 MG/1
40 TABLET, DELAYED RELEASE ORAL
Qty: 0 | Refills: 0 | Status: DISCONTINUED | OUTPATIENT
Start: 2019-02-05 | End: 2019-02-11

## 2019-02-05 RX ORDER — DOCUSATE SODIUM 100 MG
100 CAPSULE ORAL
Qty: 0 | Refills: 0 | Status: DISCONTINUED | OUTPATIENT
Start: 2019-02-05 | End: 2019-02-11

## 2019-02-05 RX ORDER — POLYETHYLENE GLYCOL 3350 17 G/17G
17 POWDER, FOR SOLUTION ORAL DAILY
Qty: 0 | Refills: 0 | Status: DISCONTINUED | OUTPATIENT
Start: 2019-02-05 | End: 2019-02-11

## 2019-02-05 RX ORDER — HEPARIN SODIUM 5000 [USP'U]/ML
5000 INJECTION INTRAVENOUS; SUBCUTANEOUS EVERY 12 HOURS
Qty: 0 | Refills: 0 | Status: DISCONTINUED | OUTPATIENT
Start: 2019-02-05 | End: 2019-02-11

## 2019-02-05 RX ORDER — ASPIRIN/CALCIUM CARB/MAGNESIUM 324 MG
325 TABLET ORAL ONCE
Qty: 0 | Refills: 0 | Status: COMPLETED | OUTPATIENT
Start: 2019-02-05 | End: 2019-02-05

## 2019-02-05 RX ORDER — ATORVASTATIN CALCIUM 80 MG/1
40 TABLET, FILM COATED ORAL AT BEDTIME
Qty: 0 | Refills: 0 | Status: DISCONTINUED | OUTPATIENT
Start: 2019-02-05 | End: 2019-02-11

## 2019-02-05 RX ORDER — ASPIRIN/CALCIUM CARB/MAGNESIUM 324 MG
81 TABLET ORAL DAILY
Qty: 0 | Refills: 0 | Status: DISCONTINUED | OUTPATIENT
Start: 2019-02-05 | End: 2019-02-11

## 2019-02-05 RX ORDER — SODIUM CHLORIDE 9 MG/ML
1000 INJECTION INTRAMUSCULAR; INTRAVENOUS; SUBCUTANEOUS
Qty: 0 | Refills: 0 | Status: DISCONTINUED | OUTPATIENT
Start: 2019-02-05 | End: 2019-02-06

## 2019-02-05 RX ORDER — FOLIC ACID 0.8 MG
1 TABLET ORAL DAILY
Qty: 0 | Refills: 0 | Status: DISCONTINUED | OUTPATIENT
Start: 2019-02-05 | End: 2019-02-11

## 2019-02-05 RX ORDER — HYDROXYUREA 500 MG/1
500 CAPSULE ORAL DAILY
Qty: 0 | Refills: 0 | Status: DISCONTINUED | OUTPATIENT
Start: 2019-02-05 | End: 2019-02-05

## 2019-02-05 RX ORDER — HYDROXYUREA 500 MG/1
500 CAPSULE ORAL
Qty: 0 | Refills: 0 | Status: DISCONTINUED | OUTPATIENT
Start: 2019-02-05 | End: 2019-02-11

## 2019-02-05 RX ORDER — AMLODIPINE BESYLATE 2.5 MG/1
10 TABLET ORAL DAILY
Qty: 0 | Refills: 0 | Status: DISCONTINUED | OUTPATIENT
Start: 2019-02-05 | End: 2019-02-11

## 2019-02-05 RX ADMIN — HEPARIN SODIUM 5000 UNIT(S): 5000 INJECTION INTRAVENOUS; SUBCUTANEOUS at 20:36

## 2019-02-05 RX ADMIN — HYDROXYUREA 500 MILLIGRAM(S): 500 CAPSULE ORAL at 20:37

## 2019-02-05 RX ADMIN — Medication 325 MILLIGRAM(S): at 15:47

## 2019-02-05 RX ADMIN — ATORVASTATIN CALCIUM 40 MILLIGRAM(S): 80 TABLET, FILM COATED ORAL at 20:37
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
## 2019-02-05 NOTE — ED PROVIDER NOTE - OBJECTIVE STATEMENT
PT code stroke evaluated upon arrival.  89 YOM PMH DVT, HTN, polycythemia, tremor, p/w sudden onset slurred speech and aphasia noticed approx. 30 minutes ago.  Last normal unclear.  No family at bedside.  Associated left sided facial droop.  Pt was at a diner he has frequented for some time. He suffered from sudden onset aphasia and dysarthria while calling a taxi.  EMS was called. When they arrived symptoms mostly resolved, facial droop per staff at the diner was now at baseline.  In the ED no aphasia, no dysarthria, very mild facial droop. No apraxia,  agnosia,  dysphagia, dizziness, paresthesia, paralysis, unilateral weakness, sensory deficits in the face or extremities, vertigo, vision changes, headache, nausea, vomiting, or loss of consciousness.

## 2019-02-05 NOTE — ED ADULT NURSE REASSESSMENT NOTE - NS ED NURSE REASSESS COMMENT FT1
pt received from previous RN. vss. denies pain. resting comfortably. no s/s of acute distress noted. pending bed placement. will continue to monitor.

## 2019-02-05 NOTE — ED ADULT NURSE REASSESSMENT NOTE - NS ED NURSE REASSESS COMMENT FT1
EKG performed, pt has no c/o chest pain, EKG showing t wave inversion indicating ischemia, Trop .064, MD Lozano aware. EKG performed, pt has no c/o chest pain, EKG showing t wave inversion indicating ischemia, Trop .064, MD Lozano aware, pt also reported that they have been holding his aspirin d/t black stools, pt reported blood in stool that appeared black. EKG performed, pt has no c/o chest pain, EKG showing t wave inversion indicating ischemia, Trop .064, MD Lozano aware, pt also reported that they have been holding his aspirin d/t black stools, pt reported blood in stool that appeared black two days ago, MD Cat made aware, pt apprehensive about taking asa.

## 2019-02-05 NOTE — ED PROVIDER NOTE - PROGRESS NOTE DETAILS
JW TIA, JOSE, NSTEMI.  Given presentation and findings, patient requires hospitalization.  I discussed all findings from our evaluation today with the patient and the medical necessity for admission to the hospital.  I addressed expectations regarding the admission and I discussed the plan of care in detail.  I addressed all questions and concerns regarding the admission and the plan of care.  I used verbal teach back to confirm understanding.  The patient agreed with the admission and the plan of care.  Pt signed out to Dr. Fajardo.  Patient's history, vital signs, lab results, imaging, pertinent findings, and clinical condition reviewed during sign out.  At sign out patient admitted in hemodynamically stable condition in no acute distress.

## 2019-02-05 NOTE — ED ADULT NURSE NOTE - NSIMPLEMENTINTERV_GEN_ALL_ED
Implemented All Fall with Harm Risk Interventions:  [place] to call system. Call bell, personal items and telephone within reach. Instruct patient to call for assistance. Room bathroom lighting operational. Non-slip footwear when patient is off stretcher. Physically safe environment: no spills, clutter or unnecessary equipment. Stretcher in lowest position, wheels locked, appropriate side rails in place. Provide visual cue, wrist band, yellow gown, etc. Monitor gait and stability. Monitor for mental status changes and reorient to person, place, and time. Review medications for side effects contributing to fall risk. Reinforce activity limits and safety measures with patient and family. Provide visual clues: red socks.

## 2019-02-05 NOTE — CONSULT NOTE ADULT - ATTENDING COMMENTS
I saws the patient and agree with the above plan.  The patient has some chronic weakness on the right side. Left sided facial droop was documented in a previous progress note.  No tpa given.  Will admit and workup for tpa.  Daily aspirin.

## 2019-02-05 NOTE — ED ADULT NURSE NOTE - OBJECTIVE STATEMENT
pt BIBA for suspected stroke like s/s at diner including slurred speech and L facial droop.  S/s have since resolved, pt to CT aas per code stroke protocol, NSG and neurologist at bedside, pt in nad at this time, denies any s/s right now.  Pt does recall not being able to say what he wanted to sounding like expressive aphasia. pt BIBA for suspected stroke like s/s at diner including slurred speech and L facial droop.  S/s have since resolved, pt to CT as per code stroke protocol, NSG and neurologist at bedside, pt in nad at this time, denies any s/s right now.  Pt does recall not being able to say what he wanted to sounding like expressive aphasia.

## 2019-02-05 NOTE — ED PROVIDER NOTE - CARE PLAN
Principal Discharge DX:	TIA (transient ischemic attack)  Secondary Diagnosis:	NSTEMI (non-ST elevated myocardial infarction)  Secondary Diagnosis:	JOSE (acute kidney injury)

## 2019-02-05 NOTE — ED PROVIDER NOTE - NS_ ATTENDINGSCRIBEDETAILS _ED_A_ED_FT
The scribe's documentation has been prepared under my direction and personally reviewed by me in its entirety.  I confirm that the note above accurately reflects all my work, treatment, procedures, and decision making except where otherwise noted or amended by me.  Lennox Lozano M.D.

## 2019-02-05 NOTE — CONSULT NOTE ADULT - ASSESSMENT
89 year old man with h/o HTN, Polycythemia, and CKD who presents to the ED after an episode of slurred/garbled speech, pt now at baseline. NIHSS 0, not a candidate for t-PA.    #TIA  -Give  PO X1 now  -Admit to medicine, telemetry monitoring  -MRI and MRA   -Check lipids  -Dysphasia screen   -Social work consult- pt is quite disheveled and lives alone.     Discussed with Dr. York

## 2019-02-05 NOTE — ED PROVIDER NOTE - MEDICAL DECISION MAKING DETAILS
89 YOM PMH DVT, HTN, polycythemia, tremor, p/w sudden onset slurred speech and aphasia noticed approx. 30 minutes ago.  VSS WNL.  Exam NIH 0.  DDX likely TIA, possible CVA although Pt asymptomatic at this time.  CT head, code stroke given unclear presentation, symptomatic treatment, admit for MRI, risk stratification, and secondary prevention.  Reassess.
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
## 2019-02-05 NOTE — ED ADULT NURSE REASSESSMENT NOTE - NS ED NURSE REASSESS COMMENT FT1
Received patient from previous nurse. Pt is A&Ox4 VSS on RA. Pt denies any pain or discomfort at this time. Safety and comfort measures in place bed in lowest position. Hourly rounding will be done my on time. RN will continue to monitor.

## 2019-02-05 NOTE — CONSULT NOTE ADULT - SUBJECTIVE AND OBJECTIVE BOX
Patient is a 89y old  Male who presents with a chief complaint of difficulties with speech    HPI: Pt is a very pleasant 89 year old man with a PMH of Polycythemia and HTN who presented to the ED after an episode of difficulty with speech at 10:30am.  As per the patient he was at the diner and was calling for a taxi to take him home- the lady on the phone could not understand what he as saying and hung up on him. The patient was aware that he was having difficulty communicating, the diner staff who knows the patient recognized there was a problem and called for an ambulance. Unknown how long the symptoms lasted. Code stroke was called at 11:17, when seen in the CT scanner at 11:30 the patient had no difficulties with speech and could recall all the events leading up to the hospitalization. There is a question of a left sided facial droop-- as per neurology note in January 2018, a slight NLFD was present. Current NIHSS 0. Pt not a candidate for t-PA.      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      Vital Signs Last 24 Hrs  T(C): 36.7 (05 Feb 2019 11:22), Max: 36.7 (05 Feb 2019 11:22)  T(F): 98 (05 Feb 2019 11:22), Max: 98 (05 Feb 2019 11:22)  HR: 62 (05 Feb 2019 12:01) (62 - 65)  BP: 131/59 (05 Feb 2019 12:01) (124/58 - 131/59)  RR: 16 (05 Feb 2019 12:01) (16 - 16)  SpO2: 99% (05 Feb 2019 12:01) (96% - 99%)    PHYSICAL EXAM:  Constitutional: awake and alert, oriented to events leading up to ED visit, disheveled appearance, dirty clothes   HEENT: PERRLA, EOMI  Neck: Supple  Respiratory: Breath sounds are clear bilaterally  Cardiovascular: S1 and S2, regular rhythm  Gastrointestinal: soft, nontender  Extremities:  no edema  Vascular: Carotid Bruit - no  Musculoskeletal: Decreased ROM Right shoulder, arthritic changes to hands   Skin: No rashes    Neurological exam:  HF: A x O x 3. Appropriately interactive, normal affect. Speech fluent, No Aphasia or paraphasic errors. Naming /repetition intact   CN: PEARRL, EOMI, VFF, facial sensation normal, slight left NLFD, tongue midline, Palate moves equally, SCM equal bilaterally  Motor: Left upper and lower ext 5/5 no drift. +RUE weakness at the right shoulder due to chronic pain, Slight RLE weakness 4/5 due to chronic Right hip pain  Sens: Intact to light touch   Reflexes: Symmetric and normal, downgoing toes b/l  Coord:  finger to nose intact   Gait/Balance: Not tested     NIHSS: 0    Labs:                        12.9   15.61 )-----------( 811      ( 05 Feb 2019 11:39 )             44.2     02-05    142  |  108  |  34<H>  ----------------------------<  132<H>  4.6   |  23  |  1.97<H>      PT/INR - ( 05 Feb 2019 11:39 )   PT: 11.7 sec;   INR: 1.05 ratio       PTT - ( 05 Feb 2019 11:39 )  PTT:35.8 sec    RADIOLOGY:  < from: CT Brain Stroke Protocol (02.05.19 @ 11:37) >  No acute intracranial hemorrhage, mass effect or midline shift. No   interval change since head CT of 1/12/18.

## 2019-02-06 DIAGNOSIS — I10 ESSENTIAL (PRIMARY) HYPERTENSION: ICD-10-CM

## 2019-02-06 DIAGNOSIS — G45.9 TRANSIENT CEREBRAL ISCHEMIC ATTACK, UNSPECIFIED: ICD-10-CM

## 2019-02-06 DIAGNOSIS — I77.9 DISORDER OF ARTERIES AND ARTERIOLES, UNSPECIFIED: ICD-10-CM

## 2019-02-06 LAB
ANION GAP SERPL CALC-SCNC: 9 MMOL/L — SIGNIFICANT CHANGE UP (ref 5–17)
APPEARANCE UR: CLEAR — SIGNIFICANT CHANGE UP
BACTERIA # UR AUTO: ABNORMAL
BILIRUB UR-MCNC: NEGATIVE — SIGNIFICANT CHANGE UP
BUN SERPL-MCNC: 37 MG/DL — HIGH (ref 7–23)
CALCIUM SERPL-MCNC: 8.1 MG/DL — LOW (ref 8.5–10.1)
CHLORIDE SERPL-SCNC: 110 MMOL/L — HIGH (ref 96–108)
CHOLEST SERPL-MCNC: 143 MG/DL — SIGNIFICANT CHANGE UP (ref 10–199)
CO2 SERPL-SCNC: 22 MMOL/L — SIGNIFICANT CHANGE UP (ref 22–31)
COLOR SPEC: YELLOW — SIGNIFICANT CHANGE UP
CREAT SERPL-MCNC: 1.65 MG/DL — HIGH (ref 0.5–1.3)
DIFF PNL FLD: NEGATIVE — SIGNIFICANT CHANGE UP
EPI CELLS # UR: SIGNIFICANT CHANGE UP
GLUCOSE SERPL-MCNC: 78 MG/DL — SIGNIFICANT CHANGE UP (ref 70–99)
GLUCOSE UR QL: NEGATIVE MG/DL — SIGNIFICANT CHANGE UP
HBA1C BLD-MCNC: 4.7 % — SIGNIFICANT CHANGE UP (ref 4–5.6)
HCT VFR BLD CALC: 38.5 % — LOW (ref 39–50)
HDLC SERPL-MCNC: 35 MG/DL — LOW
HGB BLD-MCNC: 11.1 G/DL — LOW (ref 13–17)
KETONES UR-MCNC: NEGATIVE — SIGNIFICANT CHANGE UP
LEUKOCYTE ESTERASE UR-ACNC: NEGATIVE — SIGNIFICANT CHANGE UP
LIPID PNL WITH DIRECT LDL SERPL: 88 MG/DL — SIGNIFICANT CHANGE UP
MCHC RBC-ENTMCNC: 24 PG — LOW (ref 27–34)
MCHC RBC-ENTMCNC: 28.8 GM/DL — LOW (ref 32–36)
MCV RBC AUTO: 83.3 FL — SIGNIFICANT CHANGE UP (ref 80–100)
NITRITE UR-MCNC: NEGATIVE — SIGNIFICANT CHANGE UP
NRBC # BLD: 0 /100 WBCS — SIGNIFICANT CHANGE UP (ref 0–0)
PH UR: 6 — SIGNIFICANT CHANGE UP (ref 5–8)
PLATELET # BLD AUTO: 706 K/UL — HIGH (ref 150–400)
POTASSIUM SERPL-MCNC: 4.7 MMOL/L — SIGNIFICANT CHANGE UP (ref 3.5–5.3)
POTASSIUM SERPL-SCNC: 4.7 MMOL/L — SIGNIFICANT CHANGE UP (ref 3.5–5.3)
PROT UR-MCNC: 30 MG/DL
RBC # BLD: 4.62 M/UL — SIGNIFICANT CHANGE UP (ref 4.2–5.8)
RBC # FLD: 23 % — HIGH (ref 10.3–14.5)
RBC CASTS # UR COMP ASSIST: NEGATIVE /HPF — SIGNIFICANT CHANGE UP (ref 0–4)
SODIUM SERPL-SCNC: 141 MMOL/L — SIGNIFICANT CHANGE UP (ref 135–145)
SP GR SPEC: 1.01 — SIGNIFICANT CHANGE UP (ref 1.01–1.02)
TOTAL CHOLESTEROL/HDL RATIO MEASUREMENT: 4.1 RATIO — SIGNIFICANT CHANGE UP (ref 3.4–9.6)
TRIGL SERPL-MCNC: 100 MG/DL — SIGNIFICANT CHANGE UP (ref 10–149)
UROBILINOGEN FLD QL: NEGATIVE MG/DL — SIGNIFICANT CHANGE UP
WBC # BLD: 14.42 K/UL — HIGH (ref 3.8–10.5)
WBC # FLD AUTO: 14.42 K/UL — HIGH (ref 3.8–10.5)
WBC UR QL: SIGNIFICANT CHANGE UP

## 2019-02-06 PROCEDURE — 99223 1ST HOSP IP/OBS HIGH 75: CPT

## 2019-02-06 PROCEDURE — 99232 SBSQ HOSP IP/OBS MODERATE 35: CPT

## 2019-02-06 PROCEDURE — 93880 EXTRACRANIAL BILAT STUDY: CPT | Mod: 26

## 2019-02-06 PROCEDURE — 93306 TTE W/DOPPLER COMPLETE: CPT | Mod: 26

## 2019-02-06 RX ADMIN — AMLODIPINE BESYLATE 10 MILLIGRAM(S): 2.5 TABLET ORAL at 05:28

## 2019-02-06 RX ADMIN — HYDROXYUREA 500 MILLIGRAM(S): 500 CAPSULE ORAL at 21:37

## 2019-02-06 RX ADMIN — HYDROXYUREA 500 MILLIGRAM(S): 500 CAPSULE ORAL at 11:40

## 2019-02-06 RX ADMIN — HEPARIN SODIUM 5000 UNIT(S): 5000 INJECTION INTRAVENOUS; SUBCUTANEOUS at 08:19

## 2019-02-06 RX ADMIN — Medication 100 MILLIGRAM(S): at 05:28

## 2019-02-06 RX ADMIN — PANTOPRAZOLE SODIUM 40 MILLIGRAM(S): 20 TABLET, DELAYED RELEASE ORAL at 05:27

## 2019-02-06 RX ADMIN — Medication 81 MILLIGRAM(S): at 11:40

## 2019-02-06 RX ADMIN — Medication 1 MILLIGRAM(S): at 11:40

## 2019-02-06 RX ADMIN — SODIUM CHLORIDE 83 MILLILITER(S): 9 INJECTION INTRAMUSCULAR; INTRAVENOUS; SUBCUTANEOUS at 05:27

## 2019-02-06 RX ADMIN — HEPARIN SODIUM 5000 UNIT(S): 5000 INJECTION INTRAVENOUS; SUBCUTANEOUS at 21:37

## 2019-02-06 RX ADMIN — POLYETHYLENE GLYCOL 3350 17 GRAM(S): 17 POWDER, FOR SOLUTION ORAL at 11:40

## 2019-02-06 RX ADMIN — ATORVASTATIN CALCIUM 40 MILLIGRAM(S): 80 TABLET, FILM COATED ORAL at 21:38

## 2019-02-06 NOTE — PROGRESS NOTE ADULT - SUBJECTIVE AND OBJECTIVE BOX
INTERVAL HPI/OVERNIGHT EVENTS:  89 yo male with h/o Myeloproliferative Disorder/Thrombocytosis on Hydroxyurea, HTN, h/o back surgery, L DVT, GI bleeding, OA who presented to the ED for slurred speech. He was eating at a restaurant and he was noted to have slurred speech, 911 was called; by the time he came to the ED his speech was back to normal.  -no focal deficits noted    19- Patient seen and examined at bedside    MEDICATIONS  (STANDING):  amLODIPine   Tablet 10 milliGRAM(s) Oral daily  aspirin enteric coated 81 milliGRAM(s) Oral daily  atorvastatin 40 milliGRAM(s) Oral at bedtime  docusate sodium 100 milliGRAM(s) Oral two times a day  folic acid 1 milliGRAM(s) Oral daily  heparin  Injectable 5000 Unit(s) SubCutaneous every 12 hours  hydroxyurea 500 milliGRAM(s) Oral two times a day  pantoprazole    Tablet 40 milliGRAM(s) Oral before breakfast  polyethylene glycol 3350 17 Gram(s) Oral daily    MEDICATIONS  (PRN):      Allergies    No Known Allergies    Intolerances        CONSTITUTIONAL: No weakness, fevers or chills  EYES/ENT: No visual changes;  No vertigo or throat pain   NECK: No pain or stiffness  RESPIRATORY: No cough, wheezing, hemoptysis; No shortness of breath  CARDIOVASCULAR: No chest pain or palpitations  GASTROINTESTINAL: No abdominal or epigastric pain. No nausea, vomiting, or hematemesis; No diarrhea or constipation. No melena or hematochezia.  GENITOURINARY: No dysuria, frequency or hematuria  NEUROLOGICAL: No numbness or weakness  SKIN: No itching, burning, rashes, or lesions   All other review of systems is negative unless indicated above.    Vital Signs Last 24 Hrs  T(C): 36.7 (2019 17:07), Max: 36.7 (2019 20:23)  T(F): 98 (2019 17:07), Max: 98 (2019 20:23)  HR: 72 (2019 17:07) (58 - 89)  BP: 153/43 (2019 17:07) (123/77 - 159/76)  BP(mean): --  RR: 17 (2019 17:07) (16 - 18)  SpO2: 97% (2019 17:07) (92% - 100%)      General: WN/WD NAD  Neurology: A&Ox3, nonfocal, PALM x 4  Respiratory: CTA B/L  CV: RRR, S1S2, no murmurs, rubs or gallops  Abdominal: Soft, NT, ND +BS, Last BM  Extremities: No edema, + peripheral pulses      LABS:                        11.1   14.42 )-----------( 706      ( 2019 05:41 )             38.5     02-    141  |  110<H>  |  37<H>  ----------------------------<  78  4.7   |  22  |  1.65<H>    Ca    8.1<L>      2019 05:41    TPro  7.5  /  Alb  3.6  /  TBili  0.4  /  DBili  x   /  AST  18  /  ALT  18  /  AlkPhos  82  02-05    PT/INR - ( 2019 11:39 )   PT: 11.7 sec;   INR: 1.05 ratio         PTT - ( 2019 11:39 )  PTT:35.8 sec  Urinalysis Basic - ( 2019 09:26 )    Color: Yellow / Appearance: Clear / S.015 / pH: x  Gluc: x / Ketone: Negative  / Bili: Negative / Urobili: Negative mg/dL   Blood: x / Protein: 30 mg/dL / Nitrite: Negative   Leuk Esterase: Negative / RBC: Negative /HPF / WBC 0-2   Sq Epi: x / Non Sq Epi: Occasional / Bacteria: Occasional        RADIOLOGY & ADDITIONAL TESTS: INTERVAL HPI/OVERNIGHT EVENTS:  89 yo male with h/o Myeloproliferative Disorder/Thrombocytosis on Hydroxyurea, HTN, h/o back surgery, L DVT, GI bleeding, OA who presented to the ED for slurred speech. He was eating at a restaurant and he was noted to have slurred speech, 911 was called; by the time he came to the ED his speech was back to normal.  -no focal deficits noted    19- Patient seen and examined at bedside. States he feels well, states his L facial droop and slurred speech have resolved, no complaints at this time.    MEDICATIONS  (STANDING):  amLODIPine   Tablet 10 milliGRAM(s) Oral daily  aspirin enteric coated 81 milliGRAM(s) Oral daily  atorvastatin 40 milliGRAM(s) Oral at bedtime  docusate sodium 100 milliGRAM(s) Oral two times a day  folic acid 1 milliGRAM(s) Oral daily  heparin  Injectable 5000 Unit(s) SubCutaneous every 12 hours  hydroxyurea 500 milliGRAM(s) Oral two times a day  pantoprazole    Tablet 40 milliGRAM(s) Oral before breakfast  polyethylene glycol 3350 17 Gram(s) Oral daily    MEDICATIONS  (PRN):      Allergies    No Known Allergies    Intolerances      ROS:  CONSTITUTIONAL: +weakness, no fevers or chills  EYES/ENT: No visual changes;  No vertigo or throat pain   NECK: No pain or stiffness  RESPIRATORY: No cough, wheezing, hemoptysis; No shortness of breath  CARDIOVASCULAR: No chest pain or palpitations  GASTROINTESTINAL: No abdominal or epigastric pain. No nausea, vomiting, or hematemesis  GENITOURINARY: No dysuria, frequency or hematuria  NEUROLOGICAL: No numbness or weakness  SKIN: No itching, burning, rashes, or lesions   All other review of systems is negative unless indicated above.    Vital Signs Last 24 Hrs  T(C): 36.7 (2019 17:07), Max: 36.7 (2019 20:23)  T(F): 98 (2019 17:07), Max: 98 (2019 20:23)  HR: 72 (2019 17:07) (58 - 89)  BP: 153/43 (2019 17:07) (123/77 - 159/76)  BP(mean): --  RR: 17 (2019 17:07) (16 - 18)  SpO2: 97% (2019 17:07) (92% - 100%)    Physical Exam:  General: WN/WD NAD  Neurology: A&Ox3, nonfocal, PALM x 4  Respiratory: CTA B/L  CV: RRR, S1S2, +murmur  Abdominal: Soft, NT, ND +BS  Extremities: no edema, + peripheral pulses      LABS:                        11.1   14.42 )-----------( 706      ( 2019 05:41 )             38.5     02-06    141  |  110<H>  |  37<H>  ----------------------------<  78  4.7   |  22  |  1.65<H>    Ca    8.1<L>      2019 05:41    TPro  7.5  /  Alb  3.6  /  TBili  0.4  /  DBili  x   /  AST  18  /  ALT  18  /  AlkPhos  82  02-05    PT/INR - ( 2019 11:39 )   PT: 11.7 sec;   INR: 1.05 ratio         PTT - ( 2019 11:39 )  PTT:35.8 sec  Urinalysis Basic - ( 2019 09:26 )    Color: Yellow / Appearance: Clear / S.015 / pH: x  Gluc: x / Ketone: Negative  / Bili: Negative / Urobili: Negative mg/dL   Blood: x / Protein: 30 mg/dL / Nitrite: Negative   Leuk Esterase: Negative / RBC: Negative /HPF / WBC 0-2   Sq Epi: x / Non Sq Epi: Occasional / Bacteria: Occasional        RADIOLOGY & ADDITIONAL TESTS:

## 2019-02-06 NOTE — ED ADULT NURSE REASSESSMENT NOTE - NS ED NURSE REASSESS COMMENT FT1
Patient A&Ox4, resting comfortably in bed. VSS, denies pain/discomfort. Neuro checks unremarkable, no s/s of distress present. Eating breakfast, no signs of dysphagia present. Plan of care discussed, wait time explained. Safety & comfort measures in place, will continue to monitor.
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
## 2019-02-06 NOTE — ED ADULT NURSE REASSESSMENT NOTE - NS ED NURSE REASSESS COMMENT FT1
Patient remains as previously assessed. VSS, resting comfortably in bed. Neuro checks unremarkable, speech clear. No s/s of distress on my time. Hand-off report to RN Miguel Angel, awaiting transport to . Safety & comfort measures in place, purposeful active rounding on my time.

## 2019-02-06 NOTE — PROGRESS NOTE ADULT - SUBJECTIVE AND OBJECTIVE BOX
Interval History:  19: Patient has no new complaints today.     MEDICATIONS  (STANDING):  amLODIPine   Tablet 10 milliGRAM(s) Oral daily  aspirin enteric coated 81 milliGRAM(s) Oral daily  atorvastatin 40 milliGRAM(s) Oral at bedtime  docusate sodium 100 milliGRAM(s) Oral two times a day  folic acid 1 milliGRAM(s) Oral daily  heparin  Injectable 5000 Unit(s) SubCutaneous every 12 hours  hydroxyurea 500 milliGRAM(s) Oral two times a day  pantoprazole    Tablet 40 milliGRAM(s) Oral before breakfast  polyethylene glycol 3350 17 Gram(s) Oral daily  sodium chloride 0.9%. 1000 milliLiter(s) (83 mL/Hr) IV Continuous <Continuous>    MEDICATIONS  (PRN):      Allergies    No Known Allergies    Intolerances        PHYSICAL EXAM:  Vital Signs Last 24 Hrs  T(F): 98 (19 @ 11:45)  HR: 62 (19 @ 11:45)  BP: 154/78 (19 @ 11:45)  RR: 16 (19 @ 11:45)    GENERAL: NAD, well-groomed, well-developed  HEAD:  Atraumatic, Normocephalic  Neuro:  Awake, alert, no aphasia  CN: PERRL, EOMI, no nystagmus, subtle left facial weakness, tongue protrudes in the midline  motor: normal tone, no pronator drift. Mild chronic right leg weakness, otherwise no focal weakness  sensory: intact to light touch  coordination: finger to nose intact bilaterally  DTRs:hypoactive    LABS:                        11.1   14.42 )-----------( 706      ( 2019 05:41 )             38.5     02-    141  |  110<H>  |  37<H>  ----------------------------<  78  4.7   |  22  |  1.65<H>    Ca    8.1<L>      2019 05:41    TPro  7.5  /  Alb  3.6  /  TBili  0.4  /  DBili  x   /  AST  18  /  ALT  18  /  AlkPhos  82  02-05    PT/INR - ( 2019 11:39 )   PT: 11.7 sec;   INR: 1.05 ratio         PTT - ( 2019 11:39 )  PTT:35.8 sec  Urinalysis Basic - ( 2019 09:26 )    Color: Yellow / Appearance: Clear / S.015 / pH: x  Gluc: x / Ketone: Negative  / Bili: Negative / Urobili: Negative mg/dL   Blood: x / Protein: 30 mg/dL / Nitrite: Negative   Leuk Esterase: Negative / RBC: Negative /HPF / WBC 0-2   Sq Epi: x / Non Sq Epi: Occasional / Bacteria: Occasional        RADIOLOGY & ADDITIONAL STUDIES:    CT head 19:  No acute intracranial hemorrhage, mass effect or midline shift. No   interval change since head CT of 18.

## 2019-02-06 NOTE — ED ADULT NURSE REASSESSMENT NOTE - NS ED NURSE REASSESS COMMENT FT1
pt medicated as ordered. neuro checks completed, no deficits noted. vss. pending bed placement. will continue to monitor

## 2019-02-06 NOTE — ED ADULT NURSE REASSESSMENT NOTE - NS ED NURSE REASSESS COMMENT FT1
Patient transferred to hospital bed, hygiene care performed. Repositioned for comfort & safety. Will continue to monitor.

## 2019-02-07 LAB
ANION GAP SERPL CALC-SCNC: 6 MMOL/L — SIGNIFICANT CHANGE UP (ref 5–17)
BUN SERPL-MCNC: 40 MG/DL — HIGH (ref 7–23)
CALCIUM SERPL-MCNC: 8.2 MG/DL — LOW (ref 8.5–10.1)
CHLORIDE SERPL-SCNC: 110 MMOL/L — HIGH (ref 96–108)
CO2 SERPL-SCNC: 25 MMOL/L — SIGNIFICANT CHANGE UP (ref 22–31)
CREAT SERPL-MCNC: 1.5 MG/DL — HIGH (ref 0.5–1.3)
CULTURE RESULTS: SIGNIFICANT CHANGE UP
GLUCOSE SERPL-MCNC: 89 MG/DL — SIGNIFICANT CHANGE UP (ref 70–99)
HCT VFR BLD CALC: 36.6 % — LOW (ref 39–50)
HGB BLD-MCNC: 10.4 G/DL — LOW (ref 13–17)
MCHC RBC-ENTMCNC: 24.1 PG — LOW (ref 27–34)
MCHC RBC-ENTMCNC: 28.4 GM/DL — LOW (ref 32–36)
MCV RBC AUTO: 84.7 FL — SIGNIFICANT CHANGE UP (ref 80–100)
NRBC # BLD: 0 /100 WBCS — SIGNIFICANT CHANGE UP (ref 0–0)
PLATELET # BLD AUTO: 651 K/UL — HIGH (ref 150–400)
POTASSIUM SERPL-MCNC: 4.7 MMOL/L — SIGNIFICANT CHANGE UP (ref 3.5–5.3)
POTASSIUM SERPL-SCNC: 4.7 MMOL/L — SIGNIFICANT CHANGE UP (ref 3.5–5.3)
RBC # BLD: 4.32 M/UL — SIGNIFICANT CHANGE UP (ref 4.2–5.8)
RBC # FLD: 22.3 % — HIGH (ref 10.3–14.5)
SODIUM SERPL-SCNC: 141 MMOL/L — SIGNIFICANT CHANGE UP (ref 135–145)
SPECIMEN SOURCE: SIGNIFICANT CHANGE UP
WBC # BLD: 16.69 K/UL — HIGH (ref 3.8–10.5)
WBC # FLD AUTO: 16.69 K/UL — HIGH (ref 3.8–10.5)

## 2019-02-07 PROCEDURE — 70450 CT HEAD/BRAIN W/O DYE: CPT | Mod: 26

## 2019-02-07 PROCEDURE — 99232 SBSQ HOSP IP/OBS MODERATE 35: CPT

## 2019-02-07 RX ADMIN — POLYETHYLENE GLYCOL 3350 17 GRAM(S): 17 POWDER, FOR SOLUTION ORAL at 11:49

## 2019-02-07 RX ADMIN — Medication 81 MILLIGRAM(S): at 11:46

## 2019-02-07 RX ADMIN — PANTOPRAZOLE SODIUM 40 MILLIGRAM(S): 20 TABLET, DELAYED RELEASE ORAL at 06:41

## 2019-02-07 RX ADMIN — HEPARIN SODIUM 5000 UNIT(S): 5000 INJECTION INTRAVENOUS; SUBCUTANEOUS at 06:41

## 2019-02-07 RX ADMIN — ATORVASTATIN CALCIUM 40 MILLIGRAM(S): 80 TABLET, FILM COATED ORAL at 22:15

## 2019-02-07 RX ADMIN — AMLODIPINE BESYLATE 10 MILLIGRAM(S): 2.5 TABLET ORAL at 06:42

## 2019-02-07 RX ADMIN — Medication 1 MILLIGRAM(S): at 11:46

## 2019-02-07 RX ADMIN — Medication 100 MILLIGRAM(S): at 06:41

## 2019-02-07 RX ADMIN — HYDROXYUREA 500 MILLIGRAM(S): 500 CAPSULE ORAL at 20:11

## 2019-02-07 RX ADMIN — HYDROXYUREA 500 MILLIGRAM(S): 500 CAPSULE ORAL at 06:41

## 2019-02-07 NOTE — PHYSICAL THERAPY INITIAL EVALUATION ADULT - GENERAL OBSERVATIONS, REHAB EVAL
Patient received in bed, +HM.  Observed to be flailing R>L extremities at times, dyskinesia. Speech slow, mildly slurred.

## 2019-02-07 NOTE — PROGRESS NOTE ADULT - SUBJECTIVE AND OBJECTIVE BOX
REASON FOR VISIT: TIA    HPI:  89 yo man with a history of myeloproliferative disorder, HTN, DVT, GI bleeding, OA, mild valvular heart disease admitted 2/5/19 with suspected TIA.    2/7/19:  Confused; no complaints wants to return home.    MEDICATIONS  (STANDING):  amLODIPine   Tablet 10 milliGRAM(s) Oral daily  aspirin enteric coated 81 milliGRAM(s) Oral daily  atorvastatin 40 milliGRAM(s) Oral at bedtime  docusate sodium 100 milliGRAM(s) Oral two times a day  folic acid 1 milliGRAM(s) Oral daily  heparin  Injectable 5000 Unit(s) SubCutaneous every 12 hours  hydroxyurea 500 milliGRAM(s) Oral two times a day  pantoprazole    Tablet 40 milliGRAM(s) Oral before breakfast  polyethylene glycol 3350 17 Gram(s) Oral daily    Vital Signs Last 24 Hrs  T(C): 37.1 (07 Feb 2019 11:38), Max: 37.1 (07 Feb 2019 04:58)  T(F): 98.8 (07 Feb 2019 11:38), Max: 98.8 (07 Feb 2019 04:58)  HR: 67 (07 Feb 2019 11:38) (65 - 72)  BP: 123/59 (07 Feb 2019 11:38) (123/59 - 153/43)  RR: 18 (07 Feb 2019 11:38) (17 - 18)  SpO2: 94% (07 Feb 2019 11:38) (94% - 100%)    PHYSICAL EXAM:  Constitutional: NAD, awake, confused  Neck:  supple,  No JVD  Respiratory: Breath sounds are clear bilaterally, No wheezing, rales or rhonchi  Cardiovascular: S1 and S2, regular rate and rhythm  Gastrointestinal: Bowel Sounds present, soft, nontender.   Extremities: No peripheral edema.    LABS:                   10.4   16.69 )-----------( 651      ( 07 Feb 2019 06:28 )             36.6     141  |  110<H>  |  40<H>  ----------------------------<  89  4.7   |  25  |  1.50<H>    Ca    8.2<L>      07 Feb 2019 06:28    12 Lead ECG (02.05.19 @ 12:38):  Sinus bradycardia.  Right bundle branch block. Left anterior fascicular block. T wave abnormality, consider inferolateral ischemia    Transthoracic Echocardiogram (02.06.19 @ 13:47):   Left ventricle systolic function appears preserved; segmental wall motion abnormalities noted. Hypokinesis of the inferoseptal and basal inferior walls. Estimated Ejection Fraction is 50%.   Mild concentric left ventricular hypertrophy is present.   The left atrium is moderately dilated.   The right atrium appears moderately dilated.   The right ventricle exhibits mild dilation with preserved contractility.   Moderate (2+) mitral regurgitation is present.   EA reversal of the mitral inflow consistent with reduced compliance of the left ventricle.   Moderate aortic stenosis.   Trace to mild aortic regurgitation is present.   Mild pulmonary hypertension.    Tele: Sinus rhythm    CT Head No Cont (02.07.19 @ 12:12):  Moderate periventricular white matter ischemia is noted. Mild atrophy.

## 2019-02-07 NOTE — PHYSICAL THERAPY INITIAL EVALUATION ADULT - MODALITIES TREATMENT COMMENTS
Patient very unsteady.  Patient  left in chair with CBIR and chair alarm active. Patient instructed to call for assistance back to bed or the bathroom, understands same. RN, CM informed of session.

## 2019-02-07 NOTE — PHYSICAL THERAPY INITIAL EVALUATION ADULT - ADDITIONAL COMMENTS
Patient no longer drives, takes taxis and busses. Has a standard walker with a pouch in front in room. Patient wanted to wear his boots "so you people don't say I need rehab if I slip on the floor in these socks".  Donned boots with min A.

## 2019-02-07 NOTE — PHYSICAL THERAPY INITIAL EVALUATION ADULT - FOLLOWS COMMANDS/ANSWERS QUESTIONS, REHAB EVAL
75% of the time/needs frequent re-orientation to task. Tangential./able to follow single-step instructions

## 2019-02-07 NOTE — PHYSICAL THERAPY INITIAL EVALUATION ADULT - PERTINENT HX OF CURRENT PROBLEM, REHAB EVAL
89 yo M admitted for slurred speech. He was eating at a restaurant and he was noted to have slurred speech, 911 was called; by the time he came to the ED his speech was back to normal. CT head (-). MRI: ordered.

## 2019-02-07 NOTE — PHYSICAL THERAPY INITIAL EVALUATION ADULT - LIVES WITH, PROFILE
alone/Pt reports that he is normally very independent and lives alone [place].

## 2019-02-07 NOTE — PROGRESS NOTE ADULT - SUBJECTIVE AND OBJECTIVE BOX
INTERVAL HPI/OVERNIGHT EVENTS:  89 yo male with h/o Myeloproliferative Disorder/Thrombocytosis on Hydroxyurea, HTN, h/o back surgery, L DVT, GI bleeding, OA who presented to the ED for slurred speech. He was eating at a restaurant and he was noted to have slurred speech, 911 was called; by the time he came to the ED his speech was back to normal.  -no focal deficits noted    19- Patient seen and examined at bedside. States he feels well, states his L facial droop and slurred speech have resolved, no complaints at this time.  19- Patient seen and examined at bedside. Patient more agitated today, wants to go home. Explained we have to run more tests regarding recent slurred speech and facial droop.    MEDICATIONS  (STANDING):  amLODIPine   Tablet 10 milliGRAM(s) Oral daily  aspirin enteric coated 81 milliGRAM(s) Oral daily  atorvastatin 40 milliGRAM(s) Oral at bedtime  docusate sodium 100 milliGRAM(s) Oral two times a day  folic acid 1 milliGRAM(s) Oral daily  heparin  Injectable 5000 Unit(s) SubCutaneous every 12 hours  hydroxyurea 500 milliGRAM(s) Oral two times a day  pantoprazole    Tablet 40 milliGRAM(s) Oral before breakfast  polyethylene glycol 3350 17 Gram(s) Oral daily    MEDICATIONS  (PRN):      Allergies    No Known Allergies    Intolerances      ROS:  CONSTITUTIONAL: +weakness, no fevers or chills  EYES/ENT: No visual changes;  No vertigo or throat pain   NECK: No pain or stiffness  RESPIRATORY: No cough, wheezing, hemoptysis; No shortness of breath  CARDIOVASCULAR: No chest pain or palpitations  GASTROINTESTINAL: No abdominal or epigastric pain. No nausea, vomiting, or hematemesis  GENITOURINARY: No dysuria, frequency or hematuria  NEUROLOGICAL: No numbness or weakness  SKIN: No itching, burning, rashes, or lesions   All other review of systems is negative unless indicated above.    Vital Signs Last 24 Hrs  T(C): 37.1 (2019 11:38), Max: 37.1 (2019 04:58)  T(F): 98.8 (2019 11:38), Max: 98.8 (2019 04:58)  HR: 67 (2019 11:38) (65 - 67)  BP: 123/59 (2019 11:38) (123/59 - 137/53)  BP(mean): --  RR: 18 (2019 11:38) (17 - 18)  SpO2: 94% (2019 11:38) (94% - 98%)      Physical Exam:  General: WN/WD NAD  Neurology: A&Ox3, nonfocal, PALM x 4  Respiratory: CTA B/L  CV: RRR, S1S2, +murmur  Abdominal: Soft, NT, ND +BS  Extremities: no edema, + peripheral pulses      LABS:                        10.4   16.69 )-----------( 651      ( 2019 06:28 )             36.6     02-07    141  |  110<H>  |  40<H>  ----------------------------<  89  4.7   |  25  |  1.50<H>    Ca    8.2<L>      2019 06:28        Urinalysis Basic - ( 2019 09:26 )    Color: Yellow / Appearance: Clear / S.015 / pH: x  Gluc: x / Ketone: Negative  / Bili: Negative / Urobili: Negative mg/dL   Blood: x / Protein: 30 mg/dL / Nitrite: Negative   Leuk Esterase: Negative / RBC: Negative /HPF / WBC 0-2   Sq Epi: x / Non Sq Epi: Occasional / Bacteria: Occasional        RADIOLOGY & ADDITIONAL TESTS:

## 2019-02-07 NOTE — PROGRESS NOTE ADULT - SUBJECTIVE AND OBJECTIVE BOX
Interval History:  19: Patient has no new complaints today.   19: Patient has no new complaints but was slightly confused earlier today.       MEDICATIONS  (STANDING):  amLODIPine   Tablet 10 milliGRAM(s) Oral daily  aspirin enteric coated 81 milliGRAM(s) Oral daily  atorvastatin 40 milliGRAM(s) Oral at bedtime  docusate sodium 100 milliGRAM(s) Oral two times a day  folic acid 1 milliGRAM(s) Oral daily  heparin  Injectable 5000 Unit(s) SubCutaneous every 12 hours  hydroxyurea 500 milliGRAM(s) Oral two times a day  pantoprazole    Tablet 40 milliGRAM(s) Oral before breakfast  polyethylene glycol 3350 17 Gram(s) Oral daily    MEDICATIONS  (PRN):      Allergies    No Known Allergies    Intolerances        PHYSICAL EXAM:  Vital Signs Last 24 Hrs  T(F): 98.8 (19 @ 11:38)  HR: 67 (19 @ 11:38)  BP: 123/59 (19 @ 11:38)  RR: 18 (19 @ 11:38)    GENERAL: NAD, well-groomed, well-developed  HEAD:  Atraumatic, Normocephalic  Neuro:  Awake, alert, no aphasia. Oriented to person, place.   CN: PERRL, EOMI, no nystagmus, mild chronic left facial weakness,  tongue protrudes in the midline  motor: mild chronic right upper and lower extremity weakness due to injury/surgeries  sensory: intact to light touch  coordination: finger to nose intact bilaterally      LABS:                        10.4   16.69 )-----------( 651      ( 2019 06:28 )             36.6     -    141  |  110<H>  |  40<H>  ----------------------------<  89  4.7   |  25  |  1.50<H>    Ca    8.2<L>      2019 06:28        Urinalysis Basic - ( 2019 09:26 )    Color: Yellow / Appearance: Clear / S.015 / pH: x  Gluc: x / Ketone: Negative  / Bili: Negative / Urobili: Negative mg/dL   Blood: x / Protein: 30 mg/dL / Nitrite: Negative   Leuk Esterase: Negative / RBC: Negative /HPF / WBC 0-2   Sq Epi: x / Non Sq Epi: Occasional / Bacteria: Occasional        RADIOLOGY & ADDITIONAL STUDIES:      CT head 19:  No acute intracranial hemorrhage, mass effect or midline shift. No   interval change since head CT of 18.    Carotid ultrasound 18:  No significant plaque burden or hemodynamically significant   stenosis.      TTE 19:   Left ventricle systolic function appears preserved; segmental wall motion   abnormalities noted. Hypokinesis of the inferoseptal and basal inferior   walls. Estimated Ejection Fraction is 50%.  Mild concentric left ventricular hypertrophy is present.   The left atrium is moderately dilated.   The right atrium appears moderately dilated.   The right ventricle exhibits mild dilation with preserved contractility.   There is thickening of both mitral valve leaflet tips. The leaflet   opening   is normal. Moderate (2+) mitral regurgitation is present. Mild anterior   mitral annular calcification is present.   EA reversal of the mitral inflow consistent with reduced compliance of   the   leftventricle.   Significant fibrocalcific changes noted to the aortic valve leaflets with   restriction in leaflet excursion. Peak transaortic gradient is 44 mmHg;   this finding is consistent with moderate aortic stenosis.   Trace to mild aortic regurgitation is present.   Normal appearing tricuspid valve structure. Mild to moderate tricuspid   valve regurgitation is present.   Mild pulmonary hypertension.   Pulmonic valve not well seen.   Trace pulmonic valvular regurgitation is present.

## 2019-02-07 NOTE — PHYSICAL THERAPY INITIAL EVALUATION ADULT - GAIT DEVIATIONS NOTED, PT EVAL
places rear legs of walker far out in front and steps into it when front legs hit the ground  "That's how they taught me at [facility]"/decreased roxana

## 2019-02-08 LAB
ANION GAP SERPL CALC-SCNC: 7 MMOL/L — SIGNIFICANT CHANGE UP (ref 5–17)
BUN SERPL-MCNC: 42 MG/DL — HIGH (ref 7–23)
CALCIUM SERPL-MCNC: 8.2 MG/DL — LOW (ref 8.5–10.1)
CHLORIDE SERPL-SCNC: 109 MMOL/L — HIGH (ref 96–108)
CO2 SERPL-SCNC: 24 MMOL/L — SIGNIFICANT CHANGE UP (ref 22–31)
CREAT SERPL-MCNC: 1.53 MG/DL — HIGH (ref 0.5–1.3)
GLUCOSE SERPL-MCNC: 92 MG/DL — SIGNIFICANT CHANGE UP (ref 70–99)
HCT VFR BLD CALC: 39.1 % — SIGNIFICANT CHANGE UP (ref 39–50)
HGB BLD-MCNC: 11.1 G/DL — LOW (ref 13–17)
MCHC RBC-ENTMCNC: 23.8 PG — LOW (ref 27–34)
MCHC RBC-ENTMCNC: 28.4 GM/DL — LOW (ref 32–36)
MCV RBC AUTO: 83.9 FL — SIGNIFICANT CHANGE UP (ref 80–100)
NRBC # BLD: 0 /100 WBCS — SIGNIFICANT CHANGE UP (ref 0–0)
PLATELET # BLD AUTO: 709 K/UL — HIGH (ref 150–400)
POTASSIUM SERPL-MCNC: 4.3 MMOL/L — SIGNIFICANT CHANGE UP (ref 3.5–5.3)
POTASSIUM SERPL-SCNC: 4.3 MMOL/L — SIGNIFICANT CHANGE UP (ref 3.5–5.3)
RBC # BLD: 4.66 M/UL — SIGNIFICANT CHANGE UP (ref 4.2–5.8)
RBC # FLD: 22.3 % — HIGH (ref 10.3–14.5)
SODIUM SERPL-SCNC: 140 MMOL/L — SIGNIFICANT CHANGE UP (ref 135–145)
WBC # BLD: 15.11 K/UL — HIGH (ref 3.8–10.5)
WBC # FLD AUTO: 15.11 K/UL — HIGH (ref 3.8–10.5)

## 2019-02-08 PROCEDURE — 99232 SBSQ HOSP IP/OBS MODERATE 35: CPT

## 2019-02-08 PROCEDURE — 70450 CT HEAD/BRAIN W/O DYE: CPT | Mod: 26

## 2019-02-08 RX ADMIN — HYDROXYUREA 500 MILLIGRAM(S): 500 CAPSULE ORAL at 17:48

## 2019-02-08 RX ADMIN — ATORVASTATIN CALCIUM 40 MILLIGRAM(S): 80 TABLET, FILM COATED ORAL at 21:26

## 2019-02-08 RX ADMIN — Medication 100 MILLIGRAM(S): at 05:38

## 2019-02-08 RX ADMIN — AMLODIPINE BESYLATE 10 MILLIGRAM(S): 2.5 TABLET ORAL at 05:38

## 2019-02-08 RX ADMIN — HYDROXYUREA 500 MILLIGRAM(S): 500 CAPSULE ORAL at 05:40

## 2019-02-08 RX ADMIN — HEPARIN SODIUM 5000 UNIT(S): 5000 INJECTION INTRAVENOUS; SUBCUTANEOUS at 05:39

## 2019-02-08 RX ADMIN — PANTOPRAZOLE SODIUM 40 MILLIGRAM(S): 20 TABLET, DELAYED RELEASE ORAL at 05:38

## 2019-02-08 NOTE — PROGRESS NOTE ADULT - SUBJECTIVE AND OBJECTIVE BOX
INTERVAL HPI/OVERNIGHT EVENTS:  89 yo male with h/o Myeloproliferative Disorder/Thrombocytosis on Hydroxyurea, HTN, h/o back surgery, L DVT, GI bleeding, OA who presented to the ED for slurred speech. He was eating at a restaurant and he was noted to have slurred speech, 911 was called; by the time he came to the ED his speech was back to normal.  -no focal deficits noted    2/6/19- Patient seen and examined at bedside. States he feels well, states his L facial droop and slurred speech have resolved, no complaints at this time.  2/7/19- Patient seen and examined at bedside. Patient more agitated today, wants to go home. Explained we have to run more tests regarding recent slurred speech and facial droop.  2/8/19- Patient seen and examined at bedside. Patient very agitated today, RN called saying patient weaker on R side, not ambulating as well, stat CT head ordered- neg for any acute intracranial findings. Spoke with SW who spoke with niece- stated patient gets very agitated during hospital stays.    MEDICATIONS  (STANDING):  amLODIPine   Tablet 10 milliGRAM(s) Oral daily  aspirin enteric coated 81 milliGRAM(s) Oral daily  atorvastatin 40 milliGRAM(s) Oral at bedtime  docusate sodium 100 milliGRAM(s) Oral two times a day  folic acid 1 milliGRAM(s) Oral daily  heparin  Injectable 5000 Unit(s) SubCutaneous every 12 hours  hydroxyurea 500 milliGRAM(s) Oral two times a day  pantoprazole    Tablet 40 milliGRAM(s) Oral before breakfast  polyethylene glycol 3350 17 Gram(s) Oral daily    MEDICATIONS  (PRN):      Allergies    No Known Allergies    Intolerances      Patient agitated, refusing to answer question, unable to obtain ROS, patient did complain of R knee pain    Vital Signs Last 24 Hrs  T(C): 36.6 (08 Feb 2019 10:41), Max: 37.1 (07 Feb 2019 19:31)  T(F): 97.9 (08 Feb 2019 10:41), Max: 98.8 (07 Feb 2019 19:31)  HR: 70 (08 Feb 2019 10:41) (63 - 75)  BP: 118/73 (08 Feb 2019 10:41) (114/56 - 136/63)  BP(mean): --  RR: 18 (08 Feb 2019 10:41) (18 - 18)  SpO2: 96% (08 Feb 2019 10:41) (96% - 99%)    Physical Exam:  General: WN/WD NAD  Extremities: No edema, + peripheral pulses, R knee- no trauma seen  Patient refused to be examined, very angry he is in hospital      LABS:                        11.1   15.11 )-----------( 709      ( 08 Feb 2019 06:21 )             39.1     02-08    140  |  109<H>  |  42<H>  ----------------------------<  92  4.3   |  24  |  1.53<H>    Ca    8.2<L>      08 Feb 2019 06:21            RADIOLOGY & ADDITIONAL TESTS:

## 2019-02-09 LAB
HCT VFR BLD CALC: 36.4 % — LOW (ref 39–50)
HGB BLD-MCNC: 10.4 G/DL — LOW (ref 13–17)
MCHC RBC-ENTMCNC: 23.9 PG — LOW (ref 27–34)
MCHC RBC-ENTMCNC: 28.6 GM/DL — LOW (ref 32–36)
MCV RBC AUTO: 83.5 FL — SIGNIFICANT CHANGE UP (ref 80–100)
NRBC # BLD: 0 /100 WBCS — SIGNIFICANT CHANGE UP (ref 0–0)
PLATELET # BLD AUTO: 674 K/UL — HIGH (ref 150–400)
RBC # BLD: 4.36 M/UL — SIGNIFICANT CHANGE UP (ref 4.2–5.8)
RBC # FLD: 21.8 % — HIGH (ref 10.3–14.5)
WBC # BLD: 13.8 K/UL — HIGH (ref 3.8–10.5)
WBC # FLD AUTO: 13.8 K/UL — HIGH (ref 3.8–10.5)

## 2019-02-09 PROCEDURE — 73560 X-RAY EXAM OF KNEE 1 OR 2: CPT | Mod: 26,RT

## 2019-02-09 PROCEDURE — 73562 X-RAY EXAM OF KNEE 3: CPT | Mod: 26,RT

## 2019-02-09 RX ADMIN — HYDROXYUREA 500 MILLIGRAM(S): 500 CAPSULE ORAL at 19:19

## 2019-02-09 RX ADMIN — Medication 100 MILLIGRAM(S): at 06:31

## 2019-02-09 RX ADMIN — POLYETHYLENE GLYCOL 3350 17 GRAM(S): 17 POWDER, FOR SOLUTION ORAL at 13:47

## 2019-02-09 RX ADMIN — HEPARIN SODIUM 5000 UNIT(S): 5000 INJECTION INTRAVENOUS; SUBCUTANEOUS at 17:32

## 2019-02-09 RX ADMIN — HYDROXYUREA 500 MILLIGRAM(S): 500 CAPSULE ORAL at 06:32

## 2019-02-09 RX ADMIN — PANTOPRAZOLE SODIUM 40 MILLIGRAM(S): 20 TABLET, DELAYED RELEASE ORAL at 06:31

## 2019-02-09 RX ADMIN — AMLODIPINE BESYLATE 10 MILLIGRAM(S): 2.5 TABLET ORAL at 06:31

## 2019-02-09 RX ADMIN — Medication 1 MILLIGRAM(S): at 13:47

## 2019-02-09 RX ADMIN — ATORVASTATIN CALCIUM 40 MILLIGRAM(S): 80 TABLET, FILM COATED ORAL at 21:44

## 2019-02-09 RX ADMIN — HEPARIN SODIUM 5000 UNIT(S): 5000 INJECTION INTRAVENOUS; SUBCUTANEOUS at 06:31

## 2019-02-09 NOTE — SWALLOW BEDSIDE ASSESSMENT ADULT - SWALLOW EVAL: RECOMMENDED DIET
SUGGEST A REGULAR TEXTURE DIET WITH THIN LIQUID CONSISTENCIES AS THE PATIENT TOLERATES THE SAME FROM AN OROPHARYNGEAL SWALLOWING PERSPECTIVE ON CLINICAL EXAM.

## 2019-02-09 NOTE — SWALLOW BEDSIDE ASSESSMENT ADULT - SWALLOW EVAL: DIAGNOSIS
1)  The patient exhibits Oropharyngeal Swallowing abilities which subjectively appear to be within functional parameters for age. NO behavioral aspiration signs exhibited on exam. Odynophagia was denied.     2) The pt was alert and interactive but somewhat anxious/fidgety at times. He was able to verbalize during communicative probes and in conversation. At these times, his motor speech abilities were felt to be functional, his speech output was intelligible and his utterances were linguistically intact, albeit somewhat tangential at times. He was able to effectively verbalize his needs and is at suspected communicative baseline.

## 2019-02-09 NOTE — PROGRESS NOTE ADULT - ASSESSMENT
# TIA  ASA/Statins  NIHSS=0  Neuro eval noted  MRI brain- patient unable to safely answer questionnaire, repeat CT head today- neg  DVT prophy  dysphagia screen negative  speech formal evaluation  TTE, carotid dopplers    #Myeloproliferative disorder:  Continue Hydrea 500mg  po Bid- goal Plt<500K    #Htn: stable  cw Norvasc    #ARF:  IV hydration: NS
# TIA  ASA/Statins  NIHSS=0  Neuro eval noted  MRI brain- patient unable to safely answer questionnaire, repeat CT head today  DVT prophy  dysphagia screen negative  speech formal evaluation  TTE, carotid dopplers    #Myeloproliferative disorder:  Plt downtrending  Continue Hydrea 500mg  po Bid- goal Plt<500K    #Htn: stable  cw Norvasc    #ARF:  IV hydration: NS   repeat Cr in Am- improving
89 year old man with h/o HTN, Polycythemia, and CKD who presents to the ED on 2/5/19 after an episode of slurred/garbled speech.  His symptoms had resolved at the time of neuro exam and he was not a candidate for tpa.  Presentation consistent with TIA.  Right leg weakness and left facial droop is chronic.  Continue aspirin 81 mg daily  Continue atorvastatin 40 mg qhs.  Awaiting echocardiogram and MRI brain.
89 year old man with h/o HTN, Polycythemia, and CKD who presents to the ED on 2/5/19 after an episode of slurred/garbled speech.  His symptoms had resolved at the time of neuro exam and he was not a candidate for tpa.  Presentation consistent with TIA.  Right leg weakness and left facial droop is chronic.  Continue aspirin 81 mg daily  Continue atorvastatin 40 mg qhs.  He is unable to reliably complete MRI check list so repeat CT head will be done.  Social work to help with safe discharge planning as patient lives alone.
89 year old man with h/o HTN, Polycythemia, and CKD who presents to the ED on 2/5/19 after an episode of slurred/garbled speech.  His symptoms had resolved at the time of neuro exam and he was not a candidate for tpa.  Presentation consistent with TIA.  Right leg weakness and left facial droop is chronic.  Continue aspirin 81 mg daily  Continue atorvastatin 40 mg qhs.  Repeat CT did not show another infarct.  Plan at this time is for transfer to rehab.  Please call back if additional input is needed from neurology service.
# TIA  ASA/Statins  NIHSS=0  Neuro eval noted  MRI brain  DVT prophy  dysphagia screen negative  speech formal evaluation  TTE, carotid dopplers    #Myeloproliferative disorder:  Plt 706K  Increase Hydrea to 500mg  po Bid- goal Plt<500K    #Htn: stable  cw Norvasc    #ARF:  IV hydration: NS   repeat Cr in Am- improving
# TIA  ASA/Statins  NIHSS=0  Neuro eval noted  MRI brain- patient unable to safely answer questionnaire, repeat CT head x2- neg  DVT prophy  dysphagia screen negative  speech formal evaluation  TTE, carotid dopplers    #Myeloproliferative disorder:  Continue Hydrea 500mg  po Bid- goal Plt<500K    #Htn: stable  cw Norvasc    #ARF on CKD3:  IV hydration: NS     #Dispo- likely [facility] on Monday

## 2019-02-09 NOTE — SWALLOW BEDSIDE ASSESSMENT ADULT - SWALLOW EVAL: CRITERIA FOR SKILLED INTERVENTION MET
ACUTE SPEECH PATHOLOGY INTERVENTION WOULD NOT CHANGE CLINICAL MANAGEMENT. HIS OROPHARYNGEAL SWALLOWING ABILITIES AND SPEECH-LANGUAGE ABILITIES WERE WITHIN FUNCTIONAL PARAMETERS. GIVEN ABOVE, WILL NOT ACTIVELY FOLLOW. RECONSULT PRN.

## 2019-02-09 NOTE — SWALLOW BEDSIDE ASSESSMENT ADULT - COMMENTS
The patient was admitted to  with left facial droop and slurred speech which are improving.  Neuro feels that pt is s/p a TIA. This profile is superimposed upon a history of HTN, CKD, Polycythemia, tremors NOS,  past TIA, prior DVT, and previous back surgery. The patient was admitted to  with a possible left facial droop and slurred speech which are improving.  Neuro feels that pt is s/p a TIA. This profile is superimposed upon a history of HTN, CKD, Polycythemia, tremors NOS,  past TIA, prior DVT, and previous back surgery.

## 2019-02-09 NOTE — SWALLOW BEDSIDE ASSESSMENT ADULT - ADDITIONAL RECOMMENDATIONS
1) Nutrition f/u. Note that pt with a history of HTN and CKD.     2) Neuro f/u for TIA symptoms and tremors.

## 2019-02-09 NOTE — SWALLOW BEDSIDE ASSESSMENT ADULT - SLP GENERAL OBSERVATIONS
On encounter, LE tremors were noted at times.   The pt was alert and interactive but somewhat anxious/fidgety at times. He was able to verbalize during communicative probes and in conversation. At these times, his motor speech abilities were felt to be functional, his speech output was intelligible and his utterances were linguistically intact, albeit somewhat tangential at times. He was able to effectively verbalize his needs and is at suspected communicative baseline. Note that the pt denied odynophagia or aspiration signs with meals.

## 2019-02-09 NOTE — PROGRESS NOTE ADULT - SUBJECTIVE AND OBJECTIVE BOX
INTERVAL HPI/OVERNIGHT EVENTS:      MEDICATIONS  (STANDING):  amLODIPine   Tablet 10 milliGRAM(s) Oral daily  aspirin enteric coated 81 milliGRAM(s) Oral daily  atorvastatin 40 milliGRAM(s) Oral at bedtime  docusate sodium 100 milliGRAM(s) Oral two times a day  folic acid 1 milliGRAM(s) Oral daily  heparin  Injectable 5000 Unit(s) SubCutaneous every 12 hours  hydroxyurea 500 milliGRAM(s) Oral two times a day  pantoprazole    Tablet 40 milliGRAM(s) Oral before breakfast  polyethylene glycol 3350 17 Gram(s) Oral daily    MEDICATIONS  (PRN):      Allergies    No Known Allergies    Intolerances        CONSTITUTIONAL: No weakness, fevers or chills  EYES/ENT: No visual changes;  No vertigo or throat pain   NECK: No pain or stiffness  RESPIRATORY: No cough, wheezing, hemoptysis; No shortness of breath  CARDIOVASCULAR: No chest pain or palpitations  GASTROINTESTINAL: No abdominal or epigastric pain. No nausea, vomiting, or hematemesis; No diarrhea or constipation. No melena or hematochezia.  GENITOURINARY: No dysuria, frequency or hematuria  NEUROLOGICAL: No numbness or weakness  SKIN: No itching, burning, rashes, or lesions   All other review of systems is negative unless indicated above.    Vital Signs Last 24 Hrs  T(C): 37 (09 Feb 2019 10:00), Max: 37 (09 Feb 2019 10:00)  T(F): 98.6 (09 Feb 2019 10:00), Max: 98.6 (09 Feb 2019 10:00)  HR: 63 (09 Feb 2019 10:00) (62 - 69)  BP: 125/55 (09 Feb 2019 10:00) (111/50 - 134/67)  BP(mean): --  RR: 17 (09 Feb 2019 10:00) (17 - 18)  SpO2: 98% (09 Feb 2019 10:00) (98% - 98%)      General: WN/WD NAD  Neurology: A&Ox3, nonfocal, PALM x 4  Respiratory: CTA B/L  CV: RRR, S1S2, no murmurs, rubs or gallops  Abdominal: Soft, NT, ND +BS, Last BM  Extremities: No edema, + peripheral pulses      LABS:                        10.4   13.80 )-----------( 674      ( 09 Feb 2019 06:17 )             36.4     02-08    140  |  109<H>  |  42<H>  ----------------------------<  92  4.3   |  24  |  1.53<H>    Ca    8.2<L>      08 Feb 2019 06:21            RADIOLOGY & ADDITIONAL TESTS: INTERVAL HPI/OVERNIGHT EVENTS:  89 yo male with h/o Myeloproliferative Disorder/Thrombocytosis on Hydroxyurea, HTN, h/o back surgery, L DVT, GI bleeding, OA who presented to the ED for slurred speech. He was eating at a restaurant and he was noted to have slurred speech, 911 was called; by the time he came to the ED his speech was back to normal.  -no focal deficits noted    2/6/19- Patient seen and examined at bedside. States he feels well, states his L facial droop and slurred speech have resolved, no complaints at this time.  2/7/19- Patient seen and examined at bedside. Patient more agitated today, wants to go home. Explained we have to run more tests regarding recent slurred speech and facial droop.  2/8/19- Patient seen and examined at bedside. Patient very agitated today, RN called saying patient weaker on R side, not ambulating as well, stat CT head ordered- neg for any acute intracranial findings. Spoke with SW who spoke with niece- stated patient gets very agitated during hospital stays.  2/9/19- Patient seen and examined at bedside. Calmer today, complains of pain in R knee and B/L shoulders, otherwise feels well, no other complaints    MEDICATIONS  (STANDING):  amLODIPine   Tablet 10 milliGRAM(s) Oral daily  aspirin enteric coated 81 milliGRAM(s) Oral daily  atorvastatin 40 milliGRAM(s) Oral at bedtime  docusate sodium 100 milliGRAM(s) Oral two times a day  folic acid 1 milliGRAM(s) Oral daily  heparin  Injectable 5000 Unit(s) SubCutaneous every 12 hours  hydroxyurea 500 milliGRAM(s) Oral two times a day  pantoprazole    Tablet 40 milliGRAM(s) Oral before breakfast  polyethylene glycol 3350 17 Gram(s) Oral daily    MEDICATIONS  (PRN):      Allergies    No Known Allergies    Intolerances      ROS:  CONSTITUTIONAL: No weakness, fevers or chills  EYES/ENT: No visual changes;  No vertigo or throat pain   NECK: No pain or stiffness  RESPIRATORY: No cough, wheezing, hemoptysis; No shortness of breath  CARDIOVASCULAR: No chest pain or palpitations  GASTROINTESTINAL: No abdominal or epigastric pain. No nausea, vomiting, or hematemesis  GENITOURINARY: No dysuria, frequency or hematuria  NEUROLOGICAL: No numbness or weakness  SKIN: No itching, burning, rashes, or lesions   MSK: +knee and B/L shoulder pain  All other review of systems is negative unless indicated above.    Vital Signs Last 24 Hrs  T(C): 37 (09 Feb 2019 10:00), Max: 37 (09 Feb 2019 10:00)  T(F): 98.6 (09 Feb 2019 10:00), Max: 98.6 (09 Feb 2019 10:00)  HR: 63 (09 Feb 2019 10:00) (62 - 69)  BP: 125/55 (09 Feb 2019 10:00) (111/50 - 134/67)  BP(mean): --  RR: 17 (09 Feb 2019 10:00) (17 - 18)  SpO2: 98% (09 Feb 2019 10:00) (98% - 98%)    Physical Exam:  General: WN/WD NAD  Neurology: A&Ox3, nonfocal, PALM x 4  Respiratory: CTA B/L  CV: RRR, S1S2  Abdominal: Soft, NT, ND +BS  Extremities: No edema, + peripheral pulses      LABS:                        10.4   13.80 )-----------( 674      ( 09 Feb 2019 06:17 )             36.4     02-08    140  |  109<H>  |  42<H>  ----------------------------<  92  4.3   |  24  |  1.53<H>    Ca    8.2<L>      08 Feb 2019 06:21            RADIOLOGY & ADDITIONAL TESTS:

## 2019-02-10 RX ADMIN — ATORVASTATIN CALCIUM 40 MILLIGRAM(S): 80 TABLET, FILM COATED ORAL at 21:58

## 2019-02-10 RX ADMIN — PANTOPRAZOLE SODIUM 40 MILLIGRAM(S): 20 TABLET, DELAYED RELEASE ORAL at 06:06

## 2019-02-10 RX ADMIN — AMLODIPINE BESYLATE 10 MILLIGRAM(S): 2.5 TABLET ORAL at 06:06

## 2019-02-10 RX ADMIN — HYDROXYUREA 500 MILLIGRAM(S): 500 CAPSULE ORAL at 06:06

## 2019-02-10 RX ADMIN — HEPARIN SODIUM 5000 UNIT(S): 5000 INJECTION INTRAVENOUS; SUBCUTANEOUS at 18:26

## 2019-02-10 RX ADMIN — Medication 100 MILLIGRAM(S): at 06:06

## 2019-02-10 RX ADMIN — Medication 1 MILLIGRAM(S): at 11:48

## 2019-02-10 RX ADMIN — HEPARIN SODIUM 5000 UNIT(S): 5000 INJECTION INTRAVENOUS; SUBCUTANEOUS at 06:06

## 2019-02-10 RX ADMIN — HYDROXYUREA 500 MILLIGRAM(S): 500 CAPSULE ORAL at 18:23

## 2019-02-10 NOTE — PROGRESS NOTE ADULT - REASON FOR ADMISSION
slurred speech
TIA

## 2019-02-10 NOTE — PROGRESS NOTE ADULT - PROVIDER SPECIALTY LIST ADULT
Cardiology
Hospitalist
Neurology
Hospitalist

## 2019-02-10 NOTE — PROGRESS NOTE ADULT - SUBJECTIVE AND OBJECTIVE BOX
HOSPITAL COURSE AND ASSESSMENT  89 yo male with h/o Myeloproliferative Disorder/Thrombocytosis on Hydroxyurea, HTN, h/o back surgery, L DVT, GI bleeding, OA who presented to the ED for slurred speech. He was eating at a restaurant and he was noted to have slurred speech, 911 was called; by the time he came to the ED his speech was back to normal.  -no focal deficits noted    2/6/19- Patient seen and examined at bedside. States he feels well, states his L facial droop and slurred speech have resolved, no complaints at this time.  2/7/19- Patient seen and examined at bedside. Patient more agitated today, wants to go home. Explained we have to run more tests regarding recent slurred speech and facial droop.  2/8/19- Patient seen and examined at bedside. Patient very agitated today, RN called saying patient weaker on R side, not ambulating as well, stat CT head ordered- neg for any acute intracranial findings. Spoke with SW who spoke with niece- stated patient gets very agitated during hospital stays.  2/9/19- Patient seen and examined at bedside. Calmer today, complains of pain in R knee and B/L shoulders, otherwise feels well, no other complaints  2/10-pt doing well. awaiting facility acceptance.    # TIA  ASA/Statins  NIHSS=0  MRI brain- patient unable to safely answer questionnaire, repeat CT head x2- neg  dysphagia screen negative  speech formal evaluation  TTE, carotid dopplers  Neuro eval noted    #Myeloproliferative disorder:  Continue Hydrea 500mg  po Bid- goal Plt<500K    #Htn: stable  cw Norvasc    #ARF on CKD3:  IV hydration: NS     #Dispo- likely [facility] on Monday          ----------------------------------------------------------------------------------------------  CHIEF COMPLAINT:  tia    SUBJECTIVE:     tolerating PO. OOB. pleasant. denies complaints. mild confusion recalling names of facilities and directions    REVIEW OF SYSTEMS:  All other review of systems is negative unless indicated above    Vital Signs Last 24 Hrs  T(C): 36.7 (10 Feb 2019 10:02), Max: 36.7 (10 Feb 2019 10:02)  T(F): 98.1 (10 Feb 2019 10:02), Max: 98.1 (10 Feb 2019 10:02)  HR: 63 (10 Feb 2019 10:02) (58 - 63)  BP: 123/42 (10 Feb 2019 10:02) (123/42 - 140/60)  BP(mean): --  RR: 18 (10 Feb 2019 10:02) (17 - 18)  SpO2: 98% (10 Feb 2019 10:02) (95% - 98%)  CAPILLARY BLOOD GLUCOSE          PHYSICAL EXAM:  Constitutional: NAD, NCAT  HEENT: EOMI, Normal Hearing, MMM, fair dentition  Neck: trachea midline, No JVD  Respiratory: Breath sounds are clear, unlabored respiration  Cardiovascular: S1 and S2, regular rate   Gastrointestinal: Bowel Sounds present, soft, nontender, nondistended  Extremities: No peripheral edema  Vascular: 2+ radial pulse  Neurological: awake, alert, follows commands, sensation grossly intact  Psych: appropriate affect,  fair insight  Musculoskeletal: moves all extremities  Skin: No rashes    ALLERGIES  No Known Allergies      MEDICATIONS  (STANDING):  amLODIPine   Tablet 10 milliGRAM(s) Oral daily  aspirin enteric coated 81 milliGRAM(s) Oral daily  atorvastatin 40 milliGRAM(s) Oral at bedtime  docusate sodium 100 milliGRAM(s) Oral two times a day  folic acid 1 milliGRAM(s) Oral daily  heparin  Injectable 5000 Unit(s) SubCutaneous every 12 hours  hydroxyurea 500 milliGRAM(s) Oral two times a day  pantoprazole    Tablet 40 milliGRAM(s) Oral before breakfast  polyethylene glycol 3350 17 Gram(s) Oral daily      LABS: All Labs Reviewed:                        10.4   13.80 )-----------( 674      ( 09 Feb 2019 06:17 )             36.4                 Blood Culture: 02-06 @ 09:26  Organism --  Gram Stain Blood -- Gram Stain --  Specimen Source .Urine None  Culture-Blood --

## 2019-02-11 ENCOUNTER — TRANSCRIPTION ENCOUNTER (OUTPATIENT)
Age: 84
End: 2019-02-11

## 2019-02-11 VITALS
DIASTOLIC BLOOD PRESSURE: 64 MMHG | OXYGEN SATURATION: 99 % | TEMPERATURE: 97 F | HEART RATE: 64 BPM | RESPIRATION RATE: 16 BRPM | SYSTOLIC BLOOD PRESSURE: 136 MMHG

## 2019-02-11 RX ORDER — ASPIRIN/CALCIUM CARB/MAGNESIUM 324 MG
1 TABLET ORAL
Qty: 0 | Refills: 0 | COMMUNITY
Start: 2019-02-11

## 2019-02-11 RX ORDER — ATORVASTATIN CALCIUM 80 MG/1
1 TABLET, FILM COATED ORAL
Qty: 0 | Refills: 0 | COMMUNITY
Start: 2019-02-11

## 2019-02-11 RX ADMIN — HEPARIN SODIUM 5000 UNIT(S): 5000 INJECTION INTRAVENOUS; SUBCUTANEOUS at 05:11

## 2019-02-11 RX ADMIN — AMLODIPINE BESYLATE 10 MILLIGRAM(S): 2.5 TABLET ORAL at 05:10

## 2019-02-11 RX ADMIN — Medication 1 MILLIGRAM(S): at 11:02

## 2019-02-11 RX ADMIN — POLYETHYLENE GLYCOL 3350 17 GRAM(S): 17 POWDER, FOR SOLUTION ORAL at 11:02

## 2019-02-11 RX ADMIN — HYDROXYUREA 500 MILLIGRAM(S): 500 CAPSULE ORAL at 05:11

## 2019-02-11 RX ADMIN — Medication 100 MILLIGRAM(S): at 05:10

## 2019-02-11 RX ADMIN — PANTOPRAZOLE SODIUM 40 MILLIGRAM(S): 20 TABLET, DELAYED RELEASE ORAL at 05:11

## 2019-02-11 NOTE — DISCHARGE NOTE ADULT - MEDICATION SUMMARY - MEDICATIONS TO TAKE
I will START or STAY ON the medications listed below when I get home from the hospital:    aspirin 81 mg oral delayed release tablet  -- 1 tab(s) by mouth once a day  -- Indication: For .    atorvastatin 40 mg oral tablet  -- 1 tab(s) by mouth once a day (at bedtime)  -- Indication: For .    hydroxyurea 500 mg oral capsule  -- 1 cap(s) by mouth once a day  -- Indication: For .    amLODIPine 10 mg oral tablet  -- 1 tab(s) by mouth once a day  -- Indication: For .    docusate sodium 100 mg oral capsule  -- 1 cap(s) by mouth 2 times a day  -- Indication: For .    polyethylene glycol 3350 oral powder for reconstitution  -- 17 gram(s) by mouth once a day  -- Indication: For .    pantoprazole 40 mg oral delayed release tablet  -- 1 tab(s) by mouth once a day (before a meal)  -- Indication: For .    folic acid 1 mg oral tablet  -- 1 tab(s) by mouth once a day  -- Indication: For .

## 2019-02-11 NOTE — DISCHARGE NOTE ADULT - PATIENT PORTAL LINK FT
You can access the [facility] Patient Portal, offered by [facility], by registering with the following website: [email or web address]

## 2019-02-11 NOTE — DISCHARGE NOTE ADULT - CARE PLAN
Principal Discharge DX:	TIA (transient ischemic attack)  Goal:	to return to baseline function  Assessment and plan of treatment:	take medications as directed.     rehab MD: follow up in 1-3 days

## 2019-02-11 NOTE — DISCHARGE NOTE ADULT - HOSPITAL COURSE
89 yo male with h/o Myeloproliferative Disorder/Thrombocytosis on Hydroxyurea, HTN, h/o back surgery, L DVT, GI bleeding, OA who presented to the ED for slurred speech. He was eating at a restaurant and he was noted to have slurred speech, 911 was called; by the time he came to the ED his speech was back to normal. No focal deficits noted.    2/6/19-  States he feels well, states his L facial droop and slurred speech have resolved, no complaints at this time.  2/7/19-Patient more agitated today, wants to go home. Explained we have to run more tests regarding recent slurred speech and facial droop.  2/8/19- RN called saying patient weaker on R side, not ambulating as well, stat CT head ordered- neg for any acute intracranial findings. Spoke with SW who spoke with niece- stated patient gets very frustrated during hospital stays.  2/9/19- Calmer today, complains of pain in R knee and B/L shoulders, otherwise feels well, no other complaints  2/10-pt doing well, pleasant. awaiting facility acceptance.  2/11: medically stable for discharge    # TIA  ASA/Statins  NIHSS=0  MRI brain- patient unable to safely answer questionnaire, repeat CT head x2- neg  dysphagia screen negative  speech formal evaluation  TTE, carotid dopplers  Neuro eval noted    #Myeloproliferative disorder:  Continue Hydrea 500mg  po Bid- goal Plt<500K    #Htn: stable  cw Norvasc    #ARF on CKD3:resolved    total time, including coordination of care: 38 minutes, including coordination with case management and patient education  GEN: NAD  EYES: eomi  CV: no edema  RESP: unlabored

## 2019-02-14 DIAGNOSIS — G45.9 TRANSIENT CEREBRAL ISCHEMIC ATTACK, UNSPECIFIED: ICD-10-CM

## 2019-02-14 DIAGNOSIS — I12.9 HYPERTENSIVE CHRONIC KIDNEY DISEASE WITH STAGE 1 THROUGH STAGE 4 CHRONIC KIDNEY DISEASE, OR UNSPECIFIED CHRONIC KIDNEY DISEASE: ICD-10-CM

## 2019-02-14 DIAGNOSIS — N17.9 ACUTE KIDNEY FAILURE, UNSPECIFIED: ICD-10-CM

## 2019-02-14 DIAGNOSIS — N18.3 CHRONIC KIDNEY DISEASE, STAGE 3 (MODERATE): ICD-10-CM

## 2019-02-14 DIAGNOSIS — R29.810 FACIAL WEAKNESS: ICD-10-CM

## 2019-02-14 DIAGNOSIS — R47.81 SLURRED SPEECH: ICD-10-CM

## 2019-02-14 DIAGNOSIS — C94.6 MYELODYSPLASTIC DISEASE, NOT ELSEWHERE CLASSIFIED: ICD-10-CM

## 2019-02-22 ENCOUNTER — APPOINTMENT (OUTPATIENT)
Dept: CARDIOLOGY | Facility: CLINIC | Age: 84
End: 2019-02-22

## 2019-03-11 PROCEDURE — 99285 EMERGENCY DEPT VISIT HI MDM: CPT

## 2019-03-13 ENCOUNTER — INPATIENT (INPATIENT)
Facility: HOSPITAL | Age: 84
LOS: 2 days | Discharge: NURSING, REHAB OR LONG-TERM CARE | End: 2019-03-16
Attending: FAMILY MEDICINE | Admitting: FAMILY MEDICINE
Payer: MEDICARE

## 2019-03-13 VITALS
HEART RATE: 65 BPM | WEIGHT: 139.99 LBS | RESPIRATION RATE: 18 BRPM | SYSTOLIC BLOOD PRESSURE: 143 MMHG | DIASTOLIC BLOOD PRESSURE: 74 MMHG | OXYGEN SATURATION: 98 % | TEMPERATURE: 95 F

## 2019-03-13 DIAGNOSIS — Z98.890 OTHER SPECIFIED POSTPROCEDURAL STATES: Chronic | ICD-10-CM

## 2019-03-13 LAB
ALBUMIN SERPL ELPH-MCNC: 2.6 G/DL — LOW (ref 3.3–5)
ALP SERPL-CCNC: 82 U/L — SIGNIFICANT CHANGE UP (ref 40–120)
ALT FLD-CCNC: 14 U/L — SIGNIFICANT CHANGE UP (ref 12–78)
ANION GAP SERPL CALC-SCNC: 9 MMOL/L — SIGNIFICANT CHANGE UP (ref 5–17)
APPEARANCE UR: CLEAR — SIGNIFICANT CHANGE UP
APTT BLD: 34.5 SEC — SIGNIFICANT CHANGE UP (ref 27.5–36.3)
AST SERPL-CCNC: 32 U/L — SIGNIFICANT CHANGE UP (ref 15–37)
BACTERIA # UR AUTO: ABNORMAL
BILIRUB SERPL-MCNC: 0.3 MG/DL — SIGNIFICANT CHANGE UP (ref 0.2–1.2)
BILIRUB UR-MCNC: NEGATIVE — SIGNIFICANT CHANGE UP
BLD GP AB SCN SERPL QL: SIGNIFICANT CHANGE UP
BUN SERPL-MCNC: 34 MG/DL — HIGH (ref 7–23)
CALCIUM SERPL-MCNC: 7.5 MG/DL — LOW (ref 8.5–10.1)
CHLORIDE SERPL-SCNC: 109 MMOL/L — HIGH (ref 96–108)
CK SERPL-CCNC: 417 U/L — HIGH (ref 26–308)
CO2 SERPL-SCNC: 20 MMOL/L — LOW (ref 22–31)
COLOR SPEC: YELLOW — SIGNIFICANT CHANGE UP
CREAT SERPL-MCNC: 1.61 MG/DL — HIGH (ref 0.5–1.3)
DIFF PNL FLD: ABNORMAL
EPI CELLS # UR: SIGNIFICANT CHANGE UP
GLUCOSE SERPL-MCNC: 75 MG/DL — SIGNIFICANT CHANGE UP (ref 70–99)
GLUCOSE UR QL: NEGATIVE MG/DL — SIGNIFICANT CHANGE UP
HCT VFR BLD CALC: 35.9 % — LOW (ref 39–50)
HGB BLD-MCNC: 10.4 G/DL — LOW (ref 13–17)
HYALINE CASTS # UR AUTO: ABNORMAL /LPF
INR BLD: 1.14 RATIO — SIGNIFICANT CHANGE UP (ref 0.88–1.16)
KETONES UR-MCNC: NEGATIVE — SIGNIFICANT CHANGE UP
LACTATE SERPL-SCNC: 1.3 MMOL/L — SIGNIFICANT CHANGE UP (ref 0.7–2)
LEUKOCYTE ESTERASE UR-ACNC: NEGATIVE — SIGNIFICANT CHANGE UP
LIDOCAIN IGE QN: 153 U/L — SIGNIFICANT CHANGE UP (ref 73–393)
MCHC RBC-ENTMCNC: 24.4 PG — LOW (ref 27–34)
MCHC RBC-ENTMCNC: 29 GM/DL — LOW (ref 32–36)
MCV RBC AUTO: 84.3 FL — SIGNIFICANT CHANGE UP (ref 80–100)
NITRITE UR-MCNC: NEGATIVE — SIGNIFICANT CHANGE UP
NRBC # BLD: 0 /100 WBCS — SIGNIFICANT CHANGE UP (ref 0–0)
PH UR: 6 — SIGNIFICANT CHANGE UP (ref 5–8)
PLATELET # BLD AUTO: 1172 K/UL — CRITICAL HIGH (ref 150–400)
POTASSIUM SERPL-MCNC: 4.6 MMOL/L — SIGNIFICANT CHANGE UP (ref 3.5–5.3)
POTASSIUM SERPL-SCNC: 4.6 MMOL/L — SIGNIFICANT CHANGE UP (ref 3.5–5.3)
PROT SERPL-MCNC: 5.3 GM/DL — LOW (ref 6–8.3)
PROT UR-MCNC: 30 MG/DL
PROTHROM AB SERPL-ACNC: 12.7 SEC — SIGNIFICANT CHANGE UP (ref 10–12.9)
RAPID RVP RESULT: SIGNIFICANT CHANGE UP
RBC # BLD: 4.26 M/UL — SIGNIFICANT CHANGE UP (ref 4.2–5.8)
RBC # FLD: 22.2 % — HIGH (ref 10.3–14.5)
RBC CASTS # UR COMP ASSIST: SIGNIFICANT CHANGE UP /HPF (ref 0–4)
SODIUM SERPL-SCNC: 138 MMOL/L — SIGNIFICANT CHANGE UP (ref 135–145)
SP GR SPEC: 1.01 — SIGNIFICANT CHANGE UP (ref 1.01–1.02)
TROPONIN I SERPL-MCNC: 0.2 NG/ML — HIGH (ref 0.01–0.04)
TROPONIN I SERPL-MCNC: 0.25 NG/ML — HIGH (ref 0.01–0.04)
TYPE + AB SCN PNL BLD: SIGNIFICANT CHANGE UP
UROBILINOGEN FLD QL: NEGATIVE MG/DL — SIGNIFICANT CHANGE UP
WBC # BLD: 25.69 K/UL — HIGH (ref 3.8–10.5)
WBC # FLD AUTO: 25.69 K/UL — HIGH (ref 3.8–10.5)
WBC UR QL: SIGNIFICANT CHANGE UP

## 2019-03-13 PROCEDURE — 93010 ELECTROCARDIOGRAM REPORT: CPT

## 2019-03-13 PROCEDURE — 70450 CT HEAD/BRAIN W/O DYE: CPT | Mod: 26

## 2019-03-13 PROCEDURE — 99232 SBSQ HOSP IP/OBS MODERATE 35: CPT

## 2019-03-13 PROCEDURE — 72125 CT NECK SPINE W/O DYE: CPT | Mod: 26

## 2019-03-13 PROCEDURE — 73030 X-RAY EXAM OF SHOULDER: CPT | Mod: 26,76,RT,77

## 2019-03-13 PROCEDURE — 73060 X-RAY EXAM OF HUMERUS: CPT | Mod: 26,RT

## 2019-03-13 PROCEDURE — 74177 CT ABD & PELVIS W/CONTRAST: CPT | Mod: 26

## 2019-03-13 PROCEDURE — 73030 X-RAY EXAM OF SHOULDER: CPT | Mod: 26,RT

## 2019-03-13 PROCEDURE — 71045 X-RAY EXAM CHEST 1 VIEW: CPT | Mod: 26

## 2019-03-13 PROCEDURE — 71260 CT THORAX DX C+: CPT | Mod: 26

## 2019-03-13 PROCEDURE — 72170 X-RAY EXAM OF PELVIS: CPT | Mod: 26

## 2019-03-13 RX ORDER — SODIUM CHLORIDE 9 MG/ML
1000 INJECTION, SOLUTION INTRAVENOUS
Qty: 0 | Refills: 0 | Status: DISCONTINUED | OUTPATIENT
Start: 2019-03-13 | End: 2019-03-15

## 2019-03-13 RX ORDER — ONDANSETRON 8 MG/1
4 TABLET, FILM COATED ORAL EVERY 6 HOURS
Qty: 0 | Refills: 0 | Status: DISCONTINUED | OUTPATIENT
Start: 2019-03-13 | End: 2019-03-16

## 2019-03-13 RX ORDER — ACETAMINOPHEN 500 MG
975 TABLET ORAL ONCE
Qty: 0 | Refills: 0 | Status: COMPLETED | OUTPATIENT
Start: 2019-03-13 | End: 2019-03-13

## 2019-03-13 RX ORDER — HYDROXYUREA 500 MG/1
1000 CAPSULE ORAL DAILY
Qty: 0 | Refills: 0 | Status: DISCONTINUED | OUTPATIENT
Start: 2019-03-14 | End: 2019-03-16

## 2019-03-13 RX ORDER — PANTOPRAZOLE SODIUM 20 MG/1
40 TABLET, DELAYED RELEASE ORAL
Qty: 0 | Refills: 0 | Status: DISCONTINUED | OUTPATIENT
Start: 2019-03-13 | End: 2019-03-16

## 2019-03-13 RX ORDER — ATORVASTATIN CALCIUM 80 MG/1
40 TABLET, FILM COATED ORAL AT BEDTIME
Qty: 0 | Refills: 0 | Status: DISCONTINUED | OUTPATIENT
Start: 2019-03-13 | End: 2019-03-16

## 2019-03-13 RX ORDER — DOCUSATE SODIUM 100 MG
100 CAPSULE ORAL
Qty: 0 | Refills: 0 | Status: DISCONTINUED | OUTPATIENT
Start: 2019-03-13 | End: 2019-03-14

## 2019-03-13 RX ORDER — PIPERACILLIN AND TAZOBACTAM 4; .5 G/20ML; G/20ML
3.38 INJECTION, POWDER, LYOPHILIZED, FOR SOLUTION INTRAVENOUS ONCE
Qty: 0 | Refills: 0 | Status: COMPLETED | OUTPATIENT
Start: 2019-03-13 | End: 2019-03-13

## 2019-03-13 RX ORDER — FOLIC ACID 0.8 MG
1 TABLET ORAL DAILY
Qty: 0 | Refills: 0 | Status: DISCONTINUED | OUTPATIENT
Start: 2019-03-13 | End: 2019-03-16

## 2019-03-13 RX ORDER — ASPIRIN/CALCIUM CARB/MAGNESIUM 324 MG
81 TABLET ORAL DAILY
Qty: 0 | Refills: 0 | Status: DISCONTINUED | OUTPATIENT
Start: 2019-03-13 | End: 2019-03-16

## 2019-03-13 RX ORDER — ACETAMINOPHEN 500 MG
650 TABLET ORAL EVERY 6 HOURS
Qty: 0 | Refills: 0 | Status: DISCONTINUED | OUTPATIENT
Start: 2019-03-13 | End: 2019-03-16

## 2019-03-13 RX ORDER — AMLODIPINE BESYLATE 2.5 MG/1
10 TABLET ORAL DAILY
Qty: 0 | Refills: 0 | Status: DISCONTINUED | OUTPATIENT
Start: 2019-03-13 | End: 2019-03-16

## 2019-03-13 RX ORDER — VANCOMYCIN HCL 1 G
1000 VIAL (EA) INTRAVENOUS ONCE
Qty: 0 | Refills: 0 | Status: COMPLETED | OUTPATIENT
Start: 2019-03-13 | End: 2019-03-13

## 2019-03-13 RX ORDER — POLYETHYLENE GLYCOL 3350 17 G/17G
17 POWDER, FOR SOLUTION ORAL DAILY
Qty: 0 | Refills: 0 | Status: DISCONTINUED | OUTPATIENT
Start: 2019-03-13 | End: 2019-03-14

## 2019-03-13 RX ORDER — SODIUM CHLORIDE 9 MG/ML
1950 INJECTION INTRAMUSCULAR; INTRAVENOUS; SUBCUTANEOUS ONCE
Qty: 0 | Refills: 0 | Status: COMPLETED | OUTPATIENT
Start: 2019-03-13 | End: 2019-03-13

## 2019-03-13 RX ORDER — SODIUM CHLORIDE 9 MG/ML
1000 INJECTION INTRAMUSCULAR; INTRAVENOUS; SUBCUTANEOUS ONCE
Qty: 0 | Refills: 0 | Status: COMPLETED | OUTPATIENT
Start: 2019-03-13 | End: 2019-03-13

## 2019-03-13 RX ADMIN — PIPERACILLIN AND TAZOBACTAM 3.38 GRAM(S): 4; .5 INJECTION, POWDER, LYOPHILIZED, FOR SOLUTION INTRAVENOUS at 11:31

## 2019-03-13 RX ADMIN — PIPERACILLIN AND TAZOBACTAM 200 GRAM(S): 4; .5 INJECTION, POWDER, LYOPHILIZED, FOR SOLUTION INTRAVENOUS at 10:59

## 2019-03-13 RX ADMIN — SODIUM CHLORIDE 75 MILLILITER(S): 9 INJECTION, SOLUTION INTRAVENOUS at 22:02

## 2019-03-13 RX ADMIN — Medication 975 MILLIGRAM(S): at 17:10

## 2019-03-13 RX ADMIN — Medication 1000 MILLIGRAM(S): at 11:59

## 2019-03-13 RX ADMIN — SODIUM CHLORIDE 1000 MILLILITER(S): 9 INJECTION INTRAMUSCULAR; INTRAVENOUS; SUBCUTANEOUS at 09:00

## 2019-03-13 RX ADMIN — ATORVASTATIN CALCIUM 40 MILLIGRAM(S): 80 TABLET, FILM COATED ORAL at 22:01

## 2019-03-13 RX ADMIN — SODIUM CHLORIDE 1950 MILLILITER(S): 9 INJECTION INTRAMUSCULAR; INTRAVENOUS; SUBCUTANEOUS at 09:46

## 2019-03-13 RX ADMIN — Medication 975 MILLIGRAM(S): at 16:13

## 2019-03-13 RX ADMIN — Medication 250 MILLIGRAM(S): at 10:58

## 2019-03-13 RX ADMIN — SODIUM CHLORIDE 1000 MILLILITER(S): 9 INJECTION INTRAMUSCULAR; INTRAVENOUS; SUBCUTANEOUS at 10:01

## 2019-03-13 RX ADMIN — SODIUM CHLORIDE 1950 MILLILITER(S): 9 INJECTION INTRAMUSCULAR; INTRAVENOUS; SUBCUTANEOUS at 10:47

## 2019-03-13 NOTE — ED PROVIDER NOTE - CARE PLAN
Principal Discharge DX:	NSTEMI (non-ST elevated myocardial infarction)  Secondary Diagnosis:	Shoulder dislocation

## 2019-03-13 NOTE — ED ADULT NURSE NOTE - CHIEF COMPLAINT QUOTE
pt presents to ED due to fall pt was found on the floor this AM - LOC denies hitting head pt was unable to get up until this AM, pt without any complaints. Patient [place] alone [place] [address]. Patient has been on the floor since 9 pm last night and was able to get help this morning from by screaming.

## 2019-03-13 NOTE — H&P ADULT - NSICDXPASTMEDICALHX_GEN_ALL_CORE_FT
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

## 2019-03-13 NOTE — H&P ADULT - ASSESSMENT
#S/p mechanical fall with RT shoulder dislocation s/p reduction  #Prior b/l TSR  Ortho eval appreciated  Keep RLE in sling  NWB to RLE  PT eval- possible [facility]    #Borderline troponins likely 2 to rhabdo vs demand ischemia  #CAD h/o stent  Clinically asymptomatic  EKG changes discussed between ED MD and DR Castro- similar to prior old EKG  Cont Aspirin, statin  Cardio eval DR Hermosillo  Keep on IVF overnight  CPK in am    #Myeloproliferative disorder  Platelets 1172, previously around 200 in 12/2018  Hem eval Dr Lau- will increase hydroxyurea to 1000mg daily    #HTN/hyperlipidemia- stable    #Dispo- inpatient admit. Likely [facility] on Saturday

## 2019-03-13 NOTE — CONSULT NOTE ADULT - SUBJECTIVE AND OBJECTIVE BOX
HPI:  89yo/M with PMH myeloproliferative disorder on Hydroxyurea, CAD s/p stent, HTN, hyperlipidemia, prior b/l TSR presented s/p mechanical fall at home. Patient tripped and fell and could not get up, was laying on the floor for few hours. In ED found to have RT shoulder dislocation and EKG changes. RT shoulder was reduced by ortho. EKG changes are not new, patient is asymptomatic and denies any chest pain. (13 Mar 2019 17:38)    Hematology consult requested for MPD/ Hydrea.    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 Most recently supposed to take Hydrea 1000 mg daily - but patient states - he was taking one 500 mg table and sometimes missed doses.He lives alone and has no family support here.    Also has hx of Rt DVT ( Jan 2018)- not on Ac due to recurrent GI bleeding, s/p IVC filter    PAST MEDICAL & SURGICAL HISTORY:  Myeloproliferative disease  Chronic deep vein thrombosis (DVT) of right iliac vein  Polycythemia  Tremor  HTN (hypertension)  History of back surgery      Social  Hx: , no children  Lives alone. No tobacco No ETOH  ROS : as above     Vital Signs Last 24 Hrs  T(C): 36.4 (13 Mar 2019 17:54), Max: 36.4 (13 Mar 2019 17:54)  T(F): 97.6 (13 Mar 2019 17:54), Max: 97.6 (13 Mar 2019 17:54)  HR: 60 (13 Mar 2019 17:54) (60 - 79)  BP: 100/60 (13 Mar 2019 17:54) (100/60 - 145/86)  BP(mean): --  RR: 16 (13 Mar 2019 17:54) (13 - 18)  SpO2: 98% (13 Mar 2019 17:54) (98% - 100%)    Elderly male lying in bed NAD Oral mucosa dry.  Rt shoulder in sling.   Lungs clear Heart RRR Abdomen soft, no overt splenomegaly.   Neuro non focal                 Complete Blood Count (03.13.19 @ 09:14)    Nucleated RBC: 0 /100 WBCs    WBC Count: 25.69 K/uL    RBC Count: 4.26 M/uL    Hemoglobin: 10.4 g/dL    Hematocrit: 35.9 %    Mean Cell Volume: 84.3 fl    Mean Cell Hemoglobin: 24.4 pg    Mean Cell Hemoglobin Conc: 29.0 gm/dL    Red Cell Distrib Width: 22.2 %    Platelet Count - Automated: 1172: Test Name:plt  Called by:hortencia  Called to:tereza gutierrez rn  Read back 2 Pt IDs:y Read back values:y Date/Tm:03/13/19 09:45 K/uL    03-13    138  |  109<H>  |  34<H>  ----------------------------<  75  4.6   |  20<L>  |  1.61<H>    Ca    7.5<L>      13 Mar 2019 09:14    TPro  5.3<L>  /  Alb  2.6<L>  /  TBili  0.3  /  DBili  x   /  AST  32  /  ALT  14  /  AlkPhos  82  03-13    MEDICATIONS  (STANDING):  amLODIPine   Tablet 10 milliGRAM(s) Oral daily  aspirin enteric coated 81 milliGRAM(s) Oral daily  atorvastatin 40 milliGRAM(s) Oral at bedtime  docusate sodium 100 milliGRAM(s) Oral two times a day  folic acid 1 milliGRAM(s) Oral daily  lactated ringers. 1000 milliLiter(s) (75 mL/Hr) IV Continuous <Continuous>  pantoprazole    Tablet 40 milliGRAM(s) Oral before breakfast  polyethylene glycol 3350 17 Gram(s) Oral daily    MEDICATIONS  (PRN):  acetaminophen   Tablet .. 650 milliGRAM(s) Oral every 6 hours PRN Temp greater or equal to 38C (100.4F), Mild Pain (1 - 3)  aluminum hydroxide/magnesium hydroxide/simethicone Suspension 30 milliLiter(s) Oral every 4 hours PRN Dyspepsia  ondansetron Injectable 4 milliGRAM(s) IV Push every 6 hours PRN Nausea

## 2019-03-13 NOTE — ED ADULT NURSE NOTE - NSIMPLEMENTINTERV_GEN_ALL_ED
Implemented All Fall with Harm Risk Interventions:  [place] to call system. Call bell, personal items and telephone within reach. Instruct patient to call for assistance. Room bathroom lighting operational. Non-slip footwear when patient is off stretcher. Physically safe environment: no spills, clutter or unnecessary equipment. Stretcher in lowest position, wheels locked, appropriate side rails in place. Provide visual cue, wrist band, yellow gown, etc. Monitor gait and stability. Monitor for mental status changes and reorient to person, place, and time. Review medications for side effects contributing to fall risk. Reinforce activity limits and safety measures with patient and family. Provide visual clues: red socks.

## 2019-03-13 NOTE — CONSULT NOTE ADULT - ASSESSMENT
A/P:  89 yo M s/p MF w/ R reverse total shoulder replacement dislocation:  -reduction performed with adequate relocation/alignment  -NWB RUE in shoulder immobilizer  -pain control  -Ice  -dvt ppx  -medical management  -no acute orthopedic surgical intervention indicated at this time  -ortho stable for DC  -dispo planning

## 2019-03-13 NOTE — CONSULT NOTE ADULT - ASSESSMENT
89 y/o male with long history of MPD on Hydrea admitted s/p Fall shoulder dislocation.  Elevated plts/ WBC due to MPD . Component of reactive leukocytosis/ stress/ dehydration .  Noncompliance   with Hydrea.    Plan : Hydrea 1000 mg daily . Continue ASA 81 mg. Monitor counts.  Order for Hydrea written on chemotherapy order form and spoke with ER pharmacist.    Will benefit from VNS to monitor at home/ supervise meds.  Thank You.

## 2019-03-13 NOTE — ED ADULT NURSE REASSESSMENT NOTE - NS ED NURSE REASSESS COMMENT FT1
Received in lauro, magalien on. Alert & responsive. VSS.  Awaiting admission. No distress noted. Quietly resting. Will continue to monitor

## 2019-03-13 NOTE — H&P ADULT - NSHPLABSRESULTS_GEN_ALL_CORE
10.4   25.69 )-----------( 1172     ( 13 Mar 2019 09:14 )             35.9     13 Mar 2019 09:14    138    |  109    |  34     ----------------------------<  75     4.6     |  20     |  1.61     Ca    7.5        13 Mar 2019 09:14    TPro  5.3    /  Alb  2.6    /  TBili  0.3    /  DBili  x      /  AST  32     /  ALT  14     /  AlkPhos  82     13 Mar 2019 09:14    LIVER FUNCTIONS - ( 13 Mar 2019 09:14 )  Alb: 2.6 g/dL / Pro: 5.3 gm/dL / ALK PHOS: 82 U/L / ALT: 14 U/L / AST: 32 U/L / GGT: x           PT/INR - ( 13 Mar 2019 09:14 )   PT: 12.7 sec;   INR: 1.14 ratio       PTT - ( 13 Mar 2019 09:14 )  PTT:34.5 sec    CARDIAC MARKERS ( 13 Mar 2019 12:48 )  0.246 ng/mL / x     / x     / x     / x      CARDIAC MARKERS ( 13 Mar 2019 09:14 )  0.205 ng/mL / x     / 417 U/L / x     / x        Urinalysis Basic - ( 13 Mar 2019 12:08 )  Color: Yellow / Appearance: Clear / S.010 / pH: x  Gluc: x / Ketone: Negative  / Bili: Negative / Urobili: Negative mg/dL   Blood: x / Protein: 30 mg/dL / Nitrite: Negative   Leuk Esterase: Negative / RBC: 0-2 /HPF / WBC 0-2   Sq Epi: x / Non Sq Epi: Occasional / Bacteria: Occasional

## 2019-03-13 NOTE — ED PROVIDER NOTE - PROGRESS NOTE DETAILS
JW PCI consultation placed with Dr. Castro. Agrees EKG unchanged from prior.  Given age and risk factors Pt not a candidate for PCI.  PT denies chest pain and SOB at this time.  EKG unchanged, no acute STEMI despite EKG machine read.  Will proceed with serial EKG and Trop. JW Shoulder prosthesis dislocated now S/P reduction.  No other signs of traumatic injury.  NSTEMI today with syncopal episode. JW Shoulder prosthesis dislocated now S/P reduction.  No other signs of traumatic injury.  NSTEMI. Given presentation and findings, patient requires hospitalization.  I discussed all findings from our evaluation today with the patient and the medical necessity for admission to the hospital.  I addressed expectations regarding the admission and I discussed the plan of care in detail.  I addressed all questions and concerns regarding the admission and the plan of care.  I used verbal teach back to confirm understanding.  The patient agreed with the admission and the plan of care.  Pt signed out to Dr. Barakat.  Patient's history, vital signs, lab results, imaging, pertinent findings, and clinical condition reviewed during sign out.  At sign out patient admitted in hemodynamically stable condition in no acute distress.

## 2019-03-13 NOTE — CONSULT NOTE ADULT - SUBJECTIVE AND OBJECTIVE BOX
HPI:  89 yo M w/ PMHx of chronic DVT R iliac vein, HTN, polycythemia, R reverse total shoulder replacement ([facility]), and L total shoulder replacement (MARKUS Sheikh), who presents to the ED after sustaining a mechanical fall with lower back pain and right shoulder pain. Pt states she fell onto the floor as he was trying to turn lights on at home. Pt states he spent the night on the floor before he could call EMS.     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  Polycythemia  Tremor  HTN (hypertension)  History of back surgery    Home Medications:  amLODIPine 10 mg oral tablet: 1 tab(s) orally once a day (05 Feb 2019 14:59)  aspirin 81 mg oral delayed release tablet: 1 tab(s) orally once a day (11 Feb 2019 14:41)  atorvastatin 40 mg oral tablet: 1 tab(s) orally once a day (at bedtime) (11 Feb 2019 14:41)  docusate sodium 100 mg oral capsule: 1 cap(s) orally 2 times a day (05 Feb 2019 14:59)  folic acid 1 mg oral tablet: 1 tab(s) orally once a day (05 Feb 2019 14:59)  hydroxyurea 500 mg oral capsule: 1 cap(s) orally once a day (05 Feb 2019 14:59)  pantoprazole 40 mg oral delayed release tablet: 1 tab(s) orally once a day (before a meal) (05 Feb 2019 14:37)  polyethylene glycol 3350 oral powder for reconstitution: 17 gram(s) orally once a day (05 Feb 2019 14:37)    Allergies    No Known Allergies    Intolerances    Vital Signs Last 24 Hrs  T(C): 36.1 (13 Mar 2019 12:03), Max: 36.1 (13 Mar 2019 12:03)  T(F): 97 (13 Mar 2019 12:03), Max: 97 (13 Mar 2019 12:03)  HR: 65 (13 Mar 2019 12:03) (64 - 65)  BP: 121/56 (13 Mar 2019 12:03) (121/56 - 145/86)  BP(mean): --  RR: 14 (13 Mar 2019 12:03) (14 - 18)  SpO2: 100% (13 Mar 2019 12:03) (98% - 100%)    PE:  Gen: NAD, resting in bed  RUE:  Skin intact, no erythema, effusion  Severe ecchymoses located anterolateral on the arm  Deformity noted in shoulder  Pain with A/PROM of R shoulder  +AIN/PIN/M/U/R/Ax  SILT C5-T2  2+ RP    Secondary Survey:  No head trauma  +ttp along mid thoracic paraspinal muscles  No ttp along c/s spine  Mild   No ttp along bony prominences diffusely, other than mentioned above  +A/PROM intact in all joints diffusely, other than mentioned above  SILT/NVI diffusely  Compartments soft and compressible diffusely    XR R Shoulder: dislocation of reverse total shoulder replacement on Right    Procedure:  Imaging reviewed prior to reduction  Pt NVI pre procedure  Reduction performed  Pt NVI post procedure  Post reduction Xrays performed demonstrating relocation   Pt placed in shoulder immobilizer HPI:  89 yo M w/ PMHx of chronic DVT R iliac vein, HTN, polycythemia, R reverse total shoulder replacement ([facility]), and L total shoulder replacement (MARKUS Sheikh), who presents to the ED after sustaining a mechanical fall with lower back pain and right shoulder pain. Pt states she fell onto the floor as he was trying to turn lights on at home. Pt states he spent the night on the floor before he could call EMS. Pt has not had any other prior ortho surgeries. Does not take any A/C. Walks with a walker at baseline.     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  Polycythemia  Tremor  HTN (hypertension)  History of back surgery    Home Medications:  amLODIPine 10 mg oral tablet: 1 tab(s) orally once a day (05 Feb 2019 14:59)  aspirin 81 mg oral delayed release tablet: 1 tab(s) orally once a day (11 Feb 2019 14:41)  atorvastatin 40 mg oral tablet: 1 tab(s) orally once a day (at bedtime) (11 Feb 2019 14:41)  docusate sodium 100 mg oral capsule: 1 cap(s) orally 2 times a day (05 Feb 2019 14:59)  folic acid 1 mg oral tablet: 1 tab(s) orally once a day (05 Feb 2019 14:59)  hydroxyurea 500 mg oral capsule: 1 cap(s) orally once a day (05 Feb 2019 14:59)  pantoprazole 40 mg oral delayed release tablet: 1 tab(s) orally once a day (before a meal) (05 Feb 2019 14:37)  polyethylene glycol 3350 oral powder for reconstitution: 17 gram(s) orally once a day (05 Feb 2019 14:37)    Allergies    No Known Allergies    Intolerances    Vital Signs Last 24 Hrs  T(C): 36.1 (13 Mar 2019 12:03), Max: 36.1 (13 Mar 2019 12:03)  T(F): 97 (13 Mar 2019 12:03), Max: 97 (13 Mar 2019 12:03)  HR: 65 (13 Mar 2019 12:03) (64 - 65)  BP: 121/56 (13 Mar 2019 12:03) (121/56 - 145/86)  BP(mean): --  RR: 14 (13 Mar 2019 12:03) (14 - 18)  SpO2: 100% (13 Mar 2019 12:03) (98% - 100%)    PE:  Gen: NAD, resting in bed  RUE:  Skin intact, no erythema, effusion  Severe ecchymoses located anterolateral on the arm  Deformity noted in shoulder  Pain with A/PROM of R shoulder  +AIN/PIN/M/U/R/Ax  SILT C5-T2  2+ RP    Secondary Survey:  No tenderness of left shoulder, FROM intact  No head trauma  +ttp along mid thoracic paraspinal muscles  No ttp along c/s spine  Mild   No ttp along bony prominences diffusely, other than mentioned above  +A/PROM intact in all joints diffusely, other than mentioned above  SILT/NVI diffusely  Compartments soft and compressible diffusely    XR R Shoulder: dislocation of reverse total shoulder replacement on Right  XR Chest: demonstrating 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

## 2019-03-13 NOTE — ED ADULT TRIAGE NOTE - CHIEF COMPLAINT QUOTE
pt presents to ED due to fall pt was found on the floor this AM - LOC denies hitting head pt was unable to get up until this AM, pt without any complaints

## 2019-03-13 NOTE — H&P ADULT - NSHPPHYSICALEXAM_GEN_ALL_CORE
Vital Signs Last 24 Hrs  T(C): 36.1 (13 Mar 2019 12:03), Max: 36.1 (13 Mar 2019 12:03)  T(F): 97 (13 Mar 2019 12:03), Max: 97 (13 Mar 2019 12:03)  HR: 65 (13 Mar 2019 12:03) (64 - 65)  BP: 121/56 (13 Mar 2019 12:03) (121/56 - 145/86)  BP(mean): --  RR: 14 (13 Mar 2019 12:03) (14 - 18)  SpO2: 100% (13 Mar 2019 12:03) (98% - 100%)

## 2019-03-13 NOTE — H&P ADULT - HISTORY OF PRESENT ILLNESS
89yo/M with PMH myeloproliferative disorder on Hydroxyurea, CAD s/p stent, HTN, hyperlipidemia, prior b/l TSR presented s/p mechanical fall at home. Patient tripped and fell and could not get up, was laying on the floor for few hours. In ED found to have RT shoulder dislocation and EKG changes. RT shoulder was reduced by ortho. EKG changes are not new, patient is asymptomatic and denies any chest pain.

## 2019-03-13 NOTE — ED PROVIDER NOTE - OBJECTIVE STATEMENT
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 Pt states that last night he was trying to switch the light when he fell onto the floor. According to the EMS report, pt was found on the floor. Pt now presents with left leg and left knee pain.

## 2019-03-13 NOTE — ED PROVIDER NOTE - CLINICAL SUMMARY MEDICAL DECISION MAKING FREE TEXT BOX
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 Eval per ETSL protocol, r/o ACS. 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 VS hypothermia.  Eval per ATLS protocol, r/o ACS serial EKG and trop, Metabolic and hematologic evaluation.  Given hypothermia warming blanket and fluids.  Evaluate for rhabdo.  Symptomatic treatment and reassessment.  Admission for further evaluation and risk stratification.

## 2019-03-13 NOTE — ED ADULT NURSE REASSESSMENT NOTE - NS ED NURSE REASSESS COMMENT FT1
JEP collected & pending results. Droplet precautions in place. Admitted aware. Will continue to monitor

## 2019-03-14 DIAGNOSIS — I35.0 NONRHEUMATIC AORTIC (VALVE) STENOSIS: ICD-10-CM

## 2019-03-14 DIAGNOSIS — R94.31 ABNORMAL ELECTROCARDIOGRAM [ECG] [EKG]: ICD-10-CM

## 2019-03-14 DIAGNOSIS — R74.8 ABNORMAL LEVELS OF OTHER SERUM ENZYMES: ICD-10-CM

## 2019-03-14 DIAGNOSIS — I10 ESSENTIAL (PRIMARY) HYPERTENSION: ICD-10-CM

## 2019-03-14 LAB
ANION GAP SERPL CALC-SCNC: 9 MMOL/L — SIGNIFICANT CHANGE UP (ref 5–17)
BUN SERPL-MCNC: 29 MG/DL — HIGH (ref 7–23)
C DIFF BY PCR RESULT: SIGNIFICANT CHANGE UP
C DIFF TOX GENS STL QL NAA+PROBE: SIGNIFICANT CHANGE UP
CALCIUM SERPL-MCNC: 7.6 MG/DL — LOW (ref 8.5–10.1)
CHLORIDE SERPL-SCNC: 114 MMOL/L — HIGH (ref 96–108)
CK SERPL-CCNC: 204 U/L — SIGNIFICANT CHANGE UP (ref 26–308)
CO2 SERPL-SCNC: 19 MMOL/L — LOW (ref 22–31)
CREAT SERPL-MCNC: 1.5 MG/DL — HIGH (ref 0.5–1.3)
CULTURE RESULTS: NO GROWTH — SIGNIFICANT CHANGE UP
CULTURE RESULTS: SIGNIFICANT CHANGE UP
GLUCOSE SERPL-MCNC: 83 MG/DL — SIGNIFICANT CHANGE UP (ref 70–99)
HCT VFR BLD CALC: 35 % — LOW (ref 39–50)
HGB BLD-MCNC: 10.1 G/DL — LOW (ref 13–17)
MCHC RBC-ENTMCNC: 24.3 PG — LOW (ref 27–34)
MCHC RBC-ENTMCNC: 28.9 GM/DL — LOW (ref 32–36)
MCV RBC AUTO: 84.1 FL — SIGNIFICANT CHANGE UP (ref 80–100)
NRBC # BLD: 0 /100 WBCS — SIGNIFICANT CHANGE UP (ref 0–0)
PLATELET # BLD AUTO: 1197 K/UL — CRITICAL HIGH (ref 150–400)
POTASSIUM SERPL-MCNC: 4.3 MMOL/L — SIGNIFICANT CHANGE UP (ref 3.5–5.3)
POTASSIUM SERPL-SCNC: 4.3 MMOL/L — SIGNIFICANT CHANGE UP (ref 3.5–5.3)
RBC # BLD: 4.16 M/UL — LOW (ref 4.2–5.8)
RBC # FLD: 22.5 % — HIGH (ref 10.3–14.5)
SODIUM SERPL-SCNC: 142 MMOL/L — SIGNIFICANT CHANGE UP (ref 135–145)
SPECIMEN SOURCE: SIGNIFICANT CHANGE UP
SPECIMEN SOURCE: SIGNIFICANT CHANGE UP
WBC # BLD: 29.92 K/UL — HIGH (ref 3.8–10.5)
WBC # FLD AUTO: 29.92 K/UL — HIGH (ref 3.8–10.5)

## 2019-03-14 PROCEDURE — 99222 1ST HOSP IP/OBS MODERATE 55: CPT

## 2019-03-14 RX ORDER — HEPARIN SODIUM 5000 [USP'U]/ML
5000 INJECTION INTRAVENOUS; SUBCUTANEOUS EVERY 12 HOURS
Qty: 0 | Refills: 0 | Status: DISCONTINUED | OUTPATIENT
Start: 2019-03-14 | End: 2019-03-16

## 2019-03-14 RX ADMIN — HYDROXYUREA 1000 MILLIGRAM(S): 500 CAPSULE ORAL at 11:42

## 2019-03-14 RX ADMIN — AMLODIPINE BESYLATE 10 MILLIGRAM(S): 2.5 TABLET ORAL at 05:43

## 2019-03-14 RX ADMIN — Medication 1 MILLIGRAM(S): at 11:46

## 2019-03-14 RX ADMIN — HEPARIN SODIUM 5000 UNIT(S): 5000 INJECTION INTRAVENOUS; SUBCUTANEOUS at 17:12

## 2019-03-14 RX ADMIN — Medication 81 MILLIGRAM(S): at 11:42

## 2019-03-14 RX ADMIN — PANTOPRAZOLE SODIUM 40 MILLIGRAM(S): 20 TABLET, DELAYED RELEASE ORAL at 05:43

## 2019-03-14 RX ADMIN — ATORVASTATIN CALCIUM 40 MILLIGRAM(S): 80 TABLET, FILM COATED ORAL at 21:39

## 2019-03-14 NOTE — PHYSICAL THERAPY INITIAL EVALUATION ADULT - ACTIVE RANGE OF MOTION EXAMINATION, REHAB EVAL
bilateral upper extremity Active ROM was WFL (within functional limits)/bilateral  lower extremity Active ROM was WFL (within functional limits)/except R shoulder NTd

## 2019-03-14 NOTE — PHYSICAL THERAPY INITIAL EVALUATION ADULT - PERTINENT HX OF CURRENT PROBLEM, REHAB EVAL
89 yo M admitted post fall at home. Patient tripped and fell and could not get up, was laying on the floor for few hours. In ED found to have RT shoulder dislocation and EKG changes. RT shoulder was reduced by ortho. EKG changes are not new,

## 2019-03-14 NOTE — CHART NOTE - NSCHARTNOTEFT_GEN_A_CORE
Notified by RN, pt had multiple bouts of diarrhea  Pt is s/p abx in ED    A/P:   Diarrhea likely gastroenteritis; c.diff  - F/u GI PCR  - F/u C.diff  - Isolate pt  - Continue to monitor

## 2019-03-14 NOTE — PHYSICAL THERAPY INITIAL EVALUATION ADULT - MODALITIES TREATMENT COMMENTS
Patient  left in chair with CBIR and chair alarm active. Patient instructed to call for assistance back to bed or the bathroom, understands same. Set up with breakfast tray.

## 2019-03-14 NOTE — PHYSICAL THERAPY INITIAL EVALUATION ADULT - GENERAL OBSERVATIONS, REHAB EVAL
Patient received in bed on C-diff precautions.  Patient refused eval. "I'm not ready, come back next week".  Explained out of bed benefits and encouraged participation, but patient refused.  RN informed.  Recent admit in February to r/o CVA. (CT Head -). Patient received in bed on C-diff precautions. Patient agreeable to eval. +IV. +R UE in sling, but patient observed  to Move R UE around freely.

## 2019-03-14 NOTE — PHYSICAL THERAPY INITIAL EVALUATION ADULT - ADDITIONAL COMMENTS
Patient no longer drives, takes taxis and busses. Has a standard walker with a pouch in front. Patient no longer drives, takes taxis and busses.

## 2019-03-14 NOTE — PHYSICAL THERAPY INITIAL EVALUATION ADULT - DISCHARGE DISPOSITION, PT EVAL
Patient would benefit from continued physical therapy  services to reach maximal potential in a Sub Acute Rehab facility/rehabilitation facility

## 2019-03-14 NOTE — PROGRESS NOTE ADULT - SUBJECTIVE AND OBJECTIVE BOX
CC- mechanical fall, RT shoulder dislocation (14 Mar 2019 15:31)    HPI:  89yo/M with PMH myeloproliferative disorder on Hydroxyurea, CAD s/p stent, HTN, hyperlipidemia, prior b/l TSR presented s/p mechanical fall at home. Patient tripped and fell and could not get up, was laying on the floor for few hours. In ED found to have RT shoulder dislocation and EKG changes. RT shoulder was reduced by ortho. EKG changes are not new, patient is asymptomatic and denies any chest pain. (13 Mar 2019 17:38)    3/14/19- +diarrhea    Review of system- All 10 systems reviewed and is as per HPI otherwise negative.     T(C): 36.4 (19 @ 11:47), Max: 37.3 (19 @ 19:32)  HR: 56 (19 @ 11:47) (56 - 62)  BP: 138/55 (19 @ 11:47) (100/60 - 148/57)  RR: 17 (19 @ 11:47) (16 - 18)  SpO2: 94% (19 @ 11:47) (94% - 99%)  Wt(kg): --    LABS:                        10.1   29.92 )-----------( 1197     ( 14 Mar 2019 06:42 )             35.0     03-    142  |  114<H>  |  29<H>  ----------------------------<  83  4.3   |  19<L>  |  1.50<H>    Ca    7.6<L>      14 Mar 2019 06:42    TPro  5.3<L>  /  Alb  2.6<L>  /  TBili  0.3  /  DBili  x   /  AST  32  /  ALT  14  /  AlkPhos  82  03-13    PT/INR - ( 13 Mar 2019 09:14 )   PT: 12.7 sec;   INR: 1.14 ratio       PTT - ( 13 Mar 2019 09:14 )  PTT:34.5 sec    Urinalysis Basic - ( 13 Mar 2019 12:08 )  Color: Yellow / Appearance: Clear / S.010 / pH: x  Gluc: x / Ketone: Negative  / Bili: Negative / Urobili: Negative mg/dL   Blood: x / Protein: 30 mg/dL / Nitrite: Negative   Leuk Esterase: Negative / RBC: 0-2 /HPF / WBC 0-2   Sq Epi: x / Non Sq Epi: Occasional / Bacteria: Occasional    CARDIAC MARKERS ( 14 Mar 2019 06:42 )  x     / x     / 204 U/L / x     / x      CARDIAC MARKERS ( 13 Mar 2019 12:48 )  0.246 ng/mL / x     / x     / x     / x      CARDIAC MARKERS ( 13 Mar 2019 09:14 )  0.205 ng/mL / x     / 417 U/L / x     / x        RADIOLOGY & ADDITIONAL TESTS:      PHYSICAL EXAM:  GENERAL: NAD, well-groomed, well-developed  HEAD:  Atraumatic, Normocephalic  EYES: EOMI, PERRLA, conjunctiva and sclera clear  HEENT: Moist mucous membranes  NECK: Supple, No JVD  NERVOUS SYSTEM:  Alert & Oriented X3, Motor Strength 5/5 B/L upper and lower extremities; DTRs 2+ intact and symmetric  CHEST/LUNG: Clear to auscultation bilaterally; No rales, rhonchi, wheezing, or rubs  HEART: Regular rate and rhythm; No murmurs, rubs, or gallops  ABDOMEN: Soft, Nontender, Nondistended; Bowel sounds present  GENITOURINARY- Voiding, no palpable bladder  EXTREMITIES:  2+ Peripheral Pulses, No clubbing, cyanosis, or edema  MUSCULOSKELTAL- RT shoulder swelling with hematoma  SKIN-no rash, no lesion  CNS- alert, oriented X3, non focal     MEDICATIONS  (STANDING):  amLODIPine   Tablet 10 milliGRAM(s) Oral daily  aspirin enteric coated 81 milliGRAM(s) Oral daily  atorvastatin 40 milliGRAM(s) Oral at bedtime  folic acid 1 milliGRAM(s) Oral daily  heparin  Injectable 5000 Unit(s) SubCutaneous every 12 hours  hydroxyurea 1000 milliGRAM(s) Oral daily  lactated ringers. 1000 milliLiter(s) (75 mL/Hr) IV Continuous <Continuous>  pantoprazole    Tablet 40 milliGRAM(s) Oral before breakfast    MEDICATIONS  (PRN):  acetaminophen   Tablet .. 650 milliGRAM(s) Oral every 6 hours PRN Temp greater or equal to 38C (100.4F), Mild Pain (1 - 3)  aluminum hydroxide/magnesium hydroxide/simethicone Suspension 30 milliLiter(s) Oral every 4 hours PRN Dyspepsia  ondansetron Injectable 4 milliGRAM(s) IV Push every 6 hours PRN Nausea    Assessment/Plan  #S/p mechanical fall with RT shoulder dislocation s/p reduction  #Prior b/l TSR  Ortho eval appreciated  Keep RLE in sling  NWB to RLE  PT eval- possible APRIL    #Diarrhea enterotox. E.coli  ID eval  Will DC colace and miralax    #Borderline troponins likely 2 to demand ischemia  #CAD h/o stent  Clinically asymptomatic  EKG changes discussed between ED MD and DR Castro- similar to prior old EKG  Cont Aspirin, statin  Cardio eval DR Camarena appreciated    #Myeloproliferative disorder  Platelets 1172, previously around 200 in 2018  Hem eval Dr Lau appreciated- hydroxyurea increased to 1000mg daily    #HTN/hyperlipidemia- stable    #Dispo- can transfer to med-surg. WIll need to wait till diarrhea better prior to APRIL. Cont PT

## 2019-03-14 NOTE — PHYSICAL THERAPY INITIAL EVALUATION ADULT - LIVES WITH, PROFILE
Pt reports that he is normally very independent and [place] alone [place]alone

## 2019-03-14 NOTE — CONSULT NOTE ADULT - NSICDXPROBLEM_GEN_ALL_CORE_FT
PROBLEM DIAGNOSES  Problem: Hypertension, essential  Recommendation: Satisfactory control; continue amlodipine.    Problem: Elevated troponin  Recommendation: No ischemic symptoms; minimally elevated serum troponin in the setting of renal insufficiency, mild rhabdomyolysis / trauamatic injury; continue aspirin.    Problem: Aortic stenosis  Recommendation: Moderate aortic stenosis on echocardiography earlier this year; no intervention at this time    Problem: Abnormal electrocardiogram  Recommendation: Abnormalities are similar to previous tracings

## 2019-03-14 NOTE — PHYSICAL THERAPY INITIAL EVALUATION ADULT - PRECAUTIONS/LIMITATIONS, REHAB EVAL
cardiac precautions/fall precautions C-diff/isolation precautions/cardiac precautions/fall precautions

## 2019-03-14 NOTE — CONSULT NOTE ADULT - SUBJECTIVE AND OBJECTIVE BOX
CHIEF COMPLAINT: Patient is a 89 year old Male who presents with a chief complaint of mechanical fall, RT shoulder dislocation (13 Mar 2019 18:58)    HPI: 89 year old man with a history of myeloproliferative disorder, HTN, DVT, GI bleeding, OA, moderate aortic stenosis, suspected TIA (2/2019), RBBB who presented to the ER on 3/13/19 following a trip and fall in his bedroom -- found to have a shoulder dislocation.  He says that he tripped after turning off his bedroom light; no loss of consciousness.  He is a poor informant (confused / forgetful); only complaint now is back and shoulder pain; denies: syncope, angina, palpitations.    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 1000 milliLiter(s) (75 mL/Hr) IV Continuous <Continuous>  pantoprazole    Tablet 40 milliGRAM(s) Oral before breakfast  polyethylene glycol 3350 17 Gram(s) Oral daily    MEDICATIONS  (PRN):  acetaminophen   Tablet .. 650 milliGRAM(s) Oral every 6 hours PRN Temp greater or equal to 38C (100.4F), Mild Pain (1 - 3)  aluminum hydroxide/magnesium hydroxide/simethicone Suspension 30 milliLiter(s) Oral every 4 hours PRN Dyspepsia  ondansetron Injectable 4 milliGRAM(s) IV Push every 6 hours PRN Nausea    Allergies: No Known Allergies    REVIEW OF SYSTEMS:  CONSTITUTIONAL: + Generalized weakness  Eyes: No visual changes  NECK: No pain or stiffness  RESPIRATORY: No shortness of breath  CARDIOVASCULAR: No chest pain or palpitations  GASTROINTESTINAL: No abdominal pain. No nausea, vomiting; + diarrhea  GENITOURINARY: No dysuria  NEUROLOGICAL: No numbness.  SKIN: No itching or rash  All other review of systems is negative unless indicated above    VITAL SIGNS:   Vital Signs Last 24 Hrs  T(C): 36.4 (14 Mar 2019 11:47), Max: 37.3 (13 Mar 2019 19:32)  T(F): 97.6 (14 Mar 2019 11:47), Max: 99.1 (13 Mar 2019 19:32)  HR: 56 (14 Mar 2019 11:47) (56 - 62)  BP: 138/55 (14 Mar 2019 11:47) (100/60 - 148/57)  RR: 17 (14 Mar 2019 11:47) (16 - 18)  SpO2: 94% (14 Mar 2019 11:47) (94% - 99%)    PHYSICAL EXAM:  Constitutional: NAD, awake, appears stated age  HEENT:  No oral cyanosis.  Pulmonary: Non-labored, breath sounds are clear bilaterally  Cardiovascular: S1 and S2, regular rate and rhythm, systolic murmur  Gastrointestinal: Bowel Sounds present, soft, nontender.   Lymph: No peripheral edema. No cervical lymphadenopathy.  Skin: No rashes.  R arm in sling  Psych:  Mood & affect appropriate    LABS:                      10.1   29.92 )-----------( 1197     ( 14 Mar 2019 06:42 )             35.0       142    |  114    |  29     ----------------------------<  83     4.3     |  19     |  1.50     CARDIAC MARKERS ( 14 Mar 2019 06:42 ) x     / x     / 204 U/L / x     / x      CARDIAC MARKERS ( 13 Mar 2019 12:48 ) 0.246 ng/mL / x     / x     / x     / x      CARDIAC MARKERS ( 13 Mar 2019 09:14 ) 0.205 ng/mL / x     / 417 U/L / x     / x        ECG (admission):  Normal sinus rhythm.  RBBB.  T wave abnormality -- similar to past tracings.    Transthoracic Echocardiogram (02.06.19 @ 13:47):   Left ventricle systolic function appears preserved; segmental wall motion abnormalities noted. Hypokinesis of the inferoseptal and basal inferior walls. Estimated Ejection Fraction is 50%.   Mild concentric left ventricular hypertrophy is present.   The left atrium is moderately dilated.   The right atrium appears moderately dilated.   The right ventricle exhibits mild dilation with preserved contractility.   Moderate (2+) mitral regurgitation is present.    EA reversal of the mitral inflow consistent with reduced compliance of the left ventricle.   Significant fibrocalcific changes noted to the aortic valve leaflets with   Moderate aortic stenosis.   Trace to mild aortic regurgitation.   Mild pulmonary hypertension.   Pulmonic valve not well seen.   Trace pulmonic valvular regurgitation.

## 2019-03-15 RX ADMIN — ATORVASTATIN CALCIUM 40 MILLIGRAM(S): 80 TABLET, FILM COATED ORAL at 21:35

## 2019-03-15 RX ADMIN — AMLODIPINE BESYLATE 10 MILLIGRAM(S): 2.5 TABLET ORAL at 12:02

## 2019-03-15 RX ADMIN — HYDROXYUREA 1000 MILLIGRAM(S): 500 CAPSULE ORAL at 12:02

## 2019-03-15 RX ADMIN — Medication 81 MILLIGRAM(S): at 12:02

## 2019-03-15 RX ADMIN — Medication 1 MILLIGRAM(S): at 12:02

## 2019-03-15 RX ADMIN — HEPARIN SODIUM 5000 UNIT(S): 5000 INJECTION INTRAVENOUS; SUBCUTANEOUS at 17:24

## 2019-03-15 NOTE — PROGRESS NOTE ADULT - SUBJECTIVE AND OBJECTIVE BOX
CC- mechanical fall, RT shoulder dislocation (14 Mar 2019 15:31)    HPI:  89yo/M with PMH myeloproliferative disorder on Hydroxyurea, CAD s/p stent, HTN, hyperlipidemia, prior b/l TSR presented s/p mechanical fall at home. Patient tripped and fell and could not get up, was laying on the floor for few hours. In ED found to have RT shoulder dislocation and EKG changes. RT shoulder was reduced by ortho. EKG changes are not new, patient is asymptomatic and denies any chest pain. (13 Mar 2019 17:38)    3/14/19- +diarrhea  3/15/19 no further diarrhea since yesterday am    Review of system- All 10 systems reviewed and is as per HPI otherwise negative.     Vital Signs Last 24 Hrs  T(C): 36.7 (15 Mar 2019 05:11), Max: 36.9 (14 Mar 2019 20:38)  T(F): 98.1 (15 Mar 2019 05:11), Max: 98.4 (14 Mar 2019 20:38)  HR: 81 (15 Mar 2019 05:11) (59 - 81)  BP: 147/66 (15 Mar 2019 05:11) (147/66 - 149/72)  BP(mean): --  RR: 17 (15 Mar 2019 05:11) (17 - 17)  SpO2: 97% (15 Mar 2019 05:11) (93% - 97%)    LABS:                        10.1   29.92 )-----------( 1197     ( 14 Mar 2019 06:42 )             35.0     14 Mar 2019 06:42    142    |  114    |  29     ----------------------------<  83     4.3     |  19     |  1.50     Ca    7.6        14 Mar 2019 06:42     RADIOLOGY & ADDITIONAL TESTS:      PHYSICAL EXAM:  GENERAL: NAD, well-groomed, well-developed  HEAD:  Atraumatic, Normocephalic  EYES: EOMI, PERRLA, conjunctiva and sclera clear  HEENT: Moist mucous membranes  NECK: Supple, No JVD  NERVOUS SYSTEM:  Alert & Oriented X3, Motor Strength 5/5 B/L upper and lower extremities; DTRs 2+ intact and symmetric  CHEST/LUNG: Clear to auscultation bilaterally; No rales, rhonchi, wheezing, or rubs  HEART: Regular rate and rhythm; No murmurs, rubs, or gallops  ABDOMEN: Soft, Nontender, Nondistended; Bowel sounds present  GENITOURINARY- Voiding, no palpable bladder  EXTREMITIES:  2+ Peripheral Pulses, No clubbing, cyanosis, or edema  MUSCULOSKELTAL- RT shoulder swelling with hematoma- improving  SKIN-no rash, no lesion  CNS- alert, oriented X3, non focal     MEDICATIONS  (STANDING):  amLODIPine   Tablet 10 milliGRAM(s) Oral daily  aspirin enteric coated 81 milliGRAM(s) Oral daily  atorvastatin 40 milliGRAM(s) Oral at bedtime  folic acid 1 milliGRAM(s) Oral daily  heparin  Injectable 5000 Unit(s) SubCutaneous every 12 hours  hydroxyurea 1000 milliGRAM(s) Oral daily  lactated ringers. 1000 milliLiter(s) (75 mL/Hr) IV Continuous <Continuous>  pantoprazole    Tablet 40 milliGRAM(s) Oral before breakfast    MEDICATIONS  (PRN):  acetaminophen   Tablet .. 650 milliGRAM(s) Oral every 6 hours PRN Temp greater or equal to 38C (100.4F), Mild Pain (1 - 3)  aluminum hydroxide/magnesium hydroxide/simethicone Suspension 30 milliLiter(s) Oral every 4 hours PRN Dyspepsia  ondansetron Injectable 4 milliGRAM(s) IV Push every 6 hours PRN Nausea    Assessment/Plan  #S/p mechanical fall with RT shoulder dislocation s/p reduction  #Prior b/l TSR  Ortho eval appreciated  Keep RLE in sling  NWB to RLE  PT eval- possible [facility]    #Diarrhea enterotox. E.coli  ID eval appreciated  Diarrhea resolved. No need for abx    #Borderline troponins likely 2 to demand ischemia  #CAD h/o stent  Clinically asymptomatic  EKG changes discussed between ED MD and DR Castro- similar to prior old EKG  Cont Aspirin, statin  Cardio eval DR Camarena appreciated    #Myeloproliferative disorder  Platelets 1172, previously around 200 in 12/2018  Hem eval Dr Lau appreciated- hydroxyurea increased to 1000mg daily    #HTN/hyperlipidemia- stable    #Dispo- can transfer to med-surg. Cleared for [facility] tomorrow. D/w pt and DR Arana

## 2019-03-15 NOTE — CONSULT NOTE ADULT - REASON FOR ADMISSION
mechanical fall, RT shoulder dislocation
MI
mechanical fall, RT shoulder dislocation
mechanical fall, RT shoulder dislocation

## 2019-03-15 NOTE — CHART NOTE - NSCHARTNOTEFT_GEN_A_CORE
Mr. Ayon is confused this morning and not willing to allow examination -- pushes me away and tells me to leave.    -Will f/u as needed if patient agreeable; please call with questions.

## 2019-03-15 NOTE — CONSULT NOTE ADULT - SUBJECTIVE AND OBJECTIVE BOX
Patient is a 89y old  Male who presents with a chief complaint of mechanical fall, RT shoulder dislocation (14 Mar 2019 16:38)    HPI:  89yo/M with PMH myeloproliferative disorder on Hydroxyurea, CAD s/p stent, HTN, hyperlipidemia, prior b/l TSR presented s/p mechanical fall at home admitted 3/13. Patient tripped and fell and could not get up, was laying on the floor for few hours. In ED found to have RT shoulder dislocation and EKG changes. RT shoulder was reduced by ortho. EKG changes are not new, patient is asymptomatic and denies any chest pain. Hospital course c/b diarrhea 3/13 stool GI pcr growing enterotoxigenic ecoli, diarrhea since then has improved, no abd pain, afebrile, was initially given dose of IV abx on admit.      PMH: as above  PSH: as above  Meds: per reconciliation sheet, noted below  MEDICATIONS  (STANDING):  amLODIPine   Tablet 10 milliGRAM(s) Oral daily  aspirin enteric coated 81 milliGRAM(s) Oral daily  atorvastatin 40 milliGRAM(s) Oral at bedtime  folic acid 1 milliGRAM(s) Oral daily  heparin  Injectable 5000 Unit(s) SubCutaneous every 12 hours  hydroxyurea 1000 milliGRAM(s) Oral daily  lactated ringers. 1000 milliLiter(s) (75 mL/Hr) IV Continuous <Continuous>  pantoprazole    Tablet 40 milliGRAM(s) Oral before breakfast      Allergies    No Known Allergies    Intolerances      Social: no smoking, no alcohol, no illegal drugs; no recent travel, no exposure to TB  FAMILY HISTORY:  No pertinent family history in first degree relatives     no history of premature cardiovascular disease in first degree relatives    ROS: the patient denies fever, no chills, no HA, no dizziness, no sore throat, no blurry vision, no CP, no palpitations, no SOB, no cough, no abdominal pain, no diarrhea, no N/V, no dysuria, no leg pain, no claudication, no rash, no joint aches, no rectal pain or bleeding, no night sweats  All other systems reviewed and are negative    Vital Signs Last 24 Hrs  T(C): 36.7 (15 Mar 2019 05:11), Max: 36.9 (14 Mar 2019 20:38)  T(F): 98.1 (15 Mar 2019 05:11), Max: 98.4 (14 Mar 2019 20:38)  HR: 81 (15 Mar 2019 05:11) (56 - 81)  BP: 147/66 (15 Mar 2019 05:11) (138/55 - 149/72)  BP(mean): --  RR: 17 (15 Mar 2019 05:11) (17 - 17)  SpO2: 97% (15 Mar 2019 05:11) (93% - 97%)  Daily     Daily     PE:  Constitutional: frail looking  HEENT: NC/AT, EOMI, PERRLA, conjunctivae clear; ears and nose atraumatic; pharynx benign  Neck: supple; thyroid not palpable  Back: no tenderness  Respiratory: respiratory effort normal; clear to auscultation  Cardiovascular: S1S2 regular, no murmurs  Abdomen: soft, not tender, not distended, positive BS; liver and spleen WNL  Genitourinary: no suprapubic tenderness  Lymphatic: no LN palpable  Musculoskeletal: no muscle tenderness, no joint swelling or tenderness  Extremities: no pedal edema  Neurological/ Psychiatric:  moving all extremities  Skin: no rashes; no palpable lesions R shoulder in sling    Labs: all available labs reviewed                        10.1   29.92 )-----------( 1197     ( 14 Mar 2019 06:42 )             35.0     03-14    142  |  114<H>  |  29<H>  ----------------------------<  83  4.3   |  19<L>  |  1.50<H>    Ca    7.6<L>      14 Mar 2019 06:42         Urinalysis Basic - ( 13 Mar 2019 12:08 )    Color: Yellow / Appearance: Clear / S.010 / pH: x  Gluc: x / Ketone: Negative  / Bili: Negative / Urobili: Negative mg/dL   Blood: x / Protein: 30 mg/dL / Nitrite: Negative   Leuk Esterase: Negative / RBC: 0-2 /HPF / WBC 0-2   Sq Epi: x / Non Sq Epi: Occasional / Bacteria: Occasional    GI PCR Panel, Stool (19 @ 00:33)    Culture Results:   Enterotoxigenic E. coli (ETEC)  DETECTED by PCR  *******Please Note:*******  GI panel PCR evaluates for:  Campylobacter, Plesiomonas shigelloides, Salmonella,  Vibrio, Yersinia enterocolitica, Enteroaggregative  Escherichia coli (EAEC), Enteropathogenic E.coli (EPEC),  Enterotoxigenic E. coli (ETEC) lt/st, Shiga-like  toxin-producing E. coli (STEC) stx1/stx2,  Shigella/ Enteroinvasive E. coli (EIEC), Cryptosporidium,  Cyclospora cayetanensis, Entamoeba histolytica,  Giardia lamblia, Adenovirus F 40/41, Astrovirus,  Norovirus GI/GII, Rotavirus A, Sapovirus    Culture - Urine (19 @ 12:08)    Specimen Source: .Urine None    Culture Results:   No growth    Culture - Blood (19 @ 10:23)    Specimen Source: .Blood None    Culture Results:   No growth to date.        Radiology: all available radiological tests reviewed      EXAM:  XR SHOULDER VA 1 VIEW RT                              PROCEDURE DATE:  2019          INTERPRETATION:  Clinical information: Right reverse total shoulder   arthroplasty, with dislocation. Postreduction views.    2 views of the right shoulder    COMPARISON: Same day radiographs, 1253 hours    FINDINGS: There is now anatomic alignment of the glenoid and humeral   components of the arthroplasty. No periprosthetic fracture is identified.    IMPRESSION:    Successful reduction    < end of copied text >    Advanced directives addressed: full resuscitation

## 2019-03-15 NOTE — CONSULT NOTE ADULT - ASSESSMENT
89yo/M with PMH myeloproliferative disorder on Hydroxyurea, CAD s/p stent, HTN, hyperlipidemia, prior b/l TSR presented s/p mechanical fall at home admitted 3/13. Patient tripped and fell and could not get up, was laying on the floor for few hours. In ED found to have RT shoulder dislocation and EKG changes. RT shoulder was reduced by ortho. EKG changes are not new, patient is asymptomatic and denies any chest pain. Hospital course c/b diarrhea 3/13 stool GI pcr growing enterotoxigenic ecoli, diarrhea since then has improved, no abd pain, afebrile, was initially given dose of IV abx on admit.    1. diarrhea. enterotoxigenic ecoli. myeloproliferative disorder. leukocytosis  - elevated wbc ct probable reactive d/t MPD  - no further diarrhea  - c diff pcr (-)  - treatment is supportive as diarrhea is self-limiting  - monitor off abx  - CT chest/abd/pelvis unrevealing for additional infectious foci  - supportive care/hydration  - monitor temps    2. other issues - care per medicine

## 2019-03-16 ENCOUNTER — TRANSCRIPTION ENCOUNTER (OUTPATIENT)
Age: 84
End: 2019-03-16

## 2019-03-16 VITALS — OXYGEN SATURATION: 98 % | HEART RATE: 71 BPM | SYSTOLIC BLOOD PRESSURE: 137 MMHG | DIASTOLIC BLOOD PRESSURE: 59 MMHG

## 2019-03-16 LAB
ANION GAP SERPL CALC-SCNC: 10 MMOL/L — SIGNIFICANT CHANGE UP (ref 5–17)
BUN SERPL-MCNC: 25 MG/DL — HIGH (ref 7–23)
CALCIUM SERPL-MCNC: 8 MG/DL — LOW (ref 8.5–10.1)
CHLORIDE SERPL-SCNC: 111 MMOL/L — HIGH (ref 96–108)
CO2 SERPL-SCNC: 21 MMOL/L — LOW (ref 22–31)
CREAT SERPL-MCNC: 1.42 MG/DL — HIGH (ref 0.5–1.3)
GLUCOSE SERPL-MCNC: 86 MG/DL — SIGNIFICANT CHANGE UP (ref 70–99)
HCT VFR BLD CALC: 33.1 % — LOW (ref 39–50)
HGB BLD-MCNC: 9.6 G/DL — LOW (ref 13–17)
MAGNESIUM SERPL-MCNC: 2.1 MG/DL — SIGNIFICANT CHANGE UP (ref 1.6–2.6)
MCHC RBC-ENTMCNC: 24.2 PG — LOW (ref 27–34)
MCHC RBC-ENTMCNC: 29 GM/DL — LOW (ref 32–36)
MCV RBC AUTO: 83.4 FL — SIGNIFICANT CHANGE UP (ref 80–100)
NRBC # BLD: 0 /100 WBCS — SIGNIFICANT CHANGE UP (ref 0–0)
PLATELET # BLD AUTO: 953 K/UL — HIGH (ref 150–400)
POTASSIUM SERPL-MCNC: 4.4 MMOL/L — SIGNIFICANT CHANGE UP (ref 3.5–5.3)
POTASSIUM SERPL-SCNC: 4.4 MMOL/L — SIGNIFICANT CHANGE UP (ref 3.5–5.3)
RBC # BLD: 3.97 M/UL — LOW (ref 4.2–5.8)
RBC # FLD: 21.9 % — HIGH (ref 10.3–14.5)
SODIUM SERPL-SCNC: 142 MMOL/L — SIGNIFICANT CHANGE UP (ref 135–145)
WBC # BLD: 26.6 K/UL — HIGH (ref 3.8–10.5)
WBC # FLD AUTO: 26.6 K/UL — HIGH (ref 3.8–10.5)

## 2019-03-16 RX ORDER — ATORVASTATIN CALCIUM 80 MG/1
1 TABLET, FILM COATED ORAL
Qty: 0 | Refills: 0 | DISCHARGE
Start: 2019-03-16

## 2019-03-16 RX ORDER — HEPARIN SODIUM 5000 [USP'U]/ML
5000 INJECTION INTRAVENOUS; SUBCUTANEOUS
Qty: 0 | Refills: 0 | DISCHARGE
Start: 2019-03-16

## 2019-03-16 RX ORDER — ACETAMINOPHEN 500 MG
2 TABLET ORAL
Qty: 0 | Refills: 0 | DISCHARGE
Start: 2019-03-16

## 2019-03-16 RX ORDER — ASPIRIN/CALCIUM CARB/MAGNESIUM 324 MG
1 TABLET ORAL
Qty: 0 | Refills: 0 | DISCHARGE
Start: 2019-03-16

## 2019-03-16 RX ORDER — FOLIC ACID 0.8 MG
1 TABLET ORAL
Qty: 0 | Refills: 0 | DISCHARGE
Start: 2019-03-16

## 2019-03-16 RX ORDER — AMLODIPINE BESYLATE 2.5 MG/1
1 TABLET ORAL
Qty: 0 | Refills: 0 | DISCHARGE
Start: 2019-03-16

## 2019-03-16 RX ORDER — PANTOPRAZOLE SODIUM 20 MG/1
1 TABLET, DELAYED RELEASE ORAL
Qty: 0 | Refills: 0 | DISCHARGE
Start: 2019-03-16

## 2019-03-16 RX ORDER — HYDROXYUREA 500 MG/1
2 CAPSULE ORAL
Qty: 0 | Refills: 0 | DISCHARGE
Start: 2019-03-16

## 2019-03-16 RX ADMIN — HYDROXYUREA 1000 MILLIGRAM(S): 500 CAPSULE ORAL at 11:09

## 2019-03-16 RX ADMIN — HEPARIN SODIUM 5000 UNIT(S): 5000 INJECTION INTRAVENOUS; SUBCUTANEOUS at 06:07

## 2019-03-16 RX ADMIN — AMLODIPINE BESYLATE 10 MILLIGRAM(S): 2.5 TABLET ORAL at 06:07

## 2019-03-16 RX ADMIN — Medication 81 MILLIGRAM(S): at 11:09

## 2019-03-16 RX ADMIN — PANTOPRAZOLE SODIUM 40 MILLIGRAM(S): 20 TABLET, DELAYED RELEASE ORAL at 06:23

## 2019-03-16 RX ADMIN — Medication 1 MILLIGRAM(S): at 11:09

## 2019-03-16 NOTE — DISCHARGE NOTE NURSING/CASE MANAGEMENT/SOCIAL WORK - NSDCDPATPORTLINK_GEN_ALL_CORE
You can access the [facility], offered by [facility], by registering with the following website: [email or web address]

## 2019-03-16 NOTE — DISCHARGE NOTE PROVIDER - CARE PROVIDER_API CALL
Anthony Alexis)  Orthopaedic Surgery  [address] [place] [address]  Phone: [phone]  Fax: [phone]  Follow Up Time:     Mohit Hermosillo)  Cardiovascular Disease  [address] [place] [address]  Phone: [phone]  Fax: [phone]  Follow Up Time: Anthony Alexis)  Orthopaedic Surgery  [address] [place] [address]  Phone: [phone]  Fax: [phone]  Follow Up Time:     Mohit Hermosillo)  Cardiovascular Disease  [address] [place] [address]  Phone: [phone]  Fax: [phone]  Follow Up Time:     Malena Renee)  Hematology; Medical Oncology  [address] [facility], [place] [address]  Phone: [phone]  Fax: [phone]  Follow Up Time:

## 2019-03-16 NOTE — PROGRESS NOTE ADULT - SUBJECTIVE AND OBJECTIVE BOX
CC- mechanical fall, RT shoulder dislocation (14 Mar 2019 15:31)    HPI:  89yo/M with PMH myeloproliferative disorder on Hydroxyurea, CAD s/p stent, HTN, hyperlipidemia, prior b/l TSR presented s/p mechanical fall at home. Patient tripped and fell and could not get up, was laying on the floor for few hours. In ED found to have RT shoulder dislocation and EKG changes. RT shoulder was reduced by ortho. EKG changes are not new, patient is asymptomatic and denies any chest pain. (13 Mar 2019 17:38)    3/14/19- +diarrhea  3/15/19 no further diarrhea since yesterday am  3/16/19 no acute overnight events    Review of system- All 10 systems reviewed and is as per HPI otherwise negative.     Vital Signs Last 24 Hrs  T(C): 36.3 (16 Mar 2019 05:09), Max: 36.7 (15 Mar 2019 17:28)  T(F): 97.4 (16 Mar 2019 05:09), Max: 98.1 (15 Mar 2019 17:28)  HR: 58 (16 Mar 2019 05:09) (58 - 95)  BP: 150/58 (16 Mar 2019 05:09) (116/62 - 150/58)  BP(mean): --  RR: 18 (16 Mar 2019 00:45) (16 - 18)  SpO2: 100% (16 Mar 2019 05:09) (96% - 100%)    LABS:                        9.6    26.60 )-----------( 953      ( 16 Mar 2019 07:59 )             33.1     16 Mar 2019 07:59    142    |  111    |  25     ----------------------------<  86     4.4     |  21     |  1.42     Ca    8.0        16 Mar 2019 07:59  Mg     2.1       16 Mar 2019 07:59    RADIOLOGY & ADDITIONAL TESTS:      PHYSICAL EXAM:  GENERAL: NAD, well-groomed, well-developed  HEAD:  Atraumatic, Normocephalic  EYES: EOMI, PERRLA, conjunctiva and sclera clear  HEENT: Moist mucous membranes  NECK: Supple, No JVD  NERVOUS SYSTEM:  Alert & Oriented X3, Motor Strength 5/5 B/L upper and lower extremities; DTRs 2+ intact and symmetric  CHEST/LUNG: Clear to auscultation bilaterally; No rales, rhonchi, wheezing, or rubs  HEART: Regular rate and rhythm; No murmurs, rubs, or gallops  ABDOMEN: Soft, Nontender, Nondistended; Bowel sounds present  GENITOURINARY- Voiding, no palpable bladder  EXTREMITIES:  2+ Peripheral Pulses, No clubbing, cyanosis, or edema  MUSCULOSKELTAL- RT shoulder swelling with hematoma- improving  SKIN-no rash, no lesion  CNS- alert, oriented X3, non focal     MEDICATIONS  (STANDING):  amLODIPine   Tablet 10 milliGRAM(s) Oral daily  aspirin enteric coated 81 milliGRAM(s) Oral daily  atorvastatin 40 milliGRAM(s) Oral at bedtime  folic acid 1 milliGRAM(s) Oral daily  heparin  Injectable 5000 Unit(s) SubCutaneous every 12 hours  hydroxyurea 1000 milliGRAM(s) Oral daily  lactated ringers. 1000 milliLiter(s) (75 mL/Hr) IV Continuous <Continuous>  pantoprazole    Tablet 40 milliGRAM(s) Oral before breakfast    MEDICATIONS  (PRN):  acetaminophen   Tablet .. 650 milliGRAM(s) Oral every 6 hours PRN Temp greater or equal to 38C (100.4F), Mild Pain (1 - 3)  aluminum hydroxide/magnesium hydroxide/simethicone Suspension 30 milliLiter(s) Oral every 4 hours PRN Dyspepsia  ondansetron Injectable 4 milliGRAM(s) IV Push every 6 hours PRN Nausea    Assessment/Plan  #S/p mechanical fall with RT shoulder dislocation s/p reduction  #Prior b/l TSR  Ortho eval appreciated  Keep RLE in sling  NWB to RUE in sling till cleared by DR Alexis as outpatient  PT eval- possible APRIL    #Diarrhea enterotox. E.coli  ID eval appreciated  Diarrhea resolved. No need for abx    #Borderline troponins likely 2 to demand ischemia  #CAD h/o stent  Clinically asymptomatic  EKG changes discussed between ED MD and DR Castro- similar to prior old EKG  Cont Aspirin, statin  Cardio eval DR Camarena appreciated    #Myeloproliferative disorder  Platelets 1172, previously around 200 in 12/2018  Hem eval Dr Lau appreciated- hydroxyurea increased to 1000mg daily    #HTN/hyperlipidemia- stable    #Dispo- [facility]IL today. DC time 40 mins CC- mechanical fall, RT shoulder dislocation (14 Mar 2019 15:31)    HPI:  89yo/M with PMH myeloproliferative disorder on Hydroxyurea, CAD s/p stent, HTN, hyperlipidemia, prior b/l TSR presented s/p mechanical fall at home. Patient tripped and fell and could not get up, was laying on the floor for few hours. In ED found to have RT shoulder dislocation and EKG changes. RT shoulder was reduced by ortho. EKG changes are not new, patient is asymptomatic and denies any chest pain. (13 Mar 2019 17:38)    3/14/19- +diarrhea  3/15/19 no further diarrhea since yesterday am  3/16/19 no acute overnight events    Review of system- All 10 systems reviewed and is as per HPI otherwise negative.     Vital Signs Last 24 Hrs  T(C): 36.3 (16 Mar 2019 05:09), Max: 36.7 (15 Mar 2019 17:28)  T(F): 97.4 (16 Mar 2019 05:09), Max: 98.1 (15 Mar 2019 17:28)  HR: 58 (16 Mar 2019 05:09) (58 - 95)  BP: 150/58 (16 Mar 2019 05:09) (116/62 - 150/58)  BP(mean): --  RR: 18 (16 Mar 2019 00:45) (16 - 18)  SpO2: 100% (16 Mar 2019 05:09) (96% - 100%)    LABS:                        9.6    26.60 )-----------( 953      ( 16 Mar 2019 07:59 )             33.1     16 Mar 2019 07:59    142    |  111    |  25     ----------------------------<  86     4.4     |  21     |  1.42     Ca    8.0        16 Mar 2019 07:59  Mg     2.1       16 Mar 2019 07:59    RADIOLOGY & ADDITIONAL TESTS:      PHYSICAL EXAM:  GENERAL: NAD, well-groomed, well-developed  HEAD:  Atraumatic, Normocephalic  EYES: EOMI, PERRLA, conjunctiva and sclera clear  HEENT: Moist mucous membranes  NECK: Supple, No JVD  NERVOUS SYSTEM:  Alert & Oriented X3, Motor Strength 5/5 B/L upper and lower extremities; DTRs 2+ intact and symmetric  CHEST/LUNG: Clear to auscultation bilaterally; No rales, rhonchi, wheezing, or rubs  HEART: Regular rate and rhythm; No murmurs, rubs, or gallops  ABDOMEN: Soft, Nontender, Nondistended; Bowel sounds present  GENITOURINARY- Voiding, no palpable bladder  EXTREMITIES:  2+ Peripheral Pulses, No clubbing, cyanosis, or edema  MUSCULOSKELTAL- RT shoulder swelling with hematoma- improving  SKIN-no rash, no lesion  CNS- alert, oriented X3, non focal     MEDICATIONS  (STANDING):  amLODIPine   Tablet 10 milliGRAM(s) Oral daily  aspirin enteric coated 81 milliGRAM(s) Oral daily  atorvastatin 40 milliGRAM(s) Oral at bedtime  folic acid 1 milliGRAM(s) Oral daily  heparin  Injectable 5000 Unit(s) SubCutaneous every 12 hours  hydroxyurea 1000 milliGRAM(s) Oral daily  lactated ringers. 1000 milliLiter(s) (75 mL/Hr) IV Continuous <Continuous>  pantoprazole    Tablet 40 milliGRAM(s) Oral before breakfast    MEDICATIONS  (PRN):  acetaminophen   Tablet .. 650 milliGRAM(s) Oral every 6 hours PRN Temp greater or equal to 38C (100.4F), Mild Pain (1 - 3)  aluminum hydroxide/magnesium hydroxide/simethicone Suspension 30 milliLiter(s) Oral every 4 hours PRN Dyspepsia  ondansetron Injectable 4 milliGRAM(s) IV Push every 6 hours PRN Nausea    Assessment/Plan  #S/p mechanical fall with RT shoulder dislocation s/p reduction  #Prior b/l TSR  Ortho eval appreciated  Keep RLE in sling  NWB to RUE in sling till cleared by DR Alexis as outpatient  PT eval- possible APRIL    #Diarrhea enterotox. E.coli  ID eval appreciated  Diarrhea resolved. No need for abx    #Borderline troponins likely 2 to demand ischemia  #CAD h/o stent  Clinically asymptomatic  EKG changes discussed between ED MD and DR Castro- similar to prior old EKG  Cont Aspirin, statin  Cardio eval DR Camarena appreciated    #Myeloproliferative disorder  Platelets 1172, previously around 200 in 12/2018  Hem eval Dr Lau appreciated- hydroxyurea increased to 1000mg daily    #HTN/hyperlipidemia- stable    #Dispo- APRIL today. DC time 40 mins. D/w pt and message left for dia Pressley [phone]

## 2019-03-16 NOTE — DISCHARGE NOTE PROVIDER - PROVIDER TOKENS
PROVIDER:[TOKEN:[[id number]]],PROVIDER:[TOKEN:[[id number]]] PROVIDER:[TOKEN:[[id number]]],PROVIDER:[TOKEN:[[id number]]],PROVIDER:[TOKEN:[[id number]]]

## 2019-03-16 NOTE — DISCHARGE NOTE PROVIDER - CARE PROVIDERS DIRECT ADDRESSES
[email or web address]

## 2019-03-16 NOTE — DISCHARGE NOTE PROVIDER - NSDCCPCAREPLAN_GEN_ALL_CORE_FT
PRINCIPAL DISCHARGE DIAGNOSIS  Diagnosis: Dislocation of right shoulder joint  Assessment and Plan of Treatment:

## 2019-03-16 NOTE — DISCHARGE NOTE PROVIDER - HOSPITAL COURSE
Patient admitted s/p mechanical fall with RT shoulder dislocation, was reduced in ED and placed in sling. Hospital stay with one episode of diarrhea 2 to enterotoxigenic E.coli that was self-limiting. Patient stable for DC to [facility]

## 2019-03-16 NOTE — PROGRESS NOTE ADULT - REASON FOR ADMISSION
mechanical fall, RT shoulder dislocation

## 2019-03-19 DIAGNOSIS — T84.028A DISLOCATION OF OTHER INTERNAL JOINT PROSTHESIS, INITIAL ENCOUNTER: ICD-10-CM

## 2019-03-19 DIAGNOSIS — C94.6 MYELODYSPLASTIC DISEASE, NOT ELSEWHERE CLASSIFIED: ICD-10-CM

## 2019-03-19 DIAGNOSIS — I10 ESSENTIAL (PRIMARY) HYPERTENSION: ICD-10-CM

## 2019-03-19 DIAGNOSIS — Z79.899 OTHER LONG TERM (CURRENT) DRUG THERAPY: ICD-10-CM

## 2019-03-19 DIAGNOSIS — I24.8 OTHER FORMS OF ACUTE ISCHEMIC HEART DISEASE: ICD-10-CM
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
## 2019-03-19 DIAGNOSIS — A04.4 OTHER INTESTINAL ESCHERICHIA COLI INFECTIONS: ICD-10-CM

## 2019-03-19 DIAGNOSIS — E78.5 HYPERLIPIDEMIA, UNSPECIFIED: ICD-10-CM

## 2019-03-19 DIAGNOSIS — I25.10 ATHEROSCLEROTIC HEART DISEASE OF NATIVE CORONARY ARTERY WITHOUT ANGINA PECTORIS: ICD-10-CM

## 2019-03-19 DIAGNOSIS — S43.004A UNSPECIFIED DISLOCATION OF RIGHT SHOULDER JOINT, INITIAL ENCOUNTER: ICD-10-CM

## 2019-03-19 DIAGNOSIS — I82.521 CHRONIC EMBOLISM AND THROMBOSIS OF RIGHT ILIAC VEIN: ICD-10-CM

## 2019-03-19 DIAGNOSIS — I35.0 NONRHEUMATIC AORTIC (VALVE) STENOSIS: ICD-10-CM

## 2019-03-19 DIAGNOSIS — Z79.82 LONG TERM (CURRENT) USE OF ASPIRIN: ICD-10-CM

## 2019-03-19 PROBLEM — D47.1 CHRONIC MYELOPROLIFERATIVE DISEASE: Chronic | Status: ACTIVE | Noted: 2019-03-13

## 2019-04-01 ENCOUNTER — APPOINTMENT (OUTPATIENT)
Dept: HEMATOLOGY ONCOLOGY | Facility: CLINIC | Age: 84
End: 2019-04-01
Payer: MEDICARE

## 2019-04-01 ENCOUNTER — LABORATORY RESULT (OUTPATIENT)
Age: 84
End: 2019-04-01

## 2019-04-01 VITALS
DIASTOLIC BLOOD PRESSURE: 71 MMHG | HEART RATE: 61 BPM | TEMPERATURE: 97.7 F | WEIGHT: 127.38 LBS | BODY MASS INDEX: 21.22 KG/M2 | SYSTOLIC BLOOD PRESSURE: 148 MMHG | HEIGHT: 65 IN

## 2019-04-01 LAB
HCT VFR BLD CALC: 34.7 %
HGB BLD-MCNC: 10.4 G/DL
MCHC RBC-ENTMCNC: 24.8 PG
MCHC RBC-ENTMCNC: 30 GM/DL
MCV RBC AUTO: 82.7 FL
PLATELET # BLD AUTO: 440 K/UL
RBC # BLD: 4.2 M/UL
RBC # FLD: 22.6 %
WBC # FLD AUTO: 10.4 K/UL

## 2019-04-01 PROCEDURE — 36415 COLL VENOUS BLD VENIPUNCTURE: CPT

## 2019-04-01 PROCEDURE — 85025 COMPLETE CBC W/AUTO DIFF WBC: CPT

## 2019-04-01 PROCEDURE — 99214 OFFICE O/P EST MOD 30 MIN: CPT | Mod: 25

## 2019-04-01 RX ORDER — ACETAMINOPHEN 325 MG/1
325 TABLET ORAL
Refills: 0 | Status: ACTIVE | COMMUNITY
Start: 2019-04-01

## 2019-04-01 RX ORDER — HEPARIN SODIUM 5000 [USP'U]/ML
5000 INJECTION, SOLUTION INTRAVENOUS; SUBCUTANEOUS
Refills: 0 | Status: ACTIVE | COMMUNITY
Start: 2019-04-01

## 2019-04-01 RX ORDER — DOCUSATE SODIUM 100 MG/1
100 CAPSULE ORAL TWICE DAILY
Refills: 0 | Status: ACTIVE | COMMUNITY
Start: 2019-04-01

## 2019-04-01 RX ORDER — CYCLOBENZAPRINE HYDROCHLORIDE 5 MG/1
5 TABLET, FILM COATED ORAL
Qty: 10 | Refills: 0 | Status: DISCONTINUED | COMMUNITY
End: 2019-04-01

## 2019-04-01 RX ORDER — ASPIRIN ENTERIC COATED TABLETS 81 MG 81 MG/1
81 TABLET, DELAYED RELEASE ORAL DAILY
Refills: 0 | Status: ACTIVE | COMMUNITY
Start: 2019-04-01

## 2019-04-01 NOTE — HISTORY OF PRESENT ILLNESS
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 Treated at [facility], s/p multiple phlebotomies in the past.\par Currently on Hydrea for several years. \par No phlebotomy in few years.\par \par \par Diagnosed  with LLE DVT early Jan 2018.\par \par Hospitalized in later January 2018 with Hgb 7, evidence of GI bleeding. Transfused, anticoagulation stopped. Had IVC filter placed. \par \par In MArch 2019 in HH s/p fall with RT shoulder dislocation. Discharged to [facility].\par \par  [de-identified] : In APRIL now. On Hydrea 1000 mg daily. he would miss Hydrea doses when he was home- now medications administered by [facility] staff.\par Walking a little with walker. \par No new complaints. \par \par

## 2019-04-01 NOTE — ASSESSMENT
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 On Hydrea.\par \par Counts controlled with current dose-  continue Hydrea 1000 mg daily.\par CBC every 2 weeks while at [facility]. \par \par Anemia- chronic disease, anemia in setting of advanced MPD. Stable. \par \par Hx of recurrent GI bleeding. Off AC. \par Hx of DVT 2018- off AC due to GI bleeding. s/p IVC filter. \par \par \par Return visit in 6  weeks.

## 2019-04-01 NOTE — REVIEW OF SYSTEMS
[Patient Intake Form Reviewed] : Patient intake form was reviewed [Lower Ext Edema] : no lower extremity edema [Joint Pain] : joint pain [Joint Stiffness] : joint stiffness [Negative] : Psychiatric [FreeTextEntry8] : frequent urination- chronic  [de-identified] :  leg tremor/ numbness, tingling - chronic due to spine disease

## 2019-04-01 NOTE — PHYSICAL EXAM
[Normal] : affect appropriate [de-identified] : elderly male in wheelchair  [de-identified] :  leg edema better  [de-identified] : no overt focal deficits

## 2019-04-03 LAB
FERRITIN SERPL-MCNC: 78 NG/ML
IRON SATN MFR SERPL: 8 %
IRON SERPL-MCNC: 27 UG/DL
TIBC SERPL-MCNC: 329 UG/DL
UIBC SERPL-MCNC: 302 UG/DL

## 2019-05-13 ENCOUNTER — APPOINTMENT (OUTPATIENT)
Dept: HEMATOLOGY ONCOLOGY | Facility: CLINIC | Age: 84
End: 2019-05-13
Payer: MEDICARE

## 2019-05-13 ENCOUNTER — LABORATORY RESULT (OUTPATIENT)
Age: 84
End: 2019-05-13

## 2019-05-13 VITALS — HEART RATE: 86 BPM | SYSTOLIC BLOOD PRESSURE: 132 MMHG | TEMPERATURE: 97.3 F | DIASTOLIC BLOOD PRESSURE: 79 MMHG

## 2019-05-13 DIAGNOSIS — D47.1 CHRONIC MYELOPROLIFERATIVE DISEASE: ICD-10-CM

## 2019-05-13 DIAGNOSIS — Z51.81 ENCOUNTER FOR THERAPEUTIC DRUG LVL MONITORING: ICD-10-CM

## 2019-05-13 LAB
HCT VFR BLD CALC: 38.4 %
HGB BLD-MCNC: 11.6 G/DL
MCHC RBC-ENTMCNC: 25.3 PG
MCHC RBC-ENTMCNC: 30.3 GM/DL
MCV RBC AUTO: 83.5 FL
PLATELET # BLD AUTO: 663 K/UL
RBC # BLD: 4.6 M/UL
RBC # FLD: 20.8 %
WBC # FLD AUTO: 11.1 K/UL

## 2019-05-13 PROCEDURE — 36415 COLL VENOUS BLD VENIPUNCTURE: CPT

## 2019-05-13 PROCEDURE — 99214 OFFICE O/P EST MOD 30 MIN: CPT | Mod: 25

## 2019-05-13 PROCEDURE — 85025 COMPLETE CBC W/AUTO DIFF WBC: CPT

## 2019-05-13 NOTE — REVIEW OF SYSTEMS
[Patient Intake Form Reviewed] : Patient intake form was reviewed [Joint Pain] : joint pain [Joint Stiffness] : joint stiffness [Negative] : Psychiatric [Lower Ext Edema] : no lower extremity edema [FreeTextEntry8] : frequent urination- chronic  [de-identified] :  leg tremor/ numbness, tingling - chronic due to spine disease

## 2019-05-13 NOTE — HISTORY OF PRESENT ILLNESS
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 Treated at [facility], s/p multiple phlebotomies in the past.\par Currently on Hydrea for several years. \par No phlebotomy in few years.\par \par \par Diagnosed  with LLE DVT early Jan 2018.\par \par Hospitalized in later January 2018 with Hgb 7, evidence of GI bleeding. Transfused, anticoagulation stopped. Had IVC filter placed. \par \par In MArch 2019 in HH s/p fall with RT shoulder dislocation. Discharged to [facility].\par \par  [de-identified] : In  [employer] [facility] now. On Hydrea 1000 mg daily. he would miss Hydrea doses when he was home- now medications administered by [facility] staff.\par Walking a little with walker. \par No new complaints. \par \par

## 2019-05-13 NOTE — PHYSICAL EXAM
[Normal] : clear to auscultation bilaterally, no dullness, no wheezing [de-identified] : elderly male in wheelchair  [de-identified] :  leg edema better  [de-identified] : no overt focal deficits

## 2019-05-13 NOTE — ASSESSMENT
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 On Hydrea.\par \par Platelets increased since  last visit last month.\par \par Plan : increase Hydrea to 16 tabl per week:\par 3 tabl on Fridays and Saturdays and 2 tablets daily Sunday through Thursday.\par \par Anemia- chronic disease, anemia in setting of advanced MPD. Stable. \par \par Hx of recurrent GI bleeding. Off AC. \par Hx of DVT 2018- off AC due to GI bleeding. s/p IVC filter. \par \par \par Return visit in 4  weeks.

## 2019-05-14 LAB
FERRITIN SERPL-MCNC: 43 NG/ML
IRON SATN MFR SERPL: 6 %
IRON SERPL-MCNC: 23 UG/DL
TIBC SERPL-MCNC: 366 UG/DL
UIBC SERPL-MCNC: 343 UG/DL

## 2019-05-27 ENCOUNTER — INPATIENT (INPATIENT)
Facility: HOSPITAL | Age: 84
LOS: 0 days | End: 2019-05-28
Attending: INTERNAL MEDICINE | Admitting: INTERNAL MEDICINE
Payer: MEDICARE

## 2019-05-27 VITALS
HEART RATE: 115 BPM | OXYGEN SATURATION: 77 % | SYSTOLIC BLOOD PRESSURE: 65 MMHG | WEIGHT: 123.9 LBS | DIASTOLIC BLOOD PRESSURE: 49 MMHG | HEIGHT: 65 IN | RESPIRATION RATE: 20 BRPM | TEMPERATURE: 97 F

## 2019-05-27 DIAGNOSIS — Z98.890 OTHER SPECIFIED POSTPROCEDURAL STATES: Chronic | ICD-10-CM

## 2019-05-27 LAB
ALBUMIN SERPL ELPH-MCNC: 2 G/DL — LOW (ref 3.3–5)
ALP SERPL-CCNC: 63 U/L — SIGNIFICANT CHANGE UP (ref 40–120)
ALT FLD-CCNC: 14 U/L — SIGNIFICANT CHANGE UP (ref 12–78)
ANION GAP SERPL CALC-SCNC: 12 MMOL/L — SIGNIFICANT CHANGE UP (ref 5–17)
ANISOCYTOSIS BLD QL: SIGNIFICANT CHANGE UP
APTT BLD: 30.8 SEC — SIGNIFICANT CHANGE UP (ref 27.5–36.3)
AST SERPL-CCNC: 31 U/L — SIGNIFICANT CHANGE UP (ref 15–37)
BASOPHILS # BLD AUTO: 0 K/UL — SIGNIFICANT CHANGE UP (ref 0–0.2)
BASOPHILS # BLD AUTO: 0 K/UL — SIGNIFICANT CHANGE UP (ref 0–0.2)
BASOPHILS NFR BLD AUTO: 0 % — SIGNIFICANT CHANGE UP (ref 0–2)
BASOPHILS NFR BLD AUTO: 0 % — SIGNIFICANT CHANGE UP (ref 0–2)
BILIRUB SERPL-MCNC: 0.4 MG/DL — SIGNIFICANT CHANGE UP (ref 0.2–1.2)
BLD GP AB SCN SERPL QL: SIGNIFICANT CHANGE UP
BUN SERPL-MCNC: 120 MG/DL — HIGH (ref 7–23)
CALCIUM SERPL-MCNC: 8.2 MG/DL — LOW (ref 8.5–10.1)
CHLORIDE SERPL-SCNC: 114 MMOL/L — HIGH (ref 96–108)
CO2 SERPL-SCNC: 14 MMOL/L — LOW (ref 22–31)
CREAT SERPL-MCNC: 2.36 MG/DL — HIGH (ref 0.5–1.3)
DACRYOCYTES BLD QL SMEAR: SLIGHT — SIGNIFICANT CHANGE UP
ELLIPTOCYTES BLD QL SMEAR: SLIGHT — SIGNIFICANT CHANGE UP
EOSINOPHIL # BLD AUTO: 0 K/UL — SIGNIFICANT CHANGE UP (ref 0–0.5)
EOSINOPHIL # BLD AUTO: 0 K/UL — SIGNIFICANT CHANGE UP (ref 0–0.5)
EOSINOPHIL NFR BLD AUTO: 0 % — SIGNIFICANT CHANGE UP (ref 0–6)
EOSINOPHIL NFR BLD AUTO: 0 % — SIGNIFICANT CHANGE UP (ref 0–6)
GLUCOSE SERPL-MCNC: 129 MG/DL — HIGH (ref 70–99)
HCT VFR BLD CALC: 17.6 % — CRITICAL LOW (ref 39–50)
HCT VFR BLD CALC: 20 % — CRITICAL LOW (ref 39–50)
HGB BLD-MCNC: 5.5 G/DL — CRITICAL LOW (ref 13–17)
HGB BLD-MCNC: 6.5 G/DL — CRITICAL LOW (ref 13–17)
HYPOCHROMIA BLD QL: SIGNIFICANT CHANGE UP
IMM GRANULOCYTES NFR BLD AUTO: 0 % — SIGNIFICANT CHANGE UP (ref 0–1.5)
IMM GRANULOCYTES NFR BLD AUTO: 0 % — SIGNIFICANT CHANGE UP (ref 0–1.5)
INR BLD: 1.14 RATIO — SIGNIFICANT CHANGE UP (ref 0.88–1.16)
LACTATE SERPL-SCNC: 3.6 MMOL/L — HIGH (ref 0.7–2)
LACTATE SERPL-SCNC: 3.8 MMOL/L — HIGH (ref 0.7–2)
LYMPHOCYTES # BLD AUTO: 0.06 K/UL — LOW (ref 1–3.3)
LYMPHOCYTES # BLD AUTO: 0.11 K/UL — LOW (ref 1–3.3)
LYMPHOCYTES # BLD AUTO: 85.7 % — HIGH (ref 13–44)
LYMPHOCYTES # BLD AUTO: 91.7 % — HIGH (ref 13–44)
MANUAL SMEAR VERIFICATION: SIGNIFICANT CHANGE UP
MCHC RBC-ENTMCNC: 26.2 PG — LOW (ref 27–34)
MCHC RBC-ENTMCNC: 27.4 PG — SIGNIFICANT CHANGE UP (ref 27–34)
MCHC RBC-ENTMCNC: 31.3 GM/DL — LOW (ref 32–36)
MCHC RBC-ENTMCNC: 32.5 GM/DL — SIGNIFICANT CHANGE UP (ref 32–36)
MCV RBC AUTO: 83.8 FL — SIGNIFICANT CHANGE UP (ref 80–100)
MCV RBC AUTO: 84.4 FL — SIGNIFICANT CHANGE UP (ref 80–100)
MICROCYTES BLD QL: SIGNIFICANT CHANGE UP
MONOCYTES # BLD AUTO: 0 K/UL — SIGNIFICANT CHANGE UP (ref 0–0.9)
MONOCYTES # BLD AUTO: 0.01 K/UL — SIGNIFICANT CHANGE UP (ref 0–0.9)
MONOCYTES NFR BLD AUTO: 0 % — LOW (ref 2–14)
MONOCYTES NFR BLD AUTO: 8.3 % — SIGNIFICANT CHANGE UP (ref 2–14)
NEUTROPHILS # BLD AUTO: 0 K/UL — LOW (ref 1.8–7.4)
NEUTROPHILS # BLD AUTO: 0.01 K/UL — LOW (ref 1.8–7.4)
NEUTROPHILS NFR BLD AUTO: 0 % — LOW (ref 43–77)
NEUTROPHILS NFR BLD AUTO: 14.3 % — LOW (ref 43–77)
OVALOCYTES BLD QL SMEAR: SLIGHT — SIGNIFICANT CHANGE UP
PLAT MORPH BLD: SIGNIFICANT CHANGE UP
PLATELET # BLD AUTO: 2 K/UL — CRITICAL LOW (ref 150–400)
PLATELET # BLD AUTO: 37 K/UL — LOW (ref 150–400)
PLATELET COUNT - ESTIMATE: ABNORMAL
POIKILOCYTOSIS BLD QL AUTO: SIGNIFICANT CHANGE UP
POTASSIUM SERPL-MCNC: 5.1 MMOL/L — SIGNIFICANT CHANGE UP (ref 3.5–5.3)
POTASSIUM SERPL-SCNC: 5.1 MMOL/L — SIGNIFICANT CHANGE UP (ref 3.5–5.3)
PROT SERPL-MCNC: 5.9 GM/DL — LOW (ref 6–8.3)
PROTHROM AB SERPL-ACNC: 12.7 SEC — SIGNIFICANT CHANGE UP (ref 10–12.9)
RBC # BLD: 2.1 M/UL — LOW (ref 4.2–5.8)
RBC # BLD: 2.37 M/UL — LOW (ref 4.2–5.8)
RBC # FLD: 15.9 % — HIGH (ref 10.3–14.5)
RBC # FLD: 19.2 % — HIGH (ref 10.3–14.5)
RBC BLD AUTO: ABNORMAL
SODIUM SERPL-SCNC: 140 MMOL/L — SIGNIFICANT CHANGE UP (ref 135–145)
SPHEROCYTES BLD QL SMEAR: SLIGHT — SIGNIFICANT CHANGE UP
TROPONIN I SERPL-MCNC: 2.24 NG/ML — HIGH (ref 0.01–0.04)
TYPE + AB SCN PNL BLD: SIGNIFICANT CHANGE UP
WBC # BLD: 0.12 K/UL — CRITICAL LOW (ref 3.8–10.5)
WBC # BLD: <0.1 K/UL — CRITICAL LOW (ref 3.8–10.5)
WBC # FLD AUTO: 0.12 K/UL — CRITICAL LOW (ref 3.8–10.5)
WBC # FLD AUTO: <0.1 K/UL — CRITICAL LOW (ref 3.8–10.5)

## 2019-05-27 PROCEDURE — 71045 X-RAY EXAM CHEST 1 VIEW: CPT | Mod: 26

## 2019-05-27 PROCEDURE — 93010 ELECTROCARDIOGRAM REPORT: CPT

## 2019-05-27 PROCEDURE — 99285 EMERGENCY DEPT VISIT HI MDM: CPT

## 2019-05-27 RX ORDER — MORPHINE SULFATE 50 MG/1
4 CAPSULE, EXTENDED RELEASE ORAL ONCE
Refills: 0 | Status: DISCONTINUED | OUTPATIENT
Start: 2019-05-27 | End: 2019-05-27

## 2019-05-27 RX ORDER — ACETAMINOPHEN 500 MG
650 TABLET ORAL ONCE
Refills: 0 | Status: COMPLETED | OUTPATIENT
Start: 2019-05-27 | End: 2019-05-27

## 2019-05-27 RX ORDER — PANTOPRAZOLE SODIUM 20 MG/1
40 TABLET, DELAYED RELEASE ORAL ONCE
Refills: 0 | Status: COMPLETED | OUTPATIENT
Start: 2019-05-27 | End: 2019-05-27

## 2019-05-27 RX ORDER — SODIUM CHLORIDE 9 MG/ML
1750 INJECTION, SOLUTION INTRAVENOUS ONCE
Refills: 0 | Status: COMPLETED | OUTPATIENT
Start: 2019-05-27 | End: 2019-05-27

## 2019-05-27 RX ORDER — PANTOPRAZOLE SODIUM 20 MG/1
8 TABLET, DELAYED RELEASE ORAL
Qty: 80 | Refills: 0 | Status: DISCONTINUED | OUTPATIENT
Start: 2019-05-27 | End: 2019-05-28

## 2019-05-27 RX ADMIN — MORPHINE SULFATE 4 MILLIGRAM(S): 50 CAPSULE, EXTENDED RELEASE ORAL at 18:00

## 2019-05-27 RX ADMIN — Medication 650 MILLIGRAM(S): at 17:36

## 2019-05-27 RX ADMIN — SODIUM CHLORIDE 1750 MILLILITER(S): 9 INJECTION, SOLUTION INTRAVENOUS at 17:36

## 2019-05-27 RX ADMIN — Medication 650 MILLIGRAM(S): at 18:00

## 2019-05-27 RX ADMIN — PANTOPRAZOLE SODIUM 40 MILLIGRAM(S): 20 TABLET, DELAYED RELEASE ORAL at 18:36

## 2019-05-27 RX ADMIN — MORPHINE SULFATE 4 MILLIGRAM(S): 50 CAPSULE, EXTENDED RELEASE ORAL at 17:38

## 2019-05-27 RX ADMIN — PANTOPRAZOLE SODIUM 10 MG/HR: 20 TABLET, DELAYED RELEASE ORAL at 22:44

## 2019-05-27 NOTE — ED PROVIDER NOTE - SKIN, MLM
Skin dry and intact. No evidence of rash. +ecchymosis of different stages to entire body +petechiae to right dorsum of foot

## 2019-05-27 NOTE — ED PROVIDER NOTE - OBJECTIVE STATEMENT
89 y/o male with PMHx of myeloproliferative disease, DVT, polycythemia, HTN, s/p back surgery presents to the ED BIBEMS from nursing home c/o hematemesis, GI bleed. +diffuse abd pain. No other acute complaints at this time. Pt is DNR/DNI, MOLST form with paperwork.

## 2019-05-27 NOTE — ED PROVIDER NOTE - PMH
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

## 2019-05-27 NOTE — ED ADULT NURSE NOTE - NSIMPLEMENTINTERV_GEN_ALL_ED
Implemented All Fall with Harm Risk Interventions:  Vantage to call system. Call bell, personal items and telephone within reach. Instruct patient to call for assistance. Room bathroom lighting operational. Non-slip footwear when patient is off stretcher. Physically safe environment: no spills, clutter or unnecessary equipment. Stretcher in lowest position, wheels locked, appropriate side rails in place. Provide visual cue, wrist band, yellow gown, etc. Monitor gait and stability. Monitor for mental status changes and reorient to person, place, and time. Review medications for side effects contributing to fall risk. Reinforce activity limits and safety measures with patient and family. Provide visual clues: red socks.

## 2019-05-27 NOTE — PROGRESS NOTE ADULT - ASSESSMENT
Patient with gi bleeding likely secondary to low platelet count, and myeloproliferative disorder  while here BP since 6 pm has been ok.  no further bleeding,  likely from myoloproliferative disorder  received 2 units blood, and platelets,  I think bleeding has likely stopped after platelets, would transfuse as needed  ptrotonix drip  check f/u hgb. if stable after transfusion, kyrie given dnr, dni status stepdown would be appropriate  DNR, DNI confirmed with family by er staff  also acute renal failure likely from bleeding  prognosis is poor  inc lactate doubt sepsis likely from severe anemia

## 2019-05-27 NOTE — PROGRESS NOTE ADULT - SUBJECTIVE AND OBJECTIVE BOX
asked to see Patient by Dr. Richard    HPI:     This patient is a 89 year old male with  PMH significant for Myeloroleiferative disorder, cad s/p stent, hx. dvt has ivc not on anticoagulation     Patient was brought in for hematemesis, bp has been stable , no episodes of bleeding in ED.     Hgb 5.5, received 2 units of blood, platelet count 2, inc lactate, inc renal failure     Patient confused, petechiae     Patient is DNR, DNI       PMH:         Myeloproliferative disorder, platelet count 953 on 3/16     cad s/p stent     shoulder dislocation      IVC Filter     MEDICATIONS  novasc, asa,atorvastatin, colace    ROS cant obtain because patietn is confused         Height (cm): 165.1 (05-27 @ 17:19)  Weight (kg): 56.279640806 (05-27 @ 17:19)  BMI (kg/m2): 20.6 (05-27 @ 17:19)    ICU Vital Signs Last 24 Hrs  T(C): 36.9 (27 May 2019 21:29), Max: 38 (27 May 2019 17:30)  T(F): 98.4 (27 May 2019 21:29), Max: 100.4 (27 May 2019 17:30)  HR: 129 (27 May 2019 21:29) (96 - 147)  BP: 99/65 (27 May 2019 21:29) (57/41 - 131/105)  BP(mean): 84 (27 May 2019 19:02) (45 - 100)  ABP: --  ABP(mean): --  RR: 23 (27 May 2019 21:29) (20 - 35)  SpO2: 90% (27 May 2019 21:29) (77% - 100%)        Physical Exam:     General - weak, pale     HEENT - nc/at, mucous membranes dry     neck no jvd     lungs clear sat 100, on 100%, cxr is clear     cv irreg, irreg     abdomen soft     skin diffuse petechiae                             5.5    0.12  )-----------( 2        ( 27 May 2019 17:18 )             17.6       05-27    140  |  114<H>  |  120<H>  ----------------------------<  129<H>  5.1   |  14<L>  |  2.36<H>    Ca    8.2<L>      27 May 2019 17:18    TPro  5.9<L>  /  Alb  2.0<L>  /  TBili  0.4  /  DBili  x   /  AST  31  /  ALT  14  /  AlkPhos  63  05-27      CARDIAC MARKERS ( 27 May 2019 17:18 )  2.240 ng/mL / x     / x     / x     / x        DVT Prophylaxis:  low platelets                                                              Advanced Directives: DNR/DNI

## 2019-05-27 NOTE — ED PROVIDER NOTE - NS_ ATTENDINGSCRIBEDETAILS _ED_A_ED_FT
I, Robel Richard MD,  performed the initial face to face bedside interview with this patient regarding history of present illness, review of symptoms and relevant past medical, social and family history.  I completed an independent physical examination.    The history, relevant review of systems, past medical and surgical history, medical decision making, and physical examination was documented by the scribe in my presence and I attest to the accuracy of the documentation.

## 2019-05-27 NOTE — ED ADULT NURSE REASSESSMENT NOTE - NS ED NURSE REASSESS COMMENT FT1
1 unit PRBC infusing, pt tolerating infusion well. Per Dr. Euceda ok not to get 2nd set of blood cultures. unable to obtain urine sample, MD aware. safety and comfort maintained

## 2019-05-27 NOTE — ED PROVIDER NOTE - ENMT, MLM
Airway patent, Nasal mucosa clear. Mouth with normal mucosa. Throat has no vesicles, no oropharyngeal exudates and uvula is midline. +dry blood to oropharynx

## 2019-05-27 NOTE — ED PROVIDER NOTE - PROGRESS NOTE DETAILS
Deann MAJOR for ED attending, Dr. Richard: Stool guaiac performed by Dr. Richard. Lot #: [id number]; Expiration: 12/31/19; Result: positive; QC- reactive. Deann MAJOR for ED attending, Dr. Richard: Spoke with Dr. Chava MCKEON who states continue current care. Call out to ICU for evaluation. No need for imaging studies. Will see pt in the morning. Deann MAJOR for ED attending, Dr. Richard: Spoke with ICU Dr. Frederick who wants an ABG for the pt. Clarified with pt health care proxy Wendie who states pt does not want hospice or palliative care. If endoscopy or colonoscopy needed in future, will consent to such treatment. Reiterates does not want intubation or resuscitation. Deann MAJOR for ED attending, Dr. Richard: Spoke with ICU Dr. Hayes who wants an ABG for the pt. Clarified with pt health care proxy Wendie who states pt does not want hospice or palliative care. If endoscopy or colonoscopy needed in future, will consent to such treatment. Reiterates does not want intubation or resuscitation. pt seen by icu will go to step down endorsed to Dr. Pichardo

## 2019-05-28 VITALS — TEMPERATURE: 98 F

## 2019-05-28 DIAGNOSIS — Z98.890 OTHER SPECIFIED POSTPROCEDURAL STATES: Chronic | ICD-10-CM

## 2019-05-28 LAB
ALBUMIN SERPL ELPH-MCNC: 2 G/DL — LOW (ref 3.3–5)
ALP SERPL-CCNC: 59 U/L — SIGNIFICANT CHANGE UP (ref 40–120)
ALT FLD-CCNC: 15 U/L — SIGNIFICANT CHANGE UP (ref 12–78)
ANION GAP SERPL CALC-SCNC: 12 MMOL/L — SIGNIFICANT CHANGE UP (ref 5–17)
ANISOCYTOSIS BLD QL: SLIGHT — SIGNIFICANT CHANGE UP
APPEARANCE UR: CLEAR — SIGNIFICANT CHANGE UP
AST SERPL-CCNC: 26 U/L — SIGNIFICANT CHANGE UP (ref 15–37)
BACTERIA # UR AUTO: ABNORMAL
BASOPHILS # BLD AUTO: 0 K/UL — SIGNIFICANT CHANGE UP (ref 0–0.2)
BASOPHILS NFR BLD AUTO: 0 % — SIGNIFICANT CHANGE UP (ref 0–2)
BILIRUB SERPL-MCNC: 1 MG/DL — SIGNIFICANT CHANGE UP (ref 0.2–1.2)
BILIRUB UR-MCNC: NEGATIVE — SIGNIFICANT CHANGE UP
BUN SERPL-MCNC: 107 MG/DL — HIGH (ref 7–23)
CALCIUM SERPL-MCNC: 7.8 MG/DL — LOW (ref 8.5–10.1)
CHLORIDE SERPL-SCNC: 118 MMOL/L — HIGH (ref 96–108)
CO2 SERPL-SCNC: 16 MMOL/L — LOW (ref 22–31)
COLOR SPEC: YELLOW — SIGNIFICANT CHANGE UP
CREAT SERPL-MCNC: 2.04 MG/DL — HIGH (ref 0.5–1.3)
DACRYOCYTES BLD QL SMEAR: SLIGHT — SIGNIFICANT CHANGE UP
DIFF PNL FLD: ABNORMAL
E COLI DNA BLD POS QL NAA+NON-PROBE: SIGNIFICANT CHANGE UP
EOSINOPHIL # BLD AUTO: 0 K/UL — SIGNIFICANT CHANGE UP (ref 0–0.5)
EOSINOPHIL NFR BLD AUTO: 0 % — SIGNIFICANT CHANGE UP (ref 0–6)
EPI CELLS # UR: SIGNIFICANT CHANGE UP
GLUCOSE SERPL-MCNC: 99 MG/DL — SIGNIFICANT CHANGE UP (ref 70–99)
GLUCOSE UR QL: NEGATIVE MG/DL — SIGNIFICANT CHANGE UP
GRAM STN FLD: SIGNIFICANT CHANGE UP
GRAM STN FLD: SIGNIFICANT CHANGE UP
HCT VFR BLD CALC: 25.3 % — LOW (ref 39–50)
HCT VFR BLD CALC: 27.2 % — LOW (ref 39–50)
HGB BLD-MCNC: 8.7 G/DL — LOW (ref 13–17)
HGB BLD-MCNC: 9.1 G/DL — LOW (ref 13–17)
KETONES UR-MCNC: NEGATIVE — SIGNIFICANT CHANGE UP
LACTATE SERPL-SCNC: 2.5 MMOL/L — HIGH (ref 0.7–2)
LEUKOCYTE ESTERASE UR-ACNC: NEGATIVE — SIGNIFICANT CHANGE UP
LYMPHOCYTES # BLD AUTO: 0.06 K/UL — LOW (ref 1–3.3)
LYMPHOCYTES # BLD AUTO: 92 % — HIGH (ref 13–44)
MACROCYTES BLD QL: SLIGHT — SIGNIFICANT CHANGE UP
MAGNESIUM SERPL-MCNC: 2 MG/DL — SIGNIFICANT CHANGE UP (ref 1.6–2.6)
MANUAL SMEAR VERIFICATION: SIGNIFICANT CHANGE UP
MCHC RBC-ENTMCNC: 28.2 PG — SIGNIFICANT CHANGE UP (ref 27–34)
MCHC RBC-ENTMCNC: 28.5 PG — SIGNIFICANT CHANGE UP (ref 27–34)
MCHC RBC-ENTMCNC: 33.5 GM/DL — SIGNIFICANT CHANGE UP (ref 32–36)
MCHC RBC-ENTMCNC: 34.4 GM/DL — SIGNIFICANT CHANGE UP (ref 32–36)
MCV RBC AUTO: 83 FL — SIGNIFICANT CHANGE UP (ref 80–100)
MCV RBC AUTO: 84.2 FL — SIGNIFICANT CHANGE UP (ref 80–100)
METHOD TYPE: SIGNIFICANT CHANGE UP
MICROCYTES BLD QL: SLIGHT — SIGNIFICANT CHANGE UP
MONOCYTES # BLD AUTO: 0 K/UL — SIGNIFICANT CHANGE UP (ref 0–0.9)
MONOCYTES NFR BLD AUTO: 4 % — SIGNIFICANT CHANGE UP (ref 2–14)
NEUTROPHILS # BLD AUTO: 0 K/UL — LOW (ref 1.8–7.4)
NEUTROPHILS NFR BLD AUTO: 4 % — LOW (ref 43–77)
NITRITE UR-MCNC: NEGATIVE — SIGNIFICANT CHANGE UP
NRBC # BLD: 0 /100 — SIGNIFICANT CHANGE UP (ref 0–0)
NRBC # BLD: SIGNIFICANT CHANGE UP /100 WBCS (ref 0–0)
OVALOCYTES BLD QL SMEAR: SLIGHT — SIGNIFICANT CHANGE UP
PH UR: 5 — SIGNIFICANT CHANGE UP (ref 5–8)
PLAT MORPH BLD: NORMAL — SIGNIFICANT CHANGE UP
PLATELET # BLD AUTO: 20 K/UL — CRITICAL LOW (ref 150–400)
PLATELET # BLD AUTO: 23 K/UL — LOW (ref 150–400)
POIKILOCYTOSIS BLD QL AUTO: SLIGHT — SIGNIFICANT CHANGE UP
POTASSIUM SERPL-MCNC: 3.8 MMOL/L — SIGNIFICANT CHANGE UP (ref 3.5–5.3)
POTASSIUM SERPL-SCNC: 3.8 MMOL/L — SIGNIFICANT CHANGE UP (ref 3.5–5.3)
PROT SERPL-MCNC: 5.2 GM/DL — LOW (ref 6–8.3)
PROT UR-MCNC: 30 MG/DL
RBC # BLD: 3.05 M/UL — LOW (ref 4.2–5.8)
RBC # BLD: 3.23 M/UL — LOW (ref 4.2–5.8)
RBC # FLD: 15.2 % — HIGH (ref 10.3–14.5)
RBC # FLD: 15.3 % — HIGH (ref 10.3–14.5)
RBC BLD AUTO: ABNORMAL
RBC CASTS # UR COMP ASSIST: ABNORMAL /HPF (ref 0–4)
SCHISTOCYTES BLD QL AUTO: SLIGHT — SIGNIFICANT CHANGE UP
SODIUM SERPL-SCNC: 146 MMOL/L — HIGH (ref 135–145)
SP GR SPEC: 1.01 — SIGNIFICANT CHANGE UP (ref 1.01–1.02)
SPECIMEN SOURCE: SIGNIFICANT CHANGE UP
SPHEROCYTES BLD QL SMEAR: SLIGHT — SIGNIFICANT CHANGE UP
TROPONIN I SERPL-MCNC: 1.13 NG/ML — HIGH (ref 0.01–0.04)
UROBILINOGEN FLD QL: NEGATIVE MG/DL — SIGNIFICANT CHANGE UP
WBC # BLD: <0.1 K/UL — CRITICAL LOW (ref 3.8–10.5)
WBC # BLD: <0.1 K/UL — CRITICAL LOW (ref 3.8–10.5)
WBC # FLD AUTO: <0.1 K/UL — CRITICAL LOW (ref 3.8–10.5)
WBC # FLD AUTO: <0.1 K/UL — CRITICAL LOW (ref 3.8–10.5)
WBC UR QL: SIGNIFICANT CHANGE UP

## 2019-05-28 PROCEDURE — 99223 1ST HOSP IP/OBS HIGH 75: CPT

## 2019-05-28 PROCEDURE — 99233 SBSQ HOSP IP/OBS HIGH 50: CPT

## 2019-05-28 PROCEDURE — 71045 X-RAY EXAM CHEST 1 VIEW: CPT | Mod: 26

## 2019-05-28 RX ORDER — ROBINUL 0.2 MG/ML
0.2 INJECTION INTRAMUSCULAR; INTRAVENOUS EVERY 6 HOURS
Refills: 0 | Status: DISCONTINUED | OUTPATIENT
Start: 2019-05-28 | End: 2019-05-28

## 2019-05-28 RX ORDER — SODIUM CHLORIDE 9 MG/ML
500 INJECTION INTRAMUSCULAR; INTRAVENOUS; SUBCUTANEOUS ONCE
Refills: 0 | Status: COMPLETED | OUTPATIENT
Start: 2019-05-28 | End: 2019-05-28

## 2019-05-28 RX ORDER — LANOLIN ALCOHOL/MO/W.PET/CERES
1 CREAM (GRAM) TOPICAL
Qty: 0 | Refills: 0 | DISCHARGE

## 2019-05-28 RX ORDER — CEFEPIME 1 G/1
2000 INJECTION, POWDER, FOR SOLUTION INTRAMUSCULAR; INTRAVENOUS EVERY 24 HOURS
Refills: 0 | Status: DISCONTINUED | OUTPATIENT
Start: 2019-05-28 | End: 2019-05-28

## 2019-05-28 RX ORDER — HYDROXYUREA 500 MG/1
3 CAPSULE ORAL
Qty: 0 | Refills: 0 | DISCHARGE

## 2019-05-28 RX ORDER — ACETAMINOPHEN 500 MG
1000 TABLET ORAL ONCE
Refills: 0 | Status: COMPLETED | OUTPATIENT
Start: 2019-05-28 | End: 2019-05-28

## 2019-05-28 RX ORDER — ONDANSETRON 8 MG/1
4 TABLET, FILM COATED ORAL EVERY 6 HOURS
Refills: 0 | Status: DISCONTINUED | OUTPATIENT
Start: 2019-05-28 | End: 2019-05-28

## 2019-05-28 RX ORDER — ACETAMINOPHEN 500 MG
120 TABLET ORAL EVERY 4 HOURS
Refills: 0 | Status: DISCONTINUED | OUTPATIENT
Start: 2019-05-28 | End: 2019-05-28

## 2019-05-28 RX ORDER — CEFEPIME 1 G/1
2000 INJECTION, POWDER, FOR SOLUTION INTRAMUSCULAR; INTRAVENOUS EVERY 8 HOURS
Refills: 0 | Status: DISCONTINUED | OUTPATIENT
Start: 2019-05-28 | End: 2019-05-28

## 2019-05-28 RX ORDER — FILGRASTIM 480MCG/1.6
300 VIAL (ML) INJECTION DAILY
Refills: 0 | Status: DISCONTINUED | OUTPATIENT
Start: 2019-05-28 | End: 2019-05-28

## 2019-05-28 RX ORDER — MORPHINE SULFATE 50 MG/1
2 CAPSULE, EXTENDED RELEASE ORAL
Refills: 0 | Status: DISCONTINUED | OUTPATIENT
Start: 2019-05-28 | End: 2019-05-28

## 2019-05-28 RX ADMIN — SODIUM CHLORIDE 500 MILLILITER(S): 9 INJECTION INTRAMUSCULAR; INTRAVENOUS; SUBCUTANEOUS at 10:52

## 2019-05-28 RX ADMIN — Medication 300 MICROGRAM(S): at 13:15

## 2019-05-28 RX ADMIN — MORPHINE SULFATE 2 MILLIGRAM(S): 50 CAPSULE, EXTENDED RELEASE ORAL at 16:52

## 2019-05-28 RX ADMIN — Medication 400 MILLIGRAM(S): at 06:58

## 2019-05-28 RX ADMIN — Medication 1000 MILLIGRAM(S): at 07:20

## 2019-05-28 RX ADMIN — CEFEPIME 100 MILLIGRAM(S): 1 INJECTION, POWDER, FOR SOLUTION INTRAMUSCULAR; INTRAVENOUS at 13:15

## 2019-05-28 NOTE — PROGRESS NOTE ADULT - SUBJECTIVE AND OBJECTIVE BOX
CC/reason for follow up: sepsis    S: patient was seen 3 times today and discussed w/ pt's niece who is at the bedside. pt's condition has been deteriorating rapidly since this morning. patient is hypoxic and hypotensive, not responding to IV fluids. he dislikes the oxygen mask and keeps pulling it off. he is asking for ice cream.     ROS: unable to obtain    T(C): 36.1 (05-28-19 @ 09:06), Max: 38 (05-27-19 @ 17:30)  HR: 91 (05-28-19 @ 15:00) (91 - 147)  BP: 82/43 (05-28-19 @ 15:00) (57/41 - 131/105)  RR: 25 (05-28-19 @ 15:00) (17 - 35)  SpO2: 88% (05-28-19 @ 13:00) (77% - 100%)    Gen - Pleasant, cooperative, in acute distress  HEENT- PERRL, dry mucus membranes, OP clear  CV - RRR, No r/g, +pulses, mild b/l LE edema, +murmur  Lungs - Good effort, Coarse BS bilaterally  Abdomen - Soft, nontender, nondistended, +BS, No rebound/rigidity/guarding  Ext - No cyanosis/clubbing.   Skin - ecchymosis on ext, No jaundice  Psych- Alert & oriented x 3, waxing and waning  Neuro- moves all ext. no facial droop        Culture - Blood (collected 05-27-19 @ 18:18)  Source: .Blood None  Gram Stain (05-28-19 @ 08:51):    Growth in aerobic and anaerobic bottles: Gram Negative Rods  Preliminary Report (05-28-19 @ 08:52):    Growth in aerobic and anaerobic bottles: Gram Negative Rods    "Due to technical problems, Proteus sp. will Not be reported as part of    the BCID panel until further notice"    ***Blood Panel PCR results on this specimen are available    approximately 3 hours after the Gram stain result.***    Gram stain, PCR, and/or culture results may not always    correspond due to difference in methodologies.    ************************************************************    This PCR assay was performed using [facility].    The following targets are tested for: Enterococcus,    vancomycin resistant enterococci, Listeria monocytogenes,    coagulase negative staphylococci, S. aureus,    methicillin resistant S. aureus, Streptococcus agalactiae    (Group B), S. pneumoniae, S. pyogenes (Group A),    Acinetobacter baumannii, Enterobacter cloacae, E. coli,    Klebsiella oxytoca, K. pneumoniae, Proteus sp.,    Serratia marcescens, Haemophilus influenzae,    Neisseria meningitidis, Pseudomonas aeruginosa, Candida    albicans, C. glabrata, C krusei, C parapsilosis,    C. tropicalis and the KPC resistance gene.  Organism: Blood Culture PCR (05-28-19 @ 09:39)  Organism: Blood Culture PCR (05-28-19 @ 09:39)      -  Escherichia coli: Detec      Method Type: PCR        Diagnostic studies: personally reviewed  *    Assessment and Plan:  1.	Ecoli sepsis/ sever sepsis  2.	acute respiratory failure w/ hypoxia  3.	JOSE, likely ATN due to hypotension  4.	hypotension  5.	pancytopenia  6.	bacteremia  7.	hematemesis  8.	myeloproliferative disorder    plan:   d/w pt's niece who is at the bedside and she is the proxy for the pt's sister. she says that the family agrees with comfort care measures and hospice and that they don't want any procedures. currently patient is too unstable for transfer to outside facility or inpatient hospice facility but if stabilizes then could consider that tomorrow. pt remains DNR/DNI, no vasopressors and family agrees with comfort care measures in the hospital. discussed w/ Dr. Lau (oncology) who also agrees with hospice or comfort care measures. plan on initiating comfort care measures and transferring patient to medical floor. we will be stopping all other meds and stopping tele/pulse ox. pt has MOLST form in the chart.   start morphine iv prn  tylenol prn  stop blood draws    Code status: DNR/DNI, comfort care  Dispo: medical floor  ELOS: tbd    All questions/concerns were discussed with patient/family by bedside.    Case d/w pt's nurse, nursing manager and social work.     Total time spent: 45min. More than 50% of time was spent in counseling, discussion of medications, and coordination of care  prolonged service CC/reason for follow up: sepsis    S: patient was seen 3 times today and discussed w/ pt's niece who is at the bedside. pt's condition has been deteriorating rapidly since this morning. patient is hypoxic and hypotensive, not responding to IV fluids. he dislikes the oxygen mask and keeps pulling it off. he is asking for ice cream.     ROS: unable to obtain    T(C): 36.1 (05-28-19 @ 09:06), Max: 38 (05-27-19 @ 17:30)  HR: 91 (05-28-19 @ 15:00) (91 - 147)  BP: 82/43 (05-28-19 @ 15:00) (57/41 - 131/105)  RR: 25 (05-28-19 @ 15:00) (17 - 35)  SpO2: 88% (05-28-19 @ 13:00) (77% - 100%)    Gen - Pleasant, cooperative, in acute distress  HEENT- PERRL, dry mucus membranes, OP clear  CV - RRR, No r/g, +pulses, mild b/l LE edema, +murmur  Lungs - Good effort, Coarse BS bilaterally  Abdomen - Soft, nontender, nondistended, +BS, No rebound/rigidity/guarding  Ext - No cyanosis/clubbing.   Skin - ecchymosis on ext, No jaundice  Psych- Alert & oriented x 3, waxing and waning  Neuro- moves all ext. no facial droop        Culture - Blood (collected 05-27-19 @ 18:18)  Source: .Blood None  Gram Stain (05-28-19 @ 08:51):    Growth in aerobic and anaerobic bottles: Gram Negative Rods  Preliminary Report (05-28-19 @ 08:52):    Growth in aerobic and anaerobic bottles: Gram Negative Rods    "Due to technical problems, Proteus sp. will Not be reported as part of    the BCID panel until further notice"    ***Blood Panel PCR results on this specimen are available    approximately 3 hours after the Gram stain result.***    Gram stain, PCR, and/or culture results may not always    correspond due to difference in methodologies.    ************************************************************    This PCR assay was performed using [facility].    The following targets are tested for: Enterococcus,    vancomycin resistant enterococci, Listeria monocytogenes,    coagulase negative staphylococci, S. aureus,    methicillin resistant S. aureus, Streptococcus agalactiae    (Group B), S. pneumoniae, S. pyogenes (Group A),    Acinetobacter baumannii, Enterobacter cloacae, E. coli,    Klebsiella oxytoca, K. pneumoniae, Proteus sp.,    Serratia marcescens, Haemophilus influenzae,    Neisseria meningitidis, Pseudomonas aeruginosa, Candida    albicans, C. glabrata, C krusei, C parapsilosis,    C. tropicalis and the KPC resistance gene.  Organism: Blood Culture PCR (05-28-19 @ 09:39)  Organism: Blood Culture PCR (05-28-19 @ 09:39)      -  Escherichia coli: Detec      Method Type: PCR        Diagnostic studies: personally reviewed  *    Assessment and Plan:  1.	Ecoli sepsis/ sever sepsis  2.	acute respiratory failure w/ hypoxia  3.	JOSE, likely ATN due to hypotension  4.	hypotension  5.	pancytopenia  6.	bacteremia  7.	hematemesis, gi bleed  8.	myeloproliferative disorder    plan:   ·	consulted ID this morning for bacteremia and started cefepime.   ·	pt's condition has been deteriorating   ·	d/w pt's niece who is at the bedside and she is the proxy for the pt's sister. she says that the family agrees with comfort care measures and hospice and that they don't want any procedures. currently patient is too unstable for transfer to outside facility or inpatient hospice facility but if stabilizes then could consider that tomorrow. pt remains DNR/DNI, no vasopressors and family agrees with comfort care measures in the hospital. discussed w/ Dr. Lau (oncology) who also agrees with hospice or comfort care measures. plan on initiating comfort care measures and transferring patient to medical floor. we will be stopping all other meds and stopping tele/pulse ox. pt has MOLST form in the chart.   ·	start morphine iv prn  tylenol prn  stop blood draws    Code status: DNR/DNI, comfort care  Dispo: medical floor  ELOS: tbd    All questions/concerns were discussed with patient/family by bedside.    Case d/w pt's nurse, nursing manager and social work.     Total time spent: 45min. More than 50% of time was spent in counseling, discussion of medications, and coordination of care  prolonged service

## 2019-05-28 NOTE — H&P ADULT - NSHPPHYSICALEXAM_GEN_ALL_CORE
ICU Vital Signs Last 24 Hrs  T(C): 36.6 (28 May 2019 00:18), Max: 38 (27 May 2019 17:30)  T(F): 97.8 (28 May 2019 00:18), Max: 100.4 (27 May 2019 17:30)  HR: 131 (28 May 2019 01:08) (96 - 147)  BP: 80/53 (28 May 2019 01:08) (57/41 - 131/105)  BP(mean): 84 (27 May 2019 19:02) (45 - 100)  RR: 20 (28 May 2019 01:08) (20 - 35)  SpO2: 99% (28 May 2019 01:08) (77% - 100%)

## 2019-05-28 NOTE — H&P ADULT - NSHPOUTPATIENTPROVIDERS_GEN_ALL_CORE
PCP; Dr. Johan Mcnair PCP; Dr. Johan Mcnair  Hematology/Oncology; Dr. Lau  Cardiology; Dr. Hermosillo

## 2019-05-28 NOTE — H&P ADULT - HISTORY OF PRESENT ILLNESS
89 y/o M PMHx significant for myeloproliferative disorder/ P. vera on Hydroxyurea, CAD s/p stent, Hypertension, Hyperlipidemia, chronic DVT R iliac vein (not on A/C), hx GI bleeding, s/p IVC filter, OA, moderate aortic stenosis, suspected TIA (2/2019), and RBBB was BIBA from [place] for further evaluation and management of hematemesis. Vital signs in triage => BP 65/49, /min, SpO2 77%. Labs => WBC 0.012 -> <0.10, HgbHct -> 5.5/17.6 -> 6.5/20, PLT 2 -> 37, LA 3.8 -> 3.6 -> 2.5, HCO3 14, BUN/Cr 120/2.36, TPro 5.9, Alb 2.0, TnI 2.240. In the ED CCM and GI were consulted. The patient was transfused 3 units pRBCs (4 units ordered), and PLTs x 2. AS per ED documentation the patient's MOLST form was confirmed w/ the patient's family. Of note, there were no notable episodes of gross hematemesis in the ED. The patient was started on a PPI gtt per protocol.

## 2019-05-28 NOTE — DISCHARGE NOTE FOR THE EXPIRED PATIENT - HOSPITAL COURSE
Notified by RN that patient has . On exam the patient did not respond to verbal or physical stimuli. Absent heart and breath sounds. Absent peripheral pulses. Pupils are fixed and dilated. Patient pronounced dead at 19:52. Hospitalist will be notified. Next of kin/family notified as above.

## 2019-05-28 NOTE — H&P ADULT - NSICDXFAMILYHX_GEN_ALL_CORE_FT
FAMILY HISTORY:  No pertinent family history in first degree relatives FAMILY HISTORY:  Mother  Still living? Unknown  FHx: hypertension, Age at diagnosis: Age Unknown

## 2019-05-28 NOTE — ED ADULT NURSE REASSESSMENT NOTE - NS ED NURSE REASSESS COMMENT FT1
Spoke to Dr. Pichardo regarding pts vital signs, as per Dr. Pichardo, he will be up to evaluate pt shortly. Pt awake, urinal provided and urine sample obtained, sent to lab. Protonix drip in progress, tolerating well. safety and comfort maintained

## 2019-05-28 NOTE — PROVIDER CONTACT NOTE (OTHER) - ACTION/TREATMENT ORDERED:
MD Keene called and made aware. Death pronounced at 19:52 pm. Pt`s HCP Wendie Rivera notified. IV removed and pt cleaned. Supervision made aware. Body sent to [facility] at 10:45 pm after post mortem.

## 2019-05-28 NOTE — H&P ADULT - ASSESSMENT
89 y/o M PMHx significant for myeloproliferative disorder/ P. 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 Vital signs in triage => BP 65/49, /min, SpO2 77%. Labs => WBC 0.012 -> <0.10, HgbHct -> 5.5/17.6 -> 6.5/20, PLT 2 -> 37, LA 3.8 -> 3.6 -> 2.5, HCO3 14, BUN/Cr 120/2.36, TPro 5.9, Alb 2.0, TnI 2.240. In the ED CCM and GI were consulted. The patient was transfused 3 units pRBCs (4 units ordered), and PLTs x 2. AS per ED documentation the patient's MOLST form was confirmed w/ the patient's family. Of note, there were no notable episodes of gross hematemesis in the ED. The patient was started on a PPI gtt per protocol. 89 y/o M PMHx significant for myeloproliferative disorder/ P. vera on Hydroxyurea, CAD s/p stent, Hypertension, Hyperlipidemia, chronic DVT R iliac vein (not on A/C), hx GI bleeding, s/p IVC filter, OA, moderate aortic stenosis, suspected TIA (2/2019), and RBBB was BIBA from [place] for further evaluation and management of hematemesis. Vital signs in triage => BP 65/49, /min, SpO2 77%. Labs => WBC 0.012 -> <0.10, HgbHct -> 5.5/17.6 -> 6.5/20, PLT 2 -> 37, LA 3.8 -> 3.6 -> 2.5, HCO3 14, BUN/Cr 120/2.36, TPro 5.9, Alb 2.0, TnI 2.240. In the ED CCM and GI were consulted. The patient was transfused 3 units pRBCs (4 units ordered), and PLTs x 2. AS per ED documentation the patient's MOLST form was confirmed w/ the patient's family. Of note, there were no notable episodes of gross hematemesis in the ED. The patient was started on a PPI gtt per protocol.    #GI Bleed  ~admit to SDU  ~serial CBCs  ~transfuse prn  ~NPO  ~f/u w/ GI consultation in the am  ~cont. PPI gtt per protocol  ~CCM consultation appreciated    #Elevated Lactic acid  ~likely component of acute volume loss/hypotension; less likely infectious/sepsis component as no signs of active infection noted at this time  ~LA improving as noted above     #Myeloproliferative disorder/ P. vera  ~patient received 3 units pRBCs + PLTs x 2 thus far  ~f/u w/ Hematology/Oncology further in the am  ~note; patient normally scheduled to take Hydoxyurea 1000mg po daily on Monday, Tuesday, Wednesday, Thursday, Sunday, and 1500mg po daily on Friday and Saturday    #Elevated Troponins  ~DDx includes ACS due to overwhelming GI bleeding vs Demand ischemia  ~trend Robert/EKGs  ~f/u w/ Cardiology in the am  ~due to GI bleeding will refrain from administering ASA    #Hypertension  ~takes Amlodipine 10mg po daily due to GI bleeding will refrain from administering)    #Advance Directives  ~as noted in ED documentation, patient's Niece - Mable Rivera, and patient's MOLST form the patient is DNR/DNI. MOLST form completed/confirmed accordingly.    #Vte ppx  ~IMPROVE VTE Risk Score is 5  [X] Previous VTE                                                  3  [  ] Thrombophilia                                               2  [  ] Lower limb paralysis                                      2        (unable to hold up >15 seconds)    [  ] Current Cancer                                              2         (within 6 months)  [X] Immobilization > 24 hrs                                1  [  ] ICU/CCU stay > 24 hours                              1  [X] Age > 60                                                      1  ~due to active GI bleeding will recommend SCDs for now

## 2019-05-28 NOTE — CONSULT NOTE ADULT - CONSULT REASON
GI bleed
gnr sepsis/bacteremia/pancytopenia/hx of myeloproliferative do
P vera now with pancytopenia

## 2019-05-28 NOTE — H&P ADULT - NSICDXPASTSURGICALHX_GEN_ALL_CORE_FT
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

## 2019-05-28 NOTE — CONSULT NOTE ADULT - SUBJECTIVE AND OBJECTIVE BOX
HPI:  89 y/o M PMHx significant for myeloproliferative disorder/ P. vera on Hydroxyurea, CAD s/p stent, Hypertension, Hyperlipidemia, chronic DVT R iliac vein (not on A/C), hx GI bleeding, s/p IVC filter, OA, moderate aortic stenosis, suspected TIA (2/2019), and RBBB was BIBA from [place] for further evaluation and management of hematemesis. Vital signs in triage => BP 65/49, /min, SpO2 77%. Labs => WBC 0.012 -> <0.10, HgbHct -> 5.5/17.6 -> 6.5/20, PLT 2 -> 37, LA 3.8 -> 3.6 -> 2.5, HCO3 14, BUN/Cr 120/2.36, TPro 5.9, Alb 2.0, TnI 2.240. In the ED CCM and GI were consulted. The patient was transfused 3 units pRBCs (4 units ordered), and PLTs x 2. AS per ED documentation the patient's MOLST form was confirmed w/ the patient's family. Of note, there were no notable episodes of gross hematemesis in the ED. The patient was started on a PPI gtt per protocol. (28 May 2019 03:25)    Admitted to ICU step down.   Transfused  3 units PRBC and  2 units platelets. No hematemesis since admission.     Patient is well known to me. Diagnosed with Polycythemia vera > 30 yrs ago. Currently on Hydroxyurea 16 table per week ( 1500 mg Fri and Sat and 1000 mg daily Sun- Thursdays).  Last seen in our office 5/13/19 : WBC was 11.1  Hgb 11.6  plts 663K  On ASA 81 mg.  History of recurrent GI bleeding - several episodes over course of few years. Hx of lower extremity DVT Jan 2018 - off AC due to GI bleeding, s/p IVC filter.  Hospitalized March 2019 s/p fall with shoulder dislocation and discharged to [facility].      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    ROS: limited due to patient' s condition.  Denies abdominal pain. C/o back pain/ neck pain ( chronic ). Difficulty walking/ balance problems due to spine disease ( chronic)_ he was using walker at [facility].    Vital Signs Last 24 Hrs  T(C): 36.2 (28 May 2019 05:32), Max: 38 (27 May 2019 17:30)  T(F): 97.1 (28 May 2019 05:32), Max: 100.4 (27 May 2019 17:30)  HR: 92 (28 May 2019 08:00) (92 - 147)  BP: 92/38 (28 May 2019 08:00) (57/41 - 131/105)  BP(mean): 50 (28 May 2019 08:00) (45 - 100)  RR: 17 (28 May 2019 08:00) (17 - 35)  SpO2: 100% (28 May 2019 08:00) (77% - 100%)    Awake alert answers some questions but appears confused at times   Dry oral mucosa. Skin- multiple ecchymoses.  Lungs clear. Heart RRR  with murmur.  Abdomen soft, ? epigastric tenderness No leg edema.  Neuro : moving all extremities.    Complete Blood Count + Automated Diff (05.27.19 @ 17:18)    WBC Count: 0.12:   Hemoglobin: 5.5 g/dL    Hematocrit: 17.6 %    Mean Cell Volume: 83.8 fl    Mean Cell Hemoglobin: 26.2 pg    Mean Cell Hemoglobin Conc: 31.3 gm/dL    Red Cell Distrib Width: 19.2 %    Platelet Count - Automated: 2 K/uL    Auto Neutrophil #: 0.00 K/uL    Auto Lymphocyte #: 0.11 K/uL    Auto Monocyte #: 0.01 K/uL    Auto Eosinophil #: 0.00 K/uL    Auto Basophil #: 0.00 K/uL    Auto Neutrophil %: 0.0: Differential percentages must be correlated with absolute numbers for  clinical significance. %    Auto Lymphocyte %: 91.7 %    Auto Monocyte %: 8.3 %    Auto Eosinophil %: 0.0 %    Auto Basophil %: 0.0 %    Auto Immature Granulocyte %: 0.0 %                          9.1    <0.10 )-----------( 23       ( 28 May 2019 06:49 )             27.2   05-28    146<H>  |  118<H>  |  107<H>  ----------------------------<  99  3.8   |  16<L>  |  2.04<H>    Ca    7.8<L>      28 May 2019 06:49  Mg     2.0     05-28    TPro  5.2<L>  /  Alb  2.0<L>  /  TBili  1.0  /  DBili  x   /  AST  26  /  ALT  15  /  AlkPhos  59  05-28    MEDICATIONS  (STANDING):  cefepime   IVPB 2000 milliGRAM(s) IV Intermittent every 8 hours  pantoprazole Infusion 8 mG/Hr (10 mL/Hr) IV Continuous <Continuous>    MEDICATIONS  (PRN):  ondansetron Injectable 4 milliGRAM(s) IV Push every 6 hours PRN Nausea
Patient is a 89y old  Male who presents with a chief complaint of Hematemesis (28 May 2019 03:25)      HPI:  89 y/o M PMHx significant for myeloproliferative disorder/ P. vera on Hydroxyurea, CAD s/p stent, Hypertension, Hyperlipidemia, chronic DVT R iliac vein (not on A/C), hx GI bleeding, s/p IVC filter, OA, moderate aortic stenosis, suspected TIA (2/2019), and RBBB was BIBA from [place] for further evaluation and management of hematemesis. Vital signs in triage => BP 65/49, /min, SpO2 77%. Labs => WBC 0.012 -> <0.10, HgbHct -> 5.5/17.6 -> 6.5/20, PLT 2 -> 37, LA 3.8 -> 3.6 -> 2.5, HCO3 14, BUN/Cr 120/2.36, TPro 5.9, Alb 2.0, TnI 2.240. The patient was transfused 3 units pRBCs (4 units ordered), and PLTs x 2. AS per ED documentation the patient's MOLST form was confirmed w/ the patient's family. The patient has not had significant bleeding in last several hours.  This AM, he notes dry throat and nose with some discomfort in his upper abdomen.  He is answering questions appropriately for the most part but is difficult to understand and seems confused at times as well.    PAST MEDICAL & SURGICAL HISTORY:  HLD (hyperlipidemia)  Polycythemia vera  Myeloproliferative disease  Chronic deep vein thrombosis (DVT) of right iliac vein  Tremor  HTN (hypertension)  S/P shoulder surgery: R reverse total shoulder replacement, and L total shoulder replacement  History of back surgery    MEDICATIONS  (STANDING):  pantoprazole Infusion 8 mG/Hr (10 mL/Hr) IV Continuous <Continuous>    MEDICATIONS  (PRN):  ondansetron Injectable 4 milliGRAM(s) IV Push every 6 hours PRN Nausea    Allergies  No Known Allergies    SOCIAL HISTORY: lives at [place]    FAMILY HISTORY:  FHx: hypertension (Mother)      REVIEW OF SYSTEMS:    as above but otherwise not obtained due to pt mental status    Vital Signs Last 24 Hrs  T(C): 36.2 (28 May 2019 05:32), Max: 38 (27 May 2019 17:30)  T(F): 97.1 (28 May 2019 05:32), Max: 100.4 (27 May 2019 17:30)  HR: 128 (28 May 2019 06:00) (96 - 147)  BP: 90/38 (28 May 2019 06:00) (57/41 - 131/105)  BP(mean): 51 (28 May 2019 06:00) (45 - 100)  RR: 25 (28 May 2019 06:00) (20 - 35)  SpO2: 93% (28 May 2019 06:00) (77% - 100%)    PHYSICAL EXAM:    Constitutional: elderly male appears ill  HEENT: Dry MM  Neck: No LAD  Respiratory: CTA  Cardiovascular: S1 and S2, RRR, systolic murmur  Gastrointestinal: BS+, soft, mildly tender upper abdomen  Extremities: No peripheral edema  Vascular: 1+ peripheral pulses  Neurological: oriented to self and hospital  Skin: ecchymoses throughout    LABS:                        6.5    <0.10 )-----------( 37       ( 27 May 2019 21:58 )             20.0     05-27    140  |  114<H>  |  120<H>  ----------------------------<  129<H>  5.1   |  14<L>  |  2.36<H>    Ca    8.2<L>      27 May 2019 17:18    TPro  5.9<L>  /  Alb  2.0<L>  /  TBili  0.4  /  DBili  x   /  AST  31  /  ALT  14  /  AlkPhos  63  05-27    PT/INR - ( 27 May 2019 17:18 )   PT: 12.7 sec;   INR: 1.14 ratio         PTT - ( 27 May 2019 17:18 )  PTT:30.8 sec  LIVER FUNCTIONS - ( 27 May 2019 17:18 )  Alb: 2.0 g/dL / Pro: 5.9 gm/dL / ALK PHOS: 63 U/L / ALT: 14 U/L / AST: 31 U/L / GGT: x             RADIOLOGY & ADDITIONAL STUDIES:
Patient is a 89y old  Male who presents with a chief complaint of Hematemesis (28 May 2019 09:00)    HPI:  89 y/o M PMHx significant for myeloproliferative disorder/ P. vera on Hydroxyurea, CAD s/p stent, Hypertension, Hyperlipidemia, chronic DVT R iliac vein (not on A/C), hx GI bleeding, s/p IVC filter, OA, moderate aortic stenosis, suspected TIA (2019), and RBBB was BIBA from [place] for further evaluation and management of hematemesis. Vital signs in triage => BP 65/49, /min, SpO2 77%. Labs => WBC 0.012 -> <0.10, HgbHct -> 5.5/17.6 -> 6.5/20, PLT 2 -> 37, LA 3.8 -> 3.6 -> 2.5, HCO3 14, BUN/Cr 120/2.36, TPro 5.9, Alb 2.0, TnI 2.240. In the ED CCM and GI were consulted. The patient was transfused 3 units pRBCs (4 units ordered), and PLTs x 2. Of note, there were no notable episodes of gross hematemesis in the ED. The patient was started on a PPI gtt per protocol. blood cx came back growing GNRs he was given IV cefepime.       PMH: as above  PSH: as above  Meds: per reconciliation sheet, noted below  MEDICATIONS  (STANDING):  cefepime   IVPB 2000 milliGRAM(s) IV Intermittent every 24 hours  [id number]-sndz Injectable 300 MICROGram(s) SubCutaneous daily  pantoprazole Infusion 8 mG/Hr (10 mL/Hr) IV Continuous <Continuous>    Allergies    No Known Allergies    Intolerances      Social: no smoking, no alcohol, no illegal drugs; no recent travel, no exposure to TB  FAMILY HISTORY:  FHx: hypertension (Mother)     no history of premature cardiovascular disease in first degree relatives    ROS: the patient denies fever, no chills, no HA, no dizziness, no sore throat, no blurry vision, no CP, no palpitations, no abdominal pain, no diarrhea, no N/V, no dysuria, no leg pain, no claudication, no night sweats  All other systems reviewed and are negative    Vital Signs Last 24 Hrs  T(C): 36.1 (28 May 2019 09:06), Max: 38 (27 May 2019 17:30)  T(F): 97 (28 May 2019 09:06), Max: 100.4 (27 May 2019 17:30)  HR: 92 (28 May 2019 10:00) (92 - 147)  BP: 102/63 (28 May 2019 10:00) (57/41 - 131/105)  BP(mean): 70 (28 May 2019 10:00) (45 - 100)  RR: 24 (28 May 2019 10:00) (17 - 35)  SpO2: 94% (28 May 2019 10:00) (77% - 100%)  Daily Height in cm: 165.1 (27 May 2019 17:19)    Daily     PE:  Constitutional: frail looking  HEENT: NC/AT, EOMI, PERRLA, conjunctivae clear; ears and nose atraumatic; pharynx benign  Neck: supple; thyroid not palpable  Back: no tenderness  Respiratory: respiratory effort normal; clear to auscultation  Cardiovascular: S1S2 regular, no murmurs  Abdomen: soft, not tender, not distended, positive BS; liver and spleen WNL  Genitourinary: no suprapubic tenderness  Lymphatic: no LN palpable  Musculoskeletal: no muscle tenderness, no joint swelling or tenderness  Extremities: no pedal edema  Neurological/ Psychiatric:  moving all extremities  Skin: no rashes; no palpable lesions    Labs: all available labs reviewed                        8.7    <0.10 )-----------( 20       ( 28 May 2019 10:18 )             25.3     05-28    146<H>  |  118<H>  |  107<H>  ----------------------------<  99  3.8   |  16<L>  |  2.04<H>    Ca    7.8<L>      28 May 2019 06:49  Mg     2.0     -    TPro  5.2<L>  /  Alb  2.0<L>  /  TBili  1.0  /  DBili  x   /  AST  26  /  ALT  15  /  AlkPhos  59  -     LIVER FUNCTIONS - ( 28 May 2019 06:49 )  Alb: 2.0 g/dL / Pro: 5.2 gm/dL / ALK PHOS: 59 U/L / ALT: 15 U/L / AST: 26 U/L / GGT: x           Urinalysis Basic - ( 28 May 2019 02:40 )    Color: Yellow / Appearance: Clear / S.015 / pH: x  Gluc: x / Ketone: Negative  / Bili: Negative / Urobili: Negative mg/dL   Blood: x / Protein: 30 mg/dL / Nitrite: Negative   Leuk Esterase: Negative / RBC: 25-50 /HPF / WBC 3-5   Sq Epi: x / Non Sq Epi: Occasional / Bacteria: Few        Culture Results:   Growth in aerobic and anaerobic bottles: Gram Negative Rods  "Due to technical problems, Proteus sp. will Not be reported as part of  the BCID panel until further notice"  ***Blood Panel PCR results on this specimen are available  approximately 3 hours after the Gram stain result.***  Gram stain, PCR, and/or culture results may not always  correspond due to difference in methodologies.  ************************************************************  This PCR assay was performed using [employer].  The following targets are tested for: Enterococcus,  vancomycin resistant enterococci, Listeria monocytogenes,  coagulase negative staphylococci, S. aureus,  methicillin resistant S. aureus, Streptococcus agalactiae  (Group B), S. pneumoniae, S. pyogenes (Group A),  Acinetobacter baumannii, Enterobacter cloacae, E. coli,  Klebsiella oxytoca, K. pneumoniae, Proteus sp.,  Serratia marcescens, Haemophilus influenzae,  Neisseria meningitidis, Pseudomonas aeruginosa, Candida  albicans, C. glabrata, C krusei, C parapsilosis,  C. tropicalis and the KPC resistance gene. ( @ 18:18)    Radiology: all available radiological tests reviewed    EXAM:  XR CHEST PORTABLE IMMED 1V                            PROCEDURE DATE:  2019          INTERPRETATION:  CLINICAL STATEMENT: Chest Pain.    TECHNIQUE: AP view of the chest.    COMPARISON: 3/13/2019    FINDINGS/  IMPRESSION:  Bilateral shoulder replacements again noted.    Tiny nodular density overlies right lung base. No new consolidation or   pleural effusion.    Heart size cannot be accurately assessed in this projection.      Advanced directives addressed: full resuscitation

## 2019-05-28 NOTE — PROVIDER CONTACT NOTE (OTHER) - SITUATION
Upon entering patient`s room he found unresponsive. No respirations, no pulse, no heart or breath sounds auscultated. Pupils are nonreactive and fixed. No code called as per active DNR/DNI orders.

## 2019-05-28 NOTE — CONSULT NOTE ADULT - ASSESSMENT
89 y/o male with myeloproliferative disorder/ P vera for over 30 years, on Hydrea; hx of recurrent GI bleeding in the past ( ? source of bleeding- could not complete GI w/up in the past due to his age/comorbidities).   Last CBC in our office mid May 2019- was OK ( HGb ~ 11 plts ~ 660 ).Now admitted with hematemesis and pancytopenia.  Pancytopenia due to consumption due to blood loss/ poor marrow reserve in setting of underlying MPD and marrow suppression due to Hydrea.  Transformation into acute leukemia as cause for pancytopenia- possible but clinically less likely.  Hydrea on hold. ASA on hold.  Transfuse PRBC/ platelets as clinically necessary .  Will add filgrastim ( Zarxio)  x 5 doses to help with WBC recovery.  Thank You. Will continue to follow.
89 y/o M PMHx significant for myeloproliferative disorder/ P. vera on Hydroxyurea, CAD s/p stent, Hypertension, Hyperlipidemia, chronic DVT R iliac vein (not on A/C), hx GI bleeding, s/p IVC filter, OA, moderate aortic stenosis, suspected TIA (2/2019), and RBBB was BIBA from [place] for further evaluation and management of hematemesis. Vital signs in triage => BP 65/49, /min, SpO2 77%. Labs => WBC 0.012 -> <0.10, HgbHct -> 5.5/17.6 -> 6.5/20, PLT 2 -> 37, LA 3.8 -> 3.6 -> 2.5, HCO3 14, BUN/Cr 120/2.36, TPro 5.9, Alb 2.0, TnI 2.240. In the ED CCM and GI were consulted. The patient was transfused 3 units pRBCs (4 units ordered), and PLTs x 2. Of note, there were no notable episodes of gross hematemesis in the ED. The patient was started on a PPI gtt per protocol. blood cx came back growing GNRs he was given IV cefepime.     1. Ecoli sepsis/bacteremia/pancytopenia/hematemesis/myeloproliferative disorder/JOSE  - source of ecoli ? no reported diarrhea-abd pain,  (-), skin-soft tissue (-) ? bacterial translocation from gut  - f/u sensitivities of blood cx, repeat cx in am  - decrease cefepime dosing to 2gm daily in light of elevated Cr  - oncology eval appreciated, plan for neupogen  - GI eval noted  - monitor temps  - tolerating abx well so far; no side effects noted  - reason for abx use and side effects reviewed with patient  - supportive care  - f/u cbc    2. other issues - care per medicine
89yo male with multiple medical issues with myeloproliferative disorder on hydroxyurea now with pancytopenia and GI bleed  GI bleeding secondary to low plts in setting of pancytopenia  await AM cbc to see if needs further plts and PRBC  continue PPI drip  consider eventual endoscopic evaluation given clinical course but needs further resuscitation and blood component products prior  BP improving slowly with fluids and s/p 3 units PRBC  elevated lactate and troponin likely secondary issues to GI bleeding  if abdominal persists, would check ct scan when more stable

## 2019-05-29 LAB
CULTURE RESULTS: SIGNIFICANT CHANGE UP
SPECIMEN SOURCE: SIGNIFICANT CHANGE UP

## 2019-05-30 LAB
-  AMIKACIN: SIGNIFICANT CHANGE UP
-  AMPICILLIN/SULBACTAM: SIGNIFICANT CHANGE UP
-  AMPICILLIN: SIGNIFICANT CHANGE UP
-  AZTREONAM: SIGNIFICANT CHANGE UP
-  CEFAZOLIN: SIGNIFICANT CHANGE UP
-  CEFEPIME: SIGNIFICANT CHANGE UP
-  CEFOXITIN: SIGNIFICANT CHANGE UP
-  CEFTRIAXONE: SIGNIFICANT CHANGE UP
-  CIPROFLOXACIN: SIGNIFICANT CHANGE UP
-  ERTAPENEM: SIGNIFICANT CHANGE UP
-  GENTAMICIN: SIGNIFICANT CHANGE UP
-  IMIPENEM: SIGNIFICANT CHANGE UP
-  LEVOFLOXACIN: SIGNIFICANT CHANGE UP
-  MEROPENEM: SIGNIFICANT CHANGE UP
-  PIPERACILLIN/TAZOBACTAM: SIGNIFICANT CHANGE UP
-  TOBRAMYCIN: SIGNIFICANT CHANGE UP
-  TRIMETHOPRIM/SULFAMETHOXAZOLE: SIGNIFICANT CHANGE UP
CULTURE RESULTS: SIGNIFICANT CHANGE UP
METHOD TYPE: SIGNIFICANT CHANGE UP
ORGANISM # SPEC MICROSCOPIC CNT: SIGNIFICANT CHANGE UP
SPECIMEN SOURCE: SIGNIFICANT CHANGE UP

## 2019-06-12 DIAGNOSIS — K92.2 GASTROINTESTINAL HEMORRHAGE, UNSPECIFIED: ICD-10-CM

## 2019-06-12 DIAGNOSIS — D61.818 OTHER PANCYTOPENIA: ICD-10-CM

## 2019-06-12 DIAGNOSIS — N17.0 ACUTE KIDNEY FAILURE WITH TUBULAR NECROSIS: ICD-10-CM

## 2019-06-12 DIAGNOSIS — A41.51 SEPSIS DUE TO ESCHERICHIA COLI [E. COLI]: ICD-10-CM

## 2019-06-12 DIAGNOSIS — I25.10 ATHEROSCLEROTIC HEART DISEASE OF NATIVE CORONARY ARTERY WITHOUT ANGINA PECTORIS: ICD-10-CM

## 2019-06-12 DIAGNOSIS — I10 ESSENTIAL (PRIMARY) HYPERTENSION: ICD-10-CM

## 2019-06-12 DIAGNOSIS — I82.521 CHRONIC EMBOLISM AND THROMBOSIS OF RIGHT ILIAC VEIN: ICD-10-CM

## 2019-06-12 DIAGNOSIS — E78.5 HYPERLIPIDEMIA, UNSPECIFIED: ICD-10-CM

## 2019-06-12 DIAGNOSIS — K92.0 HEMATEMESIS: ICD-10-CM

## 2019-06-12 DIAGNOSIS — Z95.5 PRESENCE OF CORONARY ANGIOPLASTY IMPLANT AND GRAFT: ICD-10-CM

## 2019-06-12 DIAGNOSIS — J96.01 ACUTE RESPIRATORY FAILURE WITH HYPOXIA: ICD-10-CM

## 2019-06-12 DIAGNOSIS — R65.20 SEVERE SEPSIS WITHOUT SEPTIC SHOCK: ICD-10-CM

## 2019-06-12 DIAGNOSIS — D46.9 MYELODYSPLASTIC SYNDROME, UNSPECIFIED: ICD-10-CM

## 2019-06-12 DIAGNOSIS — Z66 DO NOT RESUSCITATE: ICD-10-CM

## 2019-06-18 ENCOUNTER — APPOINTMENT (OUTPATIENT)
Dept: HEMATOLOGY ONCOLOGY | Facility: CLINIC | Age: 84
End: 2019-06-18

## 2019-08-30 NOTE — ED ADULT NURSE NOTE - NSSISCREENINGQ5_ED_A_ED
Patient aware and was in lab at 11:00 am today for blood draw are in process.      Diana Klein RN  [facility]       No

## 2019-11-10 NOTE — DISCHARGE NOTE ADULT - WITHDRAWAL SYMPTOMS INCLUDE; NEGATIVE MOOD, URGES TO SMOKE, AND DIFFICULTY CONCENTRATING.
Pt states having a rash on torso arm and legs since yesterday. Pt states is itchy. Pt states has been on antibiotics for 5 days for pink eye. Pt states taking antibiotics in the past but never amoxicillin. Statement Selected

## 2020-11-11 NOTE — ED PROVIDER NOTE - NS ED SCRIBE STATEMENT
Attending Sski Pregnancy And Lactation Text: This medication is Pregnancy Category D and isn't considered safe during pregnancy. It is excreted in breast milk.

## 2020-11-24 NOTE — H&P ADULT - NSICDXPASTMEDICALHX_GEN_ALL_CORE_FT
Follow up with the pulmonogist (Dr Her) in 1-2 weeks.   PAST MEDICAL HISTORY:  Chronic deep vein thrombosis (DVT) of right iliac vein     HTN (hypertension)     Myeloproliferative disease     Polycythemia     Tremor PAST MEDICAL HISTORY:  Chronic deep vein thrombosis (DVT) of right iliac vein     HLD (hyperlipidemia)     HTN (hypertension)     Myeloproliferative disease     Polycythemia vera     Tremor

## 2021-04-16 NOTE — ED ADULT NURSE NOTE - PERSON CONFIRMING BED ASSIGNMENT
Michelle with [facility] called in to request an order for:     Left Breast Diagnostic Imaging   Left Breast Ultrasound  Ab Shanna, MAHNAZ

## 2021-04-27 NOTE — ED PROVIDER NOTE - RELIEVING FACTORS
[FreeTextEntry1] : - Add HCTZ for improved BP control\par - Continue with all other treatments \par  - Labs done in office\par - Further management pending lab results 
none

## 2021-12-28 NOTE — ED ADULT NURSE NOTE - PERIPHERAL VASCULAR WDL
[FreeTextEntry1] : here to discuss results from d and c : all benign\par requests pap and breast exam...will do so. Pulses equal bilaterally, no edema present.

## 2022-04-26 NOTE — PATIENT PROFILE ADULT - BRADEN SCORE
1401 Pt was notified of positive urine culture and informed that an antibiotic has been sent to the pharmacy.  
17

## 2022-10-03 NOTE — DISCHARGE NOTE ADULT - NS MD DC PLAN IMMU FLU REF OTH
Contraindicated Sarecycline Counseling: Patient advised regarding possible photosensitivity and discoloration of the teeth, skin, lips, tongue and gums.  Patient instructed to avoid sunlight, if possible.  When exposed to sunlight, patients should wear protective clothing, sunglasses, and sunscreen.  The patient was instructed to call the office immediately if the following severe adverse effects occur:  hearing changes, easy bruising/bleeding, severe headache, or vision changes.  The patient verbalized understanding of the proper use and possible adverse effects of sarecycline.  All of the patient's questions and concerns were addressed.

## 2023-08-12 NOTE — ED PROVIDER NOTE - NEUROLOGICAL MOTOR
Patient presenting with partial seizure in the setting of brain metastasis, he was previously on Keppra.   Had chemotherapy on Wednesday with Bertis Smart as well as IV hydration    Case discussed with neurology, they will initiate Keppra as AED-> continue on 1000 mg bid for maintenance dose  Corticosteroids -> continue decadron 4mg q6h with PPI for 1 week   radiation oncology following  Will plan to monitor for any recurrent seizure activity  Neuroimaging per the neurologist  CT of the brain with vasogenic edema    F/u with neuro, radiation onc, and onc outpt normal

## 2024-03-12 NOTE — ED PROVIDER NOTE - NS_EDPROVIDERDISPOUSERTYPE_ED_A_ED
Patient came to clinic for anticoagulation monitoring.  Diagnosis - PE/DVT  Goal -2.0-3.0    Last INR on 1/30 was 2.3.  Dose maintained.   Today's INR is 2.6 and is within goal range.    Current warfarin total weekly dose of 35 mg verified.  Informed the INR result is within therapeutic range and instructed to maintain current dose per protocol. Discussed dose and return date of 4/23 for next INR. See Anticoagulation flowsheet.    Dr Hanley is in the office today supervising the treatment.    Instructed to contact the clinic with any unusual bleeding or bruising, any changes in medications, diet, health status, lifestyle, or any other changes, questions or concerns. Verbalized understanding of all discussed.     Referring Provider: Dr. Lisa Gordillo  Order Expires: 11/09/2024     Scribe Attestation (For Scribes USE Only)... Attending Attestation (For Attendings USE Only).../Scribe Attestation (For Scribes USE Only)...

## 2024-05-15 NOTE — PHYSICAL THERAPY INITIAL EVALUATION ADULT - LIVES WITH, PROFILE
Report given to Hannah ICU RN, v/u and denies any questions at this time.    alone/Pt reports that he is normally very independent and lives alone [place].

## 2024-05-23 NOTE — INPATIENT CERTIFICATION FOR MEDICARE PATIENTS - IN ORDER TO MEET MEDICARE REQUIREMENTS.
In order to meet Medicare requirements, the clinical documentation must support the information cited in the admission order.  Please be sure to provide detailed and clear documentation about the following in the admitting note/history and physical: never

## 2024-08-26 NOTE — ED CLERICAL - DIVISION
ID brief note.     Patient with cavitary lung lesion. Patient was not seen as he was on his way for bronchoscopy. Cultures to be taken. Continue with IV Unasyn. AFB cultures have been taken.     Ferny Reeder PA-C   Infectious Disease  Pager: [phone]      [place]...
